# Patient Record
Sex: MALE | Race: BLACK OR AFRICAN AMERICAN | Employment: UNEMPLOYED | URBAN - METROPOLITAN AREA
[De-identification: names, ages, dates, MRNs, and addresses within clinical notes are randomized per-mention and may not be internally consistent; named-entity substitution may affect disease eponyms.]

---

## 2018-02-02 ENCOUNTER — HOSPITAL ENCOUNTER (OUTPATIENT)
Dept: ONCOLOGY | Age: 78
Discharge: HOME | End: 2018-02-02

## 2018-02-02 ENCOUNTER — OFFICE VISIT (OUTPATIENT)
Dept: ONCOLOGY | Age: 78
End: 2018-02-02

## 2018-02-02 ENCOUNTER — HOSPITAL ENCOUNTER (OUTPATIENT)
Dept: LAB | Age: 78
Discharge: HOME | End: 2018-02-02
Payer: MEDICARE

## 2018-02-02 VITALS
TEMPERATURE: 97.6 F | HEIGHT: 69 IN | WEIGHT: 205 LBS | SYSTOLIC BLOOD PRESSURE: 114 MMHG | HEART RATE: 54 BPM | BODY MASS INDEX: 30.36 KG/M2 | DIASTOLIC BLOOD PRESSURE: 51 MMHG

## 2018-02-02 DIAGNOSIS — D63.1 ANEMIA, CHRONIC RENAL FAILURE, STAGE 3 (MODERATE) (HCC): Primary | ICD-10-CM

## 2018-02-02 DIAGNOSIS — D50.8 IRON DEFICIENCY ANEMIA SECONDARY TO INADEQUATE DIETARY IRON INTAKE: ICD-10-CM

## 2018-02-02 DIAGNOSIS — N18.30 ANEMIA, CHRONIC RENAL FAILURE, STAGE 3 (MODERATE) (HCC): ICD-10-CM

## 2018-02-02 DIAGNOSIS — D63.1 ANEMIA, CHRONIC RENAL FAILURE, STAGE 3 (MODERATE) (HCC): ICD-10-CM

## 2018-02-02 DIAGNOSIS — N18.30 ANEMIA, CHRONIC RENAL FAILURE, STAGE 3 (MODERATE) (HCC): Primary | ICD-10-CM

## 2018-02-02 LAB
ALBUMIN SERPL-MCNC: 3.9 G/DL (ref 3.4–5)
ALBUMIN/GLOB SERPL: 1.3 {RATIO} (ref 0.8–1.7)
ALP SERPL-CCNC: 69 U/L (ref 45–117)
ALT SERPL-CCNC: 26 U/L (ref 16–61)
ANION GAP SERPL CALC-SCNC: 7 MMOL/L (ref 3–18)
AST SERPL-CCNC: 23 U/L (ref 15–37)
BASO+EOS+MONOS # BLD AUTO: 0.4 K/UL (ref 0–2.3)
BASO+EOS+MONOS # BLD AUTO: 12 % (ref 0.1–17)
BILIRUB SERPL-MCNC: 0.4 MG/DL (ref 0.2–1)
BUN SERPL-MCNC: 32 MG/DL (ref 7–18)
BUN/CREAT SERPL: 18 (ref 12–20)
CALCIUM SERPL-MCNC: 9.4 MG/DL (ref 8.5–10.1)
CHLORIDE SERPL-SCNC: 109 MMOL/L (ref 100–108)
CO2 SERPL-SCNC: 27 MMOL/L (ref 21–32)
CREAT SERPL-MCNC: 1.75 MG/DL (ref 0.6–1.3)
DIFFERENTIAL METHOD BLD: ABNORMAL
ERYTHROCYTE [DISTWIDTH] IN BLOOD BY AUTOMATED COUNT: 16.8 % (ref 11.5–14.5)
GLOBULIN SER CALC-MCNC: 3 G/DL (ref 2–4)
GLUCOSE SERPL-MCNC: 123 MG/DL (ref 74–99)
HCT VFR BLD AUTO: 31 % (ref 36–48)
HGB BLD-MCNC: 10.1 G/DL (ref 12–16)
LYMPHOCYTES # BLD: 1.4 K/UL (ref 1.1–5.9)
LYMPHOCYTES NFR BLD: 43 % (ref 14–44)
MCH RBC QN AUTO: 28.8 PG (ref 25–35)
MCHC RBC AUTO-ENTMCNC: 32.6 G/DL (ref 31–37)
MCV RBC AUTO: 88.3 FL (ref 78–102)
NEUTS SEG # BLD: 1.5 K/UL (ref 1.8–9.5)
NEUTS SEG NFR BLD: 45 % (ref 40–70)
PLATELET # BLD AUTO: 148 K/UL (ref 140–440)
POTASSIUM SERPL-SCNC: 4 MMOL/L (ref 3.5–5.5)
PROT SERPL-MCNC: 6.9 G/DL (ref 6.4–8.2)
RBC # BLD AUTO: 3.51 M/UL (ref 4.1–5.1)
SODIUM SERPL-SCNC: 143 MMOL/L (ref 136–145)
WBC # BLD AUTO: 3.3 K/UL (ref 4.5–13)

## 2018-02-02 PROCEDURE — 82728 ASSAY OF FERRITIN: CPT | Performed by: INTERNAL MEDICINE

## 2018-02-02 PROCEDURE — 83540 ASSAY OF IRON: CPT | Performed by: INTERNAL MEDICINE

## 2018-02-02 PROCEDURE — 82668 ASSAY OF ERYTHROPOIETIN: CPT | Performed by: INTERNAL MEDICINE

## 2018-02-02 PROCEDURE — 36415 COLL VENOUS BLD VENIPUNCTURE: CPT | Performed by: INTERNAL MEDICINE

## 2018-02-02 PROCEDURE — 80053 COMPREHEN METABOLIC PANEL: CPT | Performed by: INTERNAL MEDICINE

## 2018-02-02 PROCEDURE — 84165 PROTEIN E-PHORESIS SERUM: CPT | Performed by: INTERNAL MEDICINE

## 2018-02-02 NOTE — MR AVS SNAPSHOT
[address] [address] [address] [place] [address] 
[phone] Patient: Pau Mccullough MRN: [id number] CQE:1/1/7832 Visit Information Date & Time Provider Department Dept. Phone Encounter #  
 2/2/2018  3:30 PM [facility]nel[facility] Office [phone] [id number] Follow-up Instructions Return in about 1 week (around 2/9/2018). Your Appointments 2/12/2018  1:00 PM  
ESTABLISHED PATIENT with Rusty Pineda MD  
[facility]) Appt Note: 1 year F/u Flow bus ua  
 [address] [place] [address] [facility] [address] [address]  
  
    
 3/12/2018  3:45 PM  
Office Visit with Jordan Powers MD  
[address] [facility]  [facility]) Appt Note: 1 wk fu; 1 wk fu  
 [address] [place] [address] [facility] [address] [facility], [address] [place] [address] Upcoming Health Maintenance Date Due DTaP/Tdap/Td series (1 - Tdap) 3/7/1961 ZOSTER VACCINE AGE 60> 1/7/2002 GLAUCOMA SCREENING Q2Y 3/7/2007 MEDICARE YEARLY EXAM 3/7/2007 Influenza Age 5 to Adult 8/1/2017 Pneumococcal 65+ Low/Medium Risk (2 of 2 - PPSV23) 10/4/2018 Allergies as of 2/2/2018  Review Complete On: 10/4/2017 By: Emma William LPN No Known Allergies Current Immunizations  Never Reviewed No immunizations on file. Not reviewed this visit You Were Diagnosed With   
  
 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  
 30.27 kg/m 2 2.13 m 2 Preferred Pharmacy Pharmacy Name Phone [address] [facility], [address] Your Updated Medication List  
  
   
This list is accurate as of: 2/2/18  3:30 PM.  Always use your most recent med list.  
  
  
  
  
 atorvastatin 40 mg tablet Commonly known as:  LIPITOR  
take 1 tablet by mouth at bedtime for cholesterol  
  
 carvedilol 12.5 mg tablet Commonly known as:  Opal Pates Take  by mouth two (2) times daily (with meals). dilTIAZem  mg ER capsule Commonly known as:  CARDIZEM CD  
  
 ELIQUIS 5 mg tablet Generic drug:  apixaban  
  
 fenofibrate micronized 67 mg capsule Commonly known as:  LOFIBRA  
take 1 capsule by mouth once daily  
  
 furosemide 20 mg tablet Commonly known as:  LASIX  
  
 levETIRAcetam 750 mg tablet Commonly known as:  KEPPRA  
take 1 tablet by mouth twice a day  
  
 losartan-hydroCHLOROthiazide 100-25 mg per tablet Commonly known as:  HYZAAR Take 1 Tab by mouth daily. metFORMIN 500 mg tablet Commonly known as:  GLUCOPHAGE Take  by mouth two (2) times daily (with meals). omeprazole 10 mg capsule Commonly known as:  PRILOSEC Take 10 mg by mouth daily. tamsulosin 0.4 mg capsule Commonly known as:  FLOMAX  
take 1 capsule by mouth every evening  
  
 timolol 0.5 % ophthalmic solution Commonly known as:  TIMOPTIC  
1 Drop two (2) times a day. Follow-up Instructions Return in about 1 week (around 2/9/2018). To-Do List   
 02/02/2018 Lab:  ERYTHROPOIETIN   
  
 02/02/2018 Lab:  FERRITIN   
  
 02/02/2018 Lab:  IRON PROFILE   
  
 02/02/2018 Lab:  METABOLIC PANEL, COMPREHENSIVE   
  
 02/02/2018 Lab: PROTEIN ELECTROPHORESIS Patient Instructions Anemia From Chronic Disease: Care Instructions Your Care Instructions Anemia is a low level of red blood cells. Red blood cells carry oxygen from your lungs to the rest of your body. Sometimes when you have a long-term (chronic) disease, such as kidney disease, arthritis, diabetes, cancer, or an infection, your body does not make enough red blood cells. Follow-up care is a key part of your treatment and safety. Be sure to make and go to all appointments, and call your doctor if you are having problems. It's also a good idea to know your test results and keep a list of the medicines you take. How can you care for yourself at home? · Follow your doctor's instructions to treat the chronic condition that is causing the anemia. · Be safe with medicines. Take your medicine to treat your chronic condition exactly as prescribed. Call your doctor if you think you are having a problem with your medicine. · Take your medicine for anemia exactly as prescribed. Call your doctor if you think you are having a problem with your medicine. Medicines to increase the number of red blood cells (such as epoetin or darbepoetin) may be given as an injection. ¨ If you miss a dose, take it as soon as you can, unless it is almost time for your next dose. In that case, get back on your regular schedule and take only one dose. ¨ Do not freeze this medicine. Store it in the refrigerator. Do not shake the bottle before you prepare the shot. · Keep all your appointments for blood tests to check on your hemoglobin levels. When should you call for help? Call 911 anytime you think you may need emergency care. For example, call if: 
? · You passed out (lost consciousness). ?Call your doctor now or seek immediate medical care if: 
? · You are short of breath. ? · You are dizzy or light-headed, or you feel like you may faint. ? · You have new or worse bleeding. ?Watch closely for changes in your health, and be sure to contact your doctor if: 
? · You feel weaker or more tired than usual.  
? · You do not get better as expected. Where can you learn more? Go to http://nasrin-deejay.info/. Enter E502 in the search box to learn more about \"Anemia From Chronic Disease: Care Instructions. \" Current as of: October 13, 2016 Content Version: 11.4 © 7416-0913 [employer]. Care instructions adapted under license by [employer] (which disclaims liability or warranty for this information). If you have questions about a medical condition or this instruction, always ask your healthcare professional. [address] any warranty or liability for your use of this information. Introducing [facility]! Minal May introduces [employer] patient portal. Now you can access parts of your medical record, email your doctor's office, and request medication refills online. 1. In your internet browser, go to [email or web address] 2. Click on the First Time User? Click Here link in the Sign In box. You will see the New Member Sign Up page. 3. Enter your [employer] Access Code exactly as it appears below. You will not need to use this code after youve completed the sign-up process. If you do not sign up before the expiration date, you must request a new code. · [employer] Access Code: [email or web address] Expires: 5/3/2018  3:30 PM 
 
4. Enter the last four digits of your Social Security Number (xxxx) and Date of Birth (mm/dd/yyyy) as indicated and click Submit. You will be taken to the next sign-up page. 5. Create a [employer] ID. This will be your [employer] login ID and cannot be changed, so think of one that is secure and easy to remember. 6. Create a [employer] password. You can change your password at any time. 7. Enter your Password Reset Question and Answer.  This can be used at a later time if you forget your password. 8. Enter your e-mail address. You will receive e-mail notification when new information is available in [address] 9. Click Sign Up. You can now view and download portions of your medical record. 10. Click the Download Summary menu link to download a portable copy of your medical information. If you have questions, please visit the Frequently Asked Questions section of the [employer] website. Remember, [employer] is NOT to be used for urgent needs. For medical emergencies, dial 911. Now available from your iPhone and Android! Please provide this summary of care documentation to your next provider. Your primary care clinician is listed as Maribel Carrillo. If you have any questions after today's visit, please call [phone].

## 2018-02-02 NOTE — PROGRESS NOTES
Hematology/Oncology Consultation Note    Name: Sigurd Romberg  Date: 2018  : 1940    PCP: Gregory Baez MD       Mr. Renee Green  is a 78 y.o. -American man who was referred for evaluation of slowly progressive anemia. Subjective:   Chief complaint: Low blood count    History of present illness:  Mr. Renee Green is a 51-year-old -American man who states that over the past 6 months he has noticed a slowly progressive decline in his energy level. He has also noticed that he is doing better when he moderate his activity level to avoid feeling tired. In 2017 he was told that he was anemic with a hemoglobin of about 11.5 g/dL. He has been taking over-the-counter iron supplementation since initially being told that he is anemic. However a follow-up CBC done on 2018 showed that his hemoglobin had declined further to 10.8 g/dL with hematocrit of 32.5. He denies having any gastrointestinal blood loss or genitourinary blood loss. He is therefore here today for complete assessment to have define an etiology for his slowly progressive anemia and to discuss additional options of management beyond oral iron therapy. The patient is also seeking clearance so he can proceed with having a right hip total replacement surgery. His clearance was held up by his primary care physician until he is seen in hematology regarding the anemia. He is using a cane for mobility support. Past Medical History:   Diagnosis Date    Hypercholesterolemia     Hypertension     Stroke (cerebrum) ([facility] [address]        No Known Allergies    History reviewed. No pertinent surgical history. Social History     Social History    Marital status:      Spouse name: N/A    Number of children: N/A    Years of education: N/A     Occupational History    Not on file.      Social History Main Topics    Smoking status: Never Smoker    Smokeless tobacco: Never Used    Alcohol use No    Drug use: Not on file    Sexual activity: Not on file     Other Topics Concern    Not on file     Social History Narrative       Family History   Problem Relation Age of Onset    Diabetes Mother     Hypertension Mother     Heart Disease Mother     Cancer Father     Heart Disease Brother     Diabetes Brother     Heart Disease Brother        Current Outpatient Prescriptions   Medication Sig Dispense Refill    tamsulosin (FLOMAX) 0.4 mg capsule take 1 capsule by mouth every evening 90 Cap 3    metFORMIN (GLUCOPHAGE) 500 mg tablet Take  by mouth two (2) times daily (with meals).  timolol (TIMOPTIC) 0.5 % ophthalmic solution 1 Drop two (2) times a day.  ELIQUIS 5 mg tablet   0    atorvastatin (LIPITOR) 40 mg tablet take 1 tablet by mouth at bedtime for cholesterol  0    dilTIAZem CD (CARDIZEM CD) 120 mg ER capsule   0    fenofibrate micronized (LOFIBRA) 67 mg capsule take 1 capsule by mouth once daily  0    furosemide (LASIX) 20 mg tablet   0    levETIRAcetam (KEPPRA) 750 mg tablet take 1 tablet by mouth twice a day  0    carvedilol (COREG) 12.5 mg tablet Take  by mouth two (2) times daily (with meals).  losartan-hydrochlorothiazide (HYZAAR) 100-25 mg per tablet Take 1 Tab by mouth daily.  omeprazole (PRILOSEC) 10 mg capsule Take 10 mg by mouth daily. Review of Systems    General ROS:The patient has complaints of some fatigue and weakness. Psychological ROS: patient denies having any psychological symptoms such as hallucinations, depression or anxiety. Ophthalmic ROS:the patient denies having any visual impairment or eye discomfort. ENT ROS: there are no abnormalities reported. Allergy and Immunology ROS:the patient denies having any seasonal allergies or allergies to medications other than those already outlined above. Hematological and Lymphatic ROS: the patient denies having any bruising, bleeding or lymphadenopathy. Endocrine ROS: the patient denies having any heat or cold intolerance.   There is no history of diabetes or thyroid disorders. Breast ROS: the patient denies having any history of breast mass, nipple discharge, or lumps. Respiratory ROS:the patient denies having any cough, shortness of breath, or dyspnea on exertion. Cardiovascular ROS: there are no complaints of chest pain, palpitations, chest pounding, or dyspnea on exertion. Gastrointestinal ROS: the patient denies having nausea, emesis, diarrhea, constipation, or blood in the stool. Genito-Urinary ROS: the patient denies having urinary urgency, frequency, or dysuria. Musculoskeletal ROS: The patient complains of right hip pain and is hoping to ultimately get clearance so he can have total hip replacement surgery in the upcoming weeks. Neurological ROS: the patient denies having any numbness, tingling, or neurologic deficits. Dermatological ROS:patient denies having any unexplained rash, skin ulcerations, or hives. Objective:     Visit Vitals    /51    Pulse (!) 54    Temp 97.6 °F (36.4 °C) (Oral)    Ht 5' 9\" (1.753 m)    Wt 93 kg (205 lb)    BMI 30.27 kg/m2        Physical Exam:   Gen. Appearance: the patient is in no acute distress. Skin: There is no evidence of bruise or rash. HEENT: The head is normocephalic and atraumatic. The conjunctiva and sclera are clear. Pupils are equal, round, reactive to light, and accommodation. The extraocular movements are intact. ENT reveals no oral mucosal lesions or ulcerations. Neck: Supple without lymphadenopathy or thyromegaly. Lungs: Clear to auscultation and percussion; there are no wheezes or rhonchi. Heart: Regular rate and rhythm; there are no murmurs, gallops, or rubs. Abdomen: Bowel sounds are present and normal.  There is no guarding, tenderness, or hepatosplenomegaly. Extremities: There is no clubbing, cyanosis, or edema. Neurologic: There are no focal neurologic deficits. Lymphatics: There is no palpable peripheral lymphadenopathy. Lab data:   The most recent CBC dated 1/25/2018 shows a WBC count of 3.5, hemoglobin is 10.4 g/dL, hematocrit is 32.2%, MCV is 87, and the platelet count is 984,486. A urinalysis from 1/25/2018 was negative. Assessment:   Chronic anemia, etiology not clearly defined: I have explained to the patient that his anemia is most likely multifactorial.  The diagnostic considerations include anemia with chronic kidney disease, myelodysplastic syndrome, iron deficiency, and plasma cell dyscrasia. Plan:   Chronic anemia, etiology not clearly define: At this time I will order a comprehensive metabolic panel, iron profile, ferritin level, erythropoietin level, and SPEP. I will plan to see the patient back in clinic in 2 weeks to review lab data and to discuss potential options of management. Pending the results of these tests we will be able to determine whether or not we can clear the patient for his elective surgical procedure as well. Follow-up in 2 weeks. Orders Placed This Encounter    COMPLETE CBC & AUTO DIFF WBC    METABOLIC PANEL, COMPREHENSIVE     Standing Status:   Future     Number of Occurrences:   1     Standing Expiration Date:   2/3/2019    IRON PROFILE     Standing Status:   Future     Number of Occurrences:   1     Standing Expiration Date:   2/3/2019    FERRITIN     Standing Status:   Future     Number of Occurrences:   1     Standing Expiration Date:   2/3/2019    SPEP     Standing Status:   Future     Number of Occurrences:   1     Standing Expiration Date:   2/3/2019    ERYTHROPOIETIN     Standing Status:   Future     Number of Occurrences:   1     Standing Expiration Date:   2/3/2019    InHouse CBC ([employer])     Standing Status:   Future     Number of Occurrences:   1     Standing Expiration Date:   2/9/2018           Donte Lowery MD  2/6/2018      Please note: This document has been produced using voice recognition software. Unrecognized errors in transcription may be present.

## 2018-02-02 NOTE — PATIENT INSTRUCTIONS
Anemia From Chronic Disease: Care Instructions  Your Care Instructions    Anemia is a low level of red blood cells. Red blood cells carry oxygen from your lungs to the rest of your body. Sometimes when you have a long-term (chronic) disease, such as kidney disease, arthritis, diabetes, cancer, or an infection, your body does not make enough red blood cells. Follow-up care is a key part of your treatment and safety. Be sure to make and go to all appointments, and call your doctor if you are having problems. It's also a good idea to know your test results and keep a list of the medicines you take. How can you care for yourself at home? · Follow your doctor's instructions to treat the chronic condition that is causing the anemia. · Be safe with medicines. Take your medicine to treat your chronic condition exactly as prescribed. Call your doctor if you think you are having a problem with your medicine. · Take your medicine for anemia exactly as prescribed. Call your doctor if you think you are having a problem with your medicine. Medicines to increase the number of red blood cells (such as epoetin or darbepoetin) may be given as an injection. ¨ If you miss a dose, take it as soon as you can, unless it is almost time for your next dose. In that case, get back on your regular schedule and take only one dose. ¨ Do not freeze this medicine. Store it in the refrigerator. Do not shake the bottle before you prepare the shot. · Keep all your appointments for blood tests to check on your hemoglobin levels. When should you call for help? Call 911 anytime you think you may need emergency care. For example, call if:  ? · You passed out (lost consciousness). ?Call your doctor now or seek immediate medical care if:  ? · You are short of breath. ? · You are dizzy or light-headed, or you feel like you may faint. ? · You have new or worse bleeding. ? Watch closely for changes in your health, and be sure to contact your doctor if:  ? · You feel weaker or more tired than usual.   ? · You do not get better as expected. Where can you learn more? Go to http://nasrin-deejay.info/. Enter E502 in the search box to learn more about \"Anemia From Chronic Disease: Care Instructions. \"  Current as of: October 13, 2016  Content Version: 11.4  © 7439-6900 [email or web address]. Care instructions adapted under license by [employer] (which disclaims liability or warranty for this information). If you have questions about a medical condition or this instruction, always ask your healthcare professional. [place] [address] any warranty or liability for your use of this information.

## 2018-02-03 LAB
EPO SERPL-ACNC: 16.8 MIU/ML (ref 2.6–18.5)
FERRITIN SERPL-MCNC: 160 NG/ML (ref 8–388)
IRON SATN MFR SERPL: 20 %
IRON SERPL-MCNC: 65 UG/DL (ref 50–175)
TIBC SERPL-MCNC: 322 UG/DL (ref 250–450)

## 2018-02-05 LAB
ALBUMIN SERPL ELPH-MCNC: 3.6 G/DL (ref 2.9–4.4)
ALBUMIN/GLOB SERPL: 1.2 {RATIO} (ref 0.7–1.7)
ALPHA1 GLOB SERPL ELPH-MCNC: 0.2 G/DL (ref 0–0.4)
ALPHA2 GLOB SERPL ELPH-MCNC: 0.7 G/DL (ref 0.4–1)
B-GLOBULIN SERPL ELPH-MCNC: 1 G/DL (ref 0.7–1.3)
GAMMA GLOB SERPL ELPH-MCNC: 1 G/DL (ref 0.4–1.8)
GLOBULIN SER CALC-MCNC: 2.9 G/DL (ref 2.2–3.9)
M PROTEIN SERPL ELPH-MCNC: NORMAL G/DL
PROT SERPL-MCNC: 6.5 G/DL (ref 6–8.5)

## 2018-02-14 ENCOUNTER — OFFICE VISIT (OUTPATIENT)
Dept: ONCOLOGY | Age: 78
End: 2018-02-14

## 2018-02-14 VITALS
SYSTOLIC BLOOD PRESSURE: 137 MMHG | DIASTOLIC BLOOD PRESSURE: 49 MMHG | HEART RATE: 61 BPM | BODY MASS INDEX: 31.31 KG/M2 | WEIGHT: 212 LBS | TEMPERATURE: 97 F

## 2018-02-14 DIAGNOSIS — D63.1 ANEMIA, CHRONIC RENAL FAILURE, STAGE 3 (MODERATE) (HCC): Primary | ICD-10-CM

## 2018-02-14 DIAGNOSIS — N18.30 ANEMIA, CHRONIC RENAL FAILURE, STAGE 3 (MODERATE) (HCC): Primary | ICD-10-CM

## 2018-02-14 DIAGNOSIS — D50.8 IRON DEFICIENCY ANEMIA SECONDARY TO INADEQUATE DIETARY IRON INTAKE: ICD-10-CM

## 2018-02-14 NOTE — PATIENT INSTRUCTIONS
Anemia From Chronic Disease: Care Instructions  Your Care Instructions    Anemia is a low level of red blood cells. Red blood cells carry oxygen from your lungs to the rest of your body. Sometimes when you have a long-term (chronic) disease, such as kidney disease, arthritis, diabetes, cancer, or an infection, your body does not make enough red blood cells. Follow-up care is a key part of your treatment and safety. Be sure to make and go to all appointments, and call your doctor if you are having problems. It's also a good idea to know your test results and keep a list of the medicines you take. How can you care for yourself at home? · Follow your doctor's instructions to treat the chronic condition that is causing the anemia. · Be safe with medicines. Take your medicine to treat your chronic condition exactly as prescribed. Call your doctor if you think you are having a problem with your medicine. · Take your medicine for anemia exactly as prescribed. Call your doctor if you think you are having a problem with your medicine. Medicines to increase the number of red blood cells (such as epoetin or darbepoetin) may be given as an injection. ¨ If you miss a dose, take it as soon as you can, unless it is almost time for your next dose. In that case, get back on your regular schedule and take only one dose. ¨ Do not freeze this medicine. Store it in the refrigerator. Do not shake the bottle before you prepare the shot. · Keep all your appointments for blood tests to check on your hemoglobin levels. When should you call for help? Call 911 anytime you think you may need emergency care. For example, call if:  ? · You passed out (lost consciousness). ?Call your doctor now or seek immediate medical care if:  ? · You are short of breath. ? · You are dizzy or light-headed, or you feel like you may faint. ? · You have new or worse bleeding. ? Watch closely for changes in your health, and be sure to contact your doctor if:  ? · You feel weaker or more tired than usual.   ? · You do not get better as expected. Where can you learn more? Go to http://nasrin-deejay.info/. Enter E502 in the search box to learn more about \"Anemia From Chronic Disease: Care Instructions. \"  Current as of: October 13, 2016  Content Version: 11.4  © 3502-5274 [employer]. Care instructions adapted under license by [employer] (which disclaims liability or warranty for this information). If you have questions about a medical condition or this instruction, always ask your healthcare professional. [place] [address] any warranty or liability for your use of this information.

## 2018-02-14 NOTE — MR AVS SNAPSHOT
[facility] 
 
 
 [facility] 207, [place] [address] 200 [facility] 
[phone] Patient: Verónica Cline MRN: [id number] TWK:[id number] Visit Information Date & Time Provider Department Dept. Phone Encounter #  
 2/14/2018  9:15 AM Bakair Glass MD [facility] Medical [facility] [phone] [id number] Follow-up Instructions Return in about 2 months (around 4/14/2018). Your Appointments 3/12/2018  9:00 AM  
Nurse Visit with SANDRINE SILVER [facility] Medical On[facility] ([facility]) Appt Note: CBC  
 [address] [place], [place] [address] [place] [address] [facility] 3200 [facility], [address] [place] [address] 200 [facility] 4/18/2018  9:15 AM  
Office Visit with Bakari Glass MD  
[address] [address] Oncology [facility]) Appt Note: 2 months [facility] 207, [place] [address]61 [place] [address] 250 [facility] 207, [address]7 [facility] [address] [address] [address]  
  
    
 1/29/2019  9:30 AM  
Nurse Visit with Juan C Nix [facility] ([facility]) Appt Note: PSA  
 [address] [place] [address] [facility] 300 [facility]  
  
    
  
 2/12/2019 10:00 AM  
Any with Ingrid Askew MD  
[facility] ([facility]) Appt Note: 1 FLOW PVR UA PSA PRIORS  
 [address] [place] [address]  
  
   
 709 [facility] [address] [address] [address] Upcoming Health Maintenance Date Due DTaP/Tdap/Td series (1 - Tdap) 3/7/1963 ZOSTER VACCINE AGE 60> 1/7/2002 GLAUCOMA SCREENING Q2Y 3/7/2007 Pneumococcal 65+ High/Highest Risk (1 of 2 - PCV13) 3/7/2007 MEDICARE YEARLY EXAM 3/7/2007 Influenza Age 5 to Adult 8/1/2017 Allergies as of 2/14/2018  Review Complete On: 2/14/2018 By: Dandy Garcia MD  
 No Known Allergies Current Immunizations  Never Reviewed No immunizations on file. Not reviewed this visit You Were Diagnosed With   
  
 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  
 31.31 kg/m 2 2.16 m 2 Preferred Pharmacy Pharmacy Name Phone [id number] [facility], [address] [address] Your Updated Medication List  
  
   
This list is accurate as of: 2/14/18  9:55 AM.  Always use your most recent med list.  
  
  
  
  
 atorvastatin 40 mg tablet Commonly known as:  LIPITOR  
take 1 tablet by mouth at bedtime for cholesterol  
  
 bumetanide 2 mg tablet Commonly known as:  Tyson Puckett CARTIA  mg ER capsule Generic drug:  dilTIAZem CD  
  
 carvedilol 12.5 mg tablet Commonly known as:  Rk Kirk Take  by mouth two (2) times daily (with meals). ELIQUIS 5 mg tablet Generic drug:  apixaban  
  
 fenofibrate micronized 67 mg capsule Commonly known as:  LOFIBRA  
take 1 capsule by mouth once daily  
  
 furosemide 20 mg tablet Commonly known as:  LASIX  
  
 levETIRAcetam 750 mg tablet Commonly known as:  KEPPRA  
take 1 tablet by mouth twice a day  
  
 losartan-hydroCHLOROthiazide 100-25 mg per tablet Commonly known as:  HYZAAR Take 1 Tab by mouth daily. metFORMIN 500 mg tablet Commonly known as:  GLUCOPHAGE Take  by mouth two (2) times daily (with meals). omeprazole 10 mg capsule Commonly known as:  PRILOSEC  
 Take 10 mg by mouth daily. tamsulosin 0.4 mg capsule Commonly known as:  FLOMAX  
take 1 capsule by mouth every evening  
  
 timolol 0.5 % ophthalmic solution Commonly known as:  TIMOPTIC  
1 Drop two (2) times a day. Follow-up Instructions Return in about 2 months (around 4/14/2018). Patient Instructions Anemia From Chronic Disease: Care Instructions Your Care Instructions Anemia is a low level of red blood cells. Red blood cells carry oxygen from your lungs to the rest of your body. Sometimes when you have a long-term (chronic) disease, such as kidney disease, arthritis, diabetes, cancer, or an infection, your body does not make enough red blood cells. Follow-up care is a key part of your treatment and safety. Be sure to make and go to all appointments, and call your doctor if you are having problems. It's also a good idea to know your test results and keep a list of the medicines you take. How can you care for yourself at home? · Follow your doctor's instructions to treat the chronic condition that is causing the anemia. · Be safe with medicines. Take your medicine to treat your chronic condition exactly as prescribed. Call your doctor if you think you are having a problem with your medicine. · Take your medicine for anemia exactly as prescribed. Call your doctor if you think you are having a problem with your medicine. Medicines to increase the number of red blood cells (such as epoetin or darbepoetin) may be given as an injection. ¨ If you miss a dose, take it as soon as you can, unless it is almost time for your next dose. In that case, get back on your regular schedule and take only one dose. ¨ Do not freeze this medicine. Store it in the refrigerator. Do not shake the bottle before you prepare the shot. · Keep all your appointments for blood tests to check on your hemoglobin levels. When should you call for help? Call 911 anytime you think you may need emergency care. For example, call if: 
? · You passed out (lost consciousness). ?Call your doctor now or seek immediate medical care if: 
? · You are short of breath. ? · You are dizzy or light-headed, or you feel like you may faint. ? · You have new or worse bleeding. ? Watch closely for changes in your health, and be sure to contact your doctor if: 
? · You feel weaker or more tired than usual.  
? · You do not get better as expected. Where can you learn more? Go to http://nasrin-deejay.info/. Enter E502 in the search box to learn more about \"Anemia From Chronic Disease: Care Instructions. \" Current as of: October 13, 2016 Content Version: 11.4 © 6727-3462 [employer]. Care instructions adapted under license by [employer] (which disclaims liability or warranty for this information). If you have questions about a medical condition or this instruction, always ask your healthcare professional. [address] any warranty or liability for your use of this information. Introducing [address]! Jan Chapman introduces We[employer] patient portal. Now you can access parts of your medical record, email your doctor's office, and request medication refills online. 1. In your internet browser, go to [email or web address] 2. Click on the First Time User? Click Here link in the Sign In box. You will see the New Member Sign Up page. 3. Enter your WeWork Access Code exactly as it appears below. You will not need to use this code after youve completed the sign-up process. If you do not sign up before the expiration date, you must request a new code. · [employer] Access Code: [email or web address] Expires: 5/3/2018  3:30 PM 
 
4. Enter the last four digits of your Social Security Number (xxxx) and Date of Birth (mm/dd/yyyy) as indicated and click Submit.  You will be taken to the next sign-up page. 5. Create a DNA Direct ID. This will be your DNA Direct login ID and cannot be changed, so think of one that is secure and easy to remember. 6. Create a DNA Direct password. You can change your password at any time. 7. Enter your Password Reset Question and Answer. This can be used at a later time if you forget your password. 8. Enter your e-mail address. You will receive e-mail notification when new information is available in [address] 9. Click Sign Up. You can now view and download portions of your medical record. 10. Click the Download Summary menu link to download a portable copy of your medical information. If you have questions, please visit the Frequently Asked Questions section of the DNA Direct website. Remember, DNA Direct is NOT to be used for urgent needs. For medical emergencies, dial 911. Now available from your iPhone and Android! Please provide this summary of care documentation to your next provider. Your primary care clinician is listed as Red Webster. If you have any questions after today's visit, please call [phone].

## 2018-02-16 NOTE — PROGRESS NOTES
Hematology/medical oncology progress note    2/14/2018  Verónica Cline  YOB: 1940    PCP: Richa Mejia MD    Diagnosis anemia associated with chronic kidney disease    Mr. Rere Guerrero is a 79-year-old man who is referred for evaluation of anemia. Have explained to the patient that his CBC on 2/2/2018 revealed a WBC count of 3.3, hemoglobin 10.1 g/dL, hematocrit 31%, and his platelet count was 197,070. His comprehensive metabolic panel revealed that he had a creatinine of 1.75 mg/dL with a BUN of 32. Iron studies revealed that his iron level was 65 mcg/dL with an iron saturation of 20%. His erythropoietin level was normal at 16.8 and his ferritin level was 160 ng/mL. An SPEP revealed no evidence of a monoclonal paraprotein. I have explained to the patient that these findings support the diagnosis of anemia associated with chronic kidney disease. Therapeutic intervention is recommended if the hemoglobin declines below 10 g/dL with a hematocrit below 30%. If he meets these criteria in the future he will be offered Procrit at a dose of 60,000 units every 2 weeks. I will have him return to the lab on a monthly basis to check his hemoglobin and hematocrit and I will reassess him at 2 month intervals in the future. The patient had his questions answered to his satisfaction. Total time 30 minutes, greater than 50% of the time was in counseling and coordination of care. Dom Diaz MD, [address]

## 2018-03-06 ENCOUNTER — TELEPHONE (OUTPATIENT)
Dept: ONCOLOGY | Age: 78
End: 2018-03-06

## 2018-03-12 ENCOUNTER — CLINICAL SUPPORT (OUTPATIENT)
Dept: ONCOLOGY | Age: 78
End: 2018-03-12

## 2018-03-12 ENCOUNTER — HOSPITAL ENCOUNTER (OUTPATIENT)
Dept: ONCOLOGY | Age: 78
Discharge: HOME | End: 2018-03-12

## 2018-03-12 DIAGNOSIS — N18.30 ANEMIA, CHRONIC RENAL FAILURE, STAGE 3 (MODERATE) (HCC): Primary | ICD-10-CM

## 2018-03-12 DIAGNOSIS — D63.1 ANEMIA, CHRONIC RENAL FAILURE, STAGE 3 (MODERATE) (HCC): ICD-10-CM

## 2018-03-12 DIAGNOSIS — N18.30 ANEMIA, CHRONIC RENAL FAILURE, STAGE 3 (MODERATE) (HCC): ICD-10-CM

## 2018-03-12 DIAGNOSIS — D63.1 ANEMIA, CHRONIC RENAL FAILURE, STAGE 3 (MODERATE) (HCC): Primary | ICD-10-CM

## 2018-03-12 LAB
BASO+EOS+MONOS # BLD AUTO: 0.6 K/UL (ref 0–2.3)
BASO+EOS+MONOS # BLD AUTO: 17 % (ref 0.1–17)
DIFFERENTIAL METHOD BLD: ABNORMAL
ERYTHROCYTE [DISTWIDTH] IN BLOOD BY AUTOMATED COUNT: 17.4 % (ref 11.5–14.5)
HCT VFR BLD AUTO: 31 % (ref 36–48)
HGB BLD-MCNC: 10.2 G/DL (ref 12–16)
LYMPHOCYTES # BLD: 1.2 K/UL (ref 1.1–5.9)
LYMPHOCYTES NFR BLD: 36 % (ref 14–44)
MCH RBC QN AUTO: 30.5 PG (ref 25–35)
MCHC RBC AUTO-ENTMCNC: 32.9 G/DL (ref 31–37)
MCV RBC AUTO: 92.8 FL (ref 78–102)
NEUTS SEG # BLD: 1.5 K/UL (ref 1.8–9.5)
NEUTS SEG NFR BLD: 47 % (ref 40–70)
PLATELET # BLD AUTO: 184 K/UL (ref 140–440)
RBC # BLD AUTO: 3.34 M/UL (ref 4.1–5.1)
WBC # BLD AUTO: 3.3 K/UL (ref 4.5–13)

## 2018-04-18 ENCOUNTER — HOSPITAL ENCOUNTER (OUTPATIENT)
Dept: ONCOLOGY | Age: 78
Discharge: HOME | End: 2018-04-18

## 2018-04-18 ENCOUNTER — OFFICE VISIT (OUTPATIENT)
Dept: ONCOLOGY | Age: 78
End: 2018-04-18

## 2018-04-18 ENCOUNTER — HOSPITAL ENCOUNTER (OUTPATIENT)
Dept: LAB | Age: 78
Discharge: HOME | End: 2018-04-18
Payer: MEDICARE

## 2018-04-18 VITALS — TEMPERATURE: 97.4 F | HEART RATE: 66 BPM | SYSTOLIC BLOOD PRESSURE: 170 MMHG | DIASTOLIC BLOOD PRESSURE: 76 MMHG

## 2018-04-18 DIAGNOSIS — N18.30 ANEMIA, CHRONIC RENAL FAILURE, STAGE 3 (MODERATE) (HCC): ICD-10-CM

## 2018-04-18 DIAGNOSIS — D63.1 ANEMIA, CHRONIC RENAL FAILURE, STAGE 3 (MODERATE) (HCC): ICD-10-CM

## 2018-04-18 DIAGNOSIS — D50.8 IRON DEFICIENCY ANEMIA SECONDARY TO INADEQUATE DIETARY IRON INTAKE: ICD-10-CM

## 2018-04-18 DIAGNOSIS — N18.30 ANEMIA, CHRONIC RENAL FAILURE, STAGE 3 (MODERATE) (HCC): Primary | ICD-10-CM

## 2018-04-18 DIAGNOSIS — D63.1 ANEMIA, CHRONIC RENAL FAILURE, STAGE 3 (MODERATE) (HCC): Primary | ICD-10-CM

## 2018-04-18 LAB
ALBUMIN SERPL-MCNC: 3.8 G/DL (ref 3.4–5)
ALBUMIN/GLOB SERPL: 1.2 {RATIO} (ref 0.8–1.7)
ALP SERPL-CCNC: 103 U/L (ref 45–117)
ALT SERPL-CCNC: 17 U/L (ref 16–61)
ANION GAP SERPL CALC-SCNC: 11 MMOL/L (ref 3–18)
AST SERPL-CCNC: 17 U/L (ref 15–37)
BASO+EOS+MONOS # BLD AUTO: 0.6 K/UL (ref 0–2.3)
BASO+EOS+MONOS # BLD AUTO: 15 % (ref 0.1–17)
BILIRUB SERPL-MCNC: 0.5 MG/DL (ref 0.2–1)
BUN SERPL-MCNC: 20 MG/DL (ref 7–18)
BUN/CREAT SERPL: 13 (ref 12–20)
CALCIUM SERPL-MCNC: 9 MG/DL (ref 8.5–10.1)
CHLORIDE SERPL-SCNC: 107 MMOL/L (ref 100–108)
CO2 SERPL-SCNC: 24 MMOL/L (ref 21–32)
CREAT SERPL-MCNC: 1.51 MG/DL (ref 0.6–1.3)
DIFFERENTIAL METHOD BLD: ABNORMAL
ERYTHROCYTE [DISTWIDTH] IN BLOOD BY AUTOMATED COUNT: 16.8 % (ref 11.5–14.5)
FERRITIN SERPL-MCNC: 69 NG/ML (ref 8–388)
GLOBULIN SER CALC-MCNC: 3.2 G/DL (ref 2–4)
GLUCOSE SERPL-MCNC: 97 MG/DL (ref 74–99)
HCT VFR BLD AUTO: 29 % (ref 36–48)
HGB BLD-MCNC: 9.2 G/DL (ref 12–16)
IRON SATN MFR SERPL: 11 %
IRON SERPL-MCNC: 30 UG/DL (ref 50–175)
LYMPHOCYTES # BLD: 1 K/UL (ref 1.1–5.9)
LYMPHOCYTES NFR BLD: 25 % (ref 14–44)
MCH RBC QN AUTO: 29.9 PG (ref 25–35)
MCHC RBC AUTO-ENTMCNC: 31.7 G/DL (ref 31–37)
MCV RBC AUTO: 94.2 FL (ref 78–102)
NEUTS SEG # BLD: 2.6 K/UL (ref 1.8–9.5)
NEUTS SEG NFR BLD: 61 % (ref 40–70)
PLATELET # BLD AUTO: 149 K/UL (ref 140–440)
POTASSIUM SERPL-SCNC: 4.1 MMOL/L (ref 3.5–5.5)
PROT SERPL-MCNC: 7 G/DL (ref 6.4–8.2)
RBC # BLD AUTO: 3.08 M/UL (ref 4.1–5.1)
SODIUM SERPL-SCNC: 142 MMOL/L (ref 136–145)
TIBC SERPL-MCNC: 282 UG/DL (ref 250–450)
WBC # BLD AUTO: 4.2 K/UL (ref 4.5–13)

## 2018-04-18 PROCEDURE — 36415 COLL VENOUS BLD VENIPUNCTURE: CPT | Performed by: INTERNAL MEDICINE

## 2018-04-18 PROCEDURE — 80053 COMPREHEN METABOLIC PANEL: CPT | Performed by: INTERNAL MEDICINE

## 2018-04-18 PROCEDURE — 83540 ASSAY OF IRON: CPT | Performed by: INTERNAL MEDICINE

## 2018-04-18 PROCEDURE — 82728 ASSAY OF FERRITIN: CPT | Performed by: INTERNAL MEDICINE

## 2018-04-18 NOTE — PROGRESS NOTES
Hematology/Oncology Consultation Note    Name: Jude Gonzalez  Date: 2018  : 1940    PCP: Terrie Ott MD       Mr. Gena Madden  is a 70 y.o. -American man who was referred for evaluation of slowly progressive anemia. Subjective:   Chief complaint: Low blood count    History of present illness:  Mr. Gena Madden is a 82-year-old -American man who states that over the past 6 months he has noticed a slowly progressive decline in his energy level. He has also noticed that he is doing better when he moderate his activity level to avoid feeling tired. In 2017 he was told that he was anemic with a hemoglobin of about 11.5 g/dL. He has been taking over-the-counter iron supplementation since initially being told that he is anemic. However a follow-up CBC done on 2018 showed that his hemoglobin had declined further to 10.8 g/dL with hematocrit of 32.5. He denies having any gastrointestinal blood loss or genitourinary blood loss. He is therefore here today for complete assessment to have define an etiology for his slowly progressive anemia and to discuss additional options of management beyond oral iron therapy. The patient is also seeking clearance so he can proceed with having a right hip total replacement surgery. His clearance was held up by his primary care physician until he is seen in hematology regarding the anemia. He is using a cane for mobility support. Past Medical History:   Diagnosis Date    Hypercholesterolemia     Hypertension     Stroke (cerebrum) ([facility] [address]        No Known Allergies    No past surgical history on file. Social History     Social History    Marital status: SINGLE     Spouse name: N/A    Number of children: N/A    Years of education: N/A     Occupational History    Not on file.      Social History Main Topics    Smoking status: Never Smoker    Smokeless tobacco: Never Used    Alcohol use No    Drug use: Not on file    Sexual activity: Not on file     Other Topics Concern    Not on file     Social History Narrative       Family History   Problem Relation Age of Onset    Diabetes Mother     Hypertension Mother     Heart Disease Mother     Cancer Father     Heart Disease Brother     Diabetes Brother     Heart Disease Brother        Current Outpatient Prescriptions   Medication Sig Dispense Refill    bumetanide (BUMEX) 2 mg tablet   0    CARTIA  mg ER capsule   0    tamsulosin (FLOMAX) 0.4 mg capsule take 1 capsule by mouth every evening 90 Cap 3    metFORMIN (GLUCOPHAGE) 500 mg tablet Take  by mouth two (2) times daily (with meals).  timolol (TIMOPTIC) 0.5 % ophthalmic solution 1 Drop two (2) times a day.  ELIQUIS 5 mg tablet   0    atorvastatin (LIPITOR) 40 mg tablet take 1 tablet by mouth at bedtime for cholesterol  0    fenofibrate micronized (LOFIBRA) 67 mg capsule take 1 capsule by mouth once daily  0    furosemide (LASIX) 20 mg tablet   0    levETIRAcetam (KEPPRA) 750 mg tablet take 1 tablet by mouth twice a day  0    carvedilol (COREG) 12.5 mg tablet Take  by mouth two (2) times daily (with meals).  losartan-hydrochlorothiazide (HYZAAR) 100-25 mg per tablet Take 1 Tab by mouth daily.  omeprazole (PRILOSEC) 10 mg capsule Take 10 mg by mouth daily. Review of Systems    General ROS:The patient has complaints of some fatigue and weakness. Psychological ROS: patient denies having any psychological symptoms such as hallucinations, depression or anxiety. Ophthalmic ROS:the patient denies having any visual impairment or eye discomfort. ENT ROS: there are no abnormalities reported. Allergy and Immunology ROS:the patient denies having any seasonal allergies or allergies to medications other than those already outlined above. Hematological and Lymphatic ROS: the patient denies having any bruising, bleeding or lymphadenopathy. Endocrine ROS: the patient denies having any heat or cold intolerance. There is no history of diabetes or thyroid disorders. Breast ROS: the patient denies having any history of breast mass, nipple discharge, or lumps. Respiratory ROS:the patient denies having any cough, shortness of breath, or dyspnea on exertion. Cardiovascular ROS: there are no complaints of chest pain, palpitations, chest pounding, or dyspnea on exertion. Gastrointestinal ROS: the patient denies having nausea, emesis, diarrhea, constipation, or blood in the stool. Genito-Urinary ROS: the patient denies having urinary urgency, frequency, or dysuria. Musculoskeletal ROS: The patient complains of right hip pain and is hoping to ultimately get clearance so he can have total hip replacement surgery in the upcoming weeks. Neurological ROS: the patient denies having any numbness, tingling, or neurologic deficits. Dermatological ROS:patient denies having any unexplained rash, skin ulcerations, or hives. Objective: There were no vitals taken for this visit. Physical Exam:   Gen. Appearance: the patient is in no acute distress. Skin: There is no evidence of bruise or rash. HEENT: The head is normocephalic and atraumatic. The conjunctiva and sclera are clear. Pupils are equal, round, reactive to light, and accommodation. The extraocular movements are intact. ENT reveals no oral mucosal lesions or ulcerations. Neck: Supple without lymphadenopathy or thyromegaly. Lungs: Clear to auscultation and percussion; there are no wheezes or rhonchi. Heart: Regular rate and rhythm; there are no murmurs, gallops, or rubs. Abdomen: Bowel sounds are present and normal.  There is no guarding, tenderness, or hepatosplenomegaly. Extremities: There is no clubbing, cyanosis, or edema. Neurologic: There are no focal neurologic deficits. Lymphatics: There is no palpable peripheral lymphadenopathy. Lab data:   The most recent CBC dated 1/25/2018 shows a WBC count of 3.5, hemoglobin is 10.4 g/dL, hematocrit is 32.2%, MCV is 87, and the platelet count is 155,769. A urinalysis from 2018 was negative. Assessment:   Chronic anemia, etiology not clearly defined: I have explained to the patient that his anemia is most likely multifactorial.  The diagnostic considerations include anemia with chronic kidney disease, myelodysplastic syndrome, iron deficiency, and plasma cell dyscrasia. Plan:   Chronic anemia, etiology not clearly define: At this time I will order a comprehensive metabolic panel, iron profile, ferritin level, erythropoietin level, and SPEP. I will plan to see the patient back in clinic in 2 weeks to review lab data and to discuss potential options of management. Pending the results of these tests we will be able to determine whether or not we can clear the patient for his elective surgical procedure as well. Follow-up in 2 weeks. Orders Placed This Encounter    COMPLETE CBC & AUTO DIFF WBC    METABOLIC PANEL, COMPREHENSIVE     Standing Status:   Future     Standing Expiration Date:   2019    IRON PROFILE     Standing Status:   Future     Standing Expiration Date:   2019    FERRITIN     Standing Status:   Future     Standing Expiration Date:   2019    InHouse CBC (Estrogen Gene Test)     Standing Status:   Future     Number of Occurrences:   1     Standing Expiration Date:   2018           Sissy Cross MD  2018      Please note: This document has been produced using voice recognition software. Unrecognized errors in transcription may be present. Hematology/Oncology  Progress Note    Name: Tanner Zander  Date: 2018  : 1940    Alexis Jones MD     Mr. Latanya Toro is a 70y.o. year old male who was seen for ***. Subjective:     Patient has no complaint of ***. Rufina Pyaan      Past medical history, family history, and social history: these were reviewed and remains unchanged. Past Medical History:   Diagnosis Date    Hypercholesterolemia     Hypertension     Stroke (cerebrum) ([address]      No past surgical history on file. Social History     Social History    Marital status: SINGLE     Spouse name: N/A    Number of children: N/A    Years of education: N/A     Occupational History    Not on file. Social History Main Topics    Smoking status: Never Smoker    Smokeless tobacco: Never Used    Alcohol use No    Drug use: Not on file    Sexual activity: Not on file     Other Topics Concern    Not on file     Social History Narrative     Family History   Problem Relation Age of Onset    Diabetes Mother     Hypertension Mother     Heart Disease Mother     Cancer Father     Heart Disease Brother     Diabetes Brother     Heart Disease Brother      Current Outpatient Prescriptions   Medication Sig Dispense Refill    bumetanide (BUMEX) 2 mg tablet   0    CARTIA  mg ER capsule   0    tamsulosin (FLOMAX) 0.4 mg capsule take 1 capsule by mouth every evening 90 Cap 3    metFORMIN (GLUCOPHAGE) 500 mg tablet Take  by mouth two (2) times daily (with meals).  timolol (TIMOPTIC) 0.5 % ophthalmic solution 1 Drop two (2) times a day.  ELIQUIS 5 mg tablet   0    atorvastatin (LIPITOR) 40 mg tablet take 1 tablet by mouth at bedtime for cholesterol  0    fenofibrate micronized (LOFIBRA) 67 mg capsule take 1 capsule by mouth once daily  0    furosemide (LASIX) 20 mg tablet   0    levETIRAcetam (KEPPRA) 750 mg tablet take 1 tablet by mouth twice a day  0    carvedilol (COREG) 12.5 mg tablet Take  by mouth two (2) times daily (with meals).  losartan-hydrochlorothiazide (HYZAAR) 100-25 mg per tablet Take 1 Tab by mouth daily.  omeprazole (PRILOSEC) 10 mg capsule Take 10 mg by mouth daily. Review of Systems  Constitutional: The patient has no acute distress or discomfort.   HEENT: The patient denies recent head trauma, eye pain, blurred vision,  hearing deficit, oropharyngeal mucosal pain or lesions, and the patient denies throat pain or discomfort. Lymphatics: The patient denies palpable peripheral lymphadenopathy. Hematologic: The patient denies having bruising, bleeding, or progressive fatigue. Respiratory: Patient denies having shortness of breath, cough, sputum production, fever, or dyspnea on exertion. Cardiovascular: The patient denies having leg pain, leg swelling, heart palpitations, chest permit, chest pain, or lightheadedness. The patient denies having dyspnea on exertion. Gastrointestinal: The patient denies having nausea, emesis, or diarrhea. The patient denies having any hematemesis or blood in the stool. Genitourinary: Patient denies having urinary urgency, frequency, or dysuria. The patient denies having blood in the urine. Psychological: The patient denies having symptoms of nervousness, anxiety, depression, or thoughts of harming self. Skin: Patient denies having skin rashes, skin, ulcerations, or unexplained itching or pruritus. Musculoskeletal: The patient denies having pain in the joints or bones. Objective: There were no vitals taken for this visit. ECOG PS***  Physical Exam:   Gen. Appearance: The patient is in no acute distress. Skin: There is no bruise or rash. HEENT: The exam is unremarkable. Neck: Supple without lymphadenopathy or thyromegaly. Lungs: Clear to auscultation and percussion; there are no wheezes or rhonchi. Heart: Regular rate and rhythm; there are no murmurs, gallops, or rubs. Abdomen: Bowel sounds are present and normal.  There is no guarding, tenderness, or hepatosplenomegaly. Extremities: There is no clubbing, cyanosis, or edema. Neurologic: There are no focal neurologic deficits. Lymphatics: There is no palpable peripheral lymphadenopathy. Musculoskeletal: The patient has full range of motion at all joints. There is no evidence of joint deformity or effusions.   There is no focal joint tenderness. Psychological/psychiatric: There is no clinical evidence of anxiety, depression, or melancholy. Lab data:      Results for orders placed or performed during the hospital encounter of 03/12/18   CBC WITH 3 PART DIFF     Status: Abnormal   Result Value Ref Range Status    WBC 3.3 (L) 4.5 - 13.0 K/uL Final    RBC 3.34 (L) 4.10 - 5.10 M/uL Final    HGB 10.2 (L) 12.0 - 16 g/dL Final    HCT 31.0 (L) 36 - 48 % Final    MCV 92.8 78 - 102 FL Final    MCH 30.5 25.0 - 35.0 PG Final    MCHC 32.9 31 - 37 g/dL Final    RDW 17.4 (H) 11.5 - 14.5 % Final    PLATELET 923 380 - 531 K/uL Final    NEUTROPHILS 47 40 - 70 % Final    MIXED CELLS 17 0.1 - 17 % Final    LYMPHOCYTES 36 14 - 44 % Final    ABS. NEUTROPHILS 1.5 (L) 1.8 - 9.5 K/UL Final    ABS. MIXED CELLS 0.6 0.0 - 2.3 K/uL Final    ABS. LYMPHOCYTES 1.2 1.1 - 5.9 K/UL Final     Comment: Test performed at [place] [address] Location. Results Reviewed by Medical Director. DF AUTOMATED   Final           Assessment:     1. Anemia, chronic renal failure, stage 3 (moderate)    2. 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          Plan:     Orders Placed This Encounter    COMPLETE CBC & AUTO DIFF WBC    METABOLIC PANEL, COMPREHENSIVE     Standing Status:   Future     Standing Expiration Date:   4/19/2019    IRON PROFILE     Standing Status:   Future     Standing Expiration Date:   4/19/2019    FERRITIN     Standing Status:   Future     Standing Expiration Date:   4/19/2019    InHouse CBC (PR Slidesquest)     Standing Status:   Future     Number of Occurrences:   1     Standing Expiration Date:   4/25/2018       Frederick Mccormack MD  4/18/2018      Please note: This document has been produced using voice recognition software. Unrecognized errors in transcription may be present.

## 2018-04-18 NOTE — PATIENT INSTRUCTIONS
Anemia From Chronic Disease: Care Instructions  Your Care Instructions    Anemia is a low level of red blood cells. Red blood cells carry oxygen from your lungs to the rest of your body. Sometimes when you have a long-term (chronic) disease, such as kidney disease, arthritis, diabetes, cancer, or an infection, your body does not make enough red blood cells. Follow-up care is a key part of your treatment and safety. Be sure to make and go to all appointments, and call your doctor if you are having problems. It's also a good idea to know your test results and keep a list of the medicines you take. How can you care for yourself at home? · Follow your doctor's instructions to treat the chronic condition that is causing the anemia. · Be safe with medicines. Take your medicine to treat your chronic condition exactly as prescribed. Call your doctor if you think you are having a problem with your medicine. · Take your medicine for anemia exactly as prescribed. Call your doctor if you think you are having a problem with your medicine. Medicines to increase the number of red blood cells (such as epoetin or darbepoetin) may be given as an injection. ¨ If you miss a dose, take it as soon as you can, unless it is almost time for your next dose. In that case, get back on your regular schedule and take only one dose. ¨ Do not freeze this medicine. Store it in the refrigerator. Do not shake the bottle before you prepare the shot. · Keep all your appointments for blood tests to check on your hemoglobin levels. When should you call for help? Call 911 anytime you think you may need emergency care. For example, call if:  ? · You passed out (lost consciousness). ?Call your doctor now or seek immediate medical care if:  ? · You are short of breath. ? · You are dizzy or light-headed, or you feel like you may faint. ? · You have new or worse bleeding. ? Watch closely for changes in your health, and be sure to contact your doctor if:  ? · You feel weaker or more tired than usual.   ? · You do not get better as expected. Where can you learn more? Go to http://nasrin-deejay.info/. Enter E502 in the search box to learn more about \"Anemia From Chronic Disease: Care Instructions. \"  Current as of: October 13, 2016  Content Version: 11.4  © 2000-0819 [employer]. Care instructions adapted under license by [employer] (which disclaims liability or warranty for this information). If you have questions about a medical condition or this instruction, always ask your healthcare professional. [place] [address] any warranty or liability for your use of this information.

## 2018-04-18 NOTE — MR AVS SNAPSHOT
[address] [facility] 
 
 
 [address], [facility] 123 200 [facility] 
[phone] Patient: Melany Mathews MRN: [id number] FTC:[id number] Visit Information Date & Time Provider Department Dept. Phone Encounter #  
 4/18/2018  9:15 AM Keiko Antoine MD [facility] [phone] [id number] Follow-up Instructions Return in about 2 months (around 6/18/2018). Your Appointments 6/18/2018  9:30 AM  
Office Visit with Keiko Antoine MD  
[address]  On[facility]) Appt Note: 2 months [address], [facility] 670 [facility] 250 [facility]  
  
   
 [facility] [facility] [address] [address] [address]  
  
    
 1/29/2019  9:30 AM  
Nurse Visit with Marcella Blount [facility] ([facility]) Appt Note: PSA  
 [address] [facility] 300 [facility]  
  
    
  
 2/12/2019 10:00 AM  
Any with Ishan Gordillo MD  
[facility] ([facility]) Appt Note: 1 FLOW PVR UA PSA PRIORS  
 [address] [facility] [place] [facility] [address]  
  
   
 709 [facility] [address] [address] [address] Upcoming Health Maintenance Date Due DTaP/Tdap/Td series (1 - Tdap) 3/7/1963 ZOSTER VACCINE AGE 60> 1/7/2002 GLAUCOMA SCREENING Q2Y 3/7/2007 Pneumococcal 65+ High/Highest Risk (1 of 2 - PCV13) 3/7/2007 Influenza Age 5 to Adult 8/1/2017 MEDICARE YEARLY EXAM 3/14/2018 Allergies as of 4/18/2018  Review Complete On: 4/18/2018 By: Keiko Antoine MD  
 No Known Allergies Current Immunizations  Never Reviewed No immunizations on file. Not reviewed this visit You Were Diagnosed With   
  
 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  
  
   
This list is accurate as of 4/18/18  9:47 AM.  Always use your most recent med list.  
  
  
  
  
 atorvastatin 40 mg tablet Commonly known as:  LIPITOR  
take 1 tablet by mouth at bedtime for cholesterol  
  
 bumetanide 2 mg tablet Commonly known as:  Tura Symone CARTIA  mg ER capsule Generic drug:  dilTIAZem CD  
  
 carvedilol 12.5 mg tablet Commonly known as:  Janalyn Yahir Take  by mouth two (2) times daily (with meals). ELIQUIS 5 mg tablet Generic drug:  apixaban  
  
 fenofibrate micronized 67 mg capsule Commonly known as:  LOFIBRA  
take 1 capsule by mouth once daily  
  
 furosemide 20 mg tablet Commonly known as:  LASIX  
  
 levETIRAcetam 750 mg tablet Commonly known as:  KEPPRA  
take 1 tablet by mouth twice a day  
  
 losartan-hydroCHLOROthiazide 100-25 mg per tablet Commonly known as:  HYZAAR Take 1 Tab by mouth daily. metFORMIN 500 mg tablet Commonly known as:  GLUCOPHAGE Take  by mouth two (2) times daily (with meals). omeprazole 10 mg capsule Commonly known as:  PRILOSEC Take 10 mg by mouth daily. tamsulosin 0.4 mg capsule Commonly known as:  FLOMAX  
take 1 capsule by mouth every evening  
  
 timolol 0.5 % ophthalmic solution Commonly known as:  TIMOPTIC  
1 Drop two (2) times a day. We Performed the Following COMPLETE CBC & AUTO DIFF WBC [25084 CPT(R)] Follow-up Instructions Return in about 2 months (around 6/18/2018). To-Do List   
 04/18/2018 Lab:  CBC WITH 3 PART DIFF Patient Instructions Anemia From Chronic Disease: Care Instructions Your Care Instructions Anemia is a low level of red blood cells. Red blood cells carry oxygen from your lungs to the rest of your body. Sometimes when you have a long-term (chronic) disease, such as kidney disease, arthritis, diabetes, cancer, or an infection, your body does not make enough red blood cells. Follow-up care is a key part of your treatment and safety. Be sure to make and go to all appointments, and call your doctor if you are having problems. It's also a good idea to know your test results and keep a list of the medicines you take. How can you care for yourself at home? · Follow your doctor's instructions to treat the chronic condition that is causing the anemia. · Be safe with medicines. Take your medicine to treat your chronic condition exactly as prescribed. Call your doctor if you think you are having a problem with your medicine. · Take your medicine for anemia exactly as prescribed. Call your doctor if you think you are having a problem with your medicine. Medicines to increase the number of red blood cells (such as epoetin or darbepoetin) may be given as an injection. ¨ If you miss a dose, take it as soon as you can, unless it is almost time for your next dose. In that case, get back on your regular schedule and take only one dose. ¨ Do not freeze this medicine. Store it in the refrigerator. Do not shake the bottle before you prepare the shot. · Keep all your appointments for blood tests to check on your hemoglobin levels. When should you call for help? Call 911 anytime you think you may need emergency care. For example, call if: 
? · You passed out (lost consciousness). ?Call your doctor now or seek immediate medical care if: 
? · You are short of breath. ? · You are dizzy or light-headed, or you feel like you may faint. ? · You have new or worse bleeding. ?Watch closely for changes in your health, and be sure to contact your doctor if: 
? · You feel weaker or more tired than usual.  
? · You do not get better as expected. Where can you learn more? Go to http://nasrin-deejay.info/. Enter E502 in the search box to learn more about \"Anemia From Chronic Disease: Care Instructions. \" Current as of: October 13, 2016 Content Version: 11.4 © 8767-5480 [employer]. Care instructions adapted under license by [employer] (which disclaims liability or warranty for this information). If you have questions about a medical condition or this instruction, always ask your healthcare professional. [address] any warranty or liability for your use of this information. Introducing [facility]! [facility] introduces PaeDae patient portal. Now you can access parts of your medical record, email your doctor's office, and request medication refills online. 1. In your internet browser, go to [email or web address] 2. Click on the First Time User? Click Here link in the Sign In box. You will see the New Member Sign Up page. 3. Enter your PaeDae Access Code exactly as it appears below. You will not need to use this code after youve completed the sign-up process. If you do not sign up before the expiration date, you must request a new code. · PaeDae Access Code: [email or web address] Expires: 5/3/2018  4:30 PM 
 
4. Enter the last four digits of your Social Security Number (xxxx) and Date of Birth (mm/dd/yyyy) as indicated and click Submit. You will be taken to the next sign-up page. 5. Create a PaeDae ID. This will be your PaeDae login ID and cannot be changed, so think of one that is secure and easy to remember. 6. Create a PaeDae password. You can change your password at any time. 7. Enter your Password Reset Question and Answer.  This can be used at a later time if you forget your password. 8. Enter your e-mail address. You will receive e-mail notification when new information is available in [address] 9. Click Sign Up. You can now view and download portions of your medical record. 10. Click the Download Summary menu link to download a portable copy of your medical information. If you have questions, please visit the Frequently Asked Questions section of the Compufirst website. Remember, Compufirst is NOT to be used for urgent needs. For medical emergencies, dial 911. Now available from your iPhone and Android! Please provide this summary of care documentation to your next provider. Your primary care clinician is listed as Natalio Johnson. If you have any questions after today's visit, please call [phone].

## 2018-04-21 NOTE — PROGRESS NOTES
Hematology/Oncology  Progress Note    Name: Elida Weaver  Date: 2018  : 1940    PCP: Yani Norman MD     Mr. Teetee Crabtree is a 70 y.o. man who is here for reassessment of his anemia associated with chronic kidney disease. Current therapy: Procrit at a dose of 60,000 units whenever the hemoglobin is below 10 g/dL and hematocrit is below 30%. Subjective:     Mr. Teetee Crabtree is a 61-year-old man who has a combination of iron deficiency and anemia associated with chronic kidney disease. He is here today for follow-up visit reporting that he has been doing reasonably well. He has not required any Procrit therapy. Past medical history, family history, and social history: these were reviewed and remains unchanged. Past Medical History:   Diagnosis Date    Hypercholesterolemia     Hypertension     Stroke (cerebrum) ([facility] [address]      No past surgical history on file. Social History     Social History    Marital status: SINGLE     Spouse name: N/A    Number of children: N/A    Years of education: N/A     Occupational History    Not on file. Social History Main Topics    Smoking status: Never Smoker    Smokeless tobacco: Never Used    Alcohol use No    Drug use: Not on file    Sexual activity: Not on file     Other Topics Concern    Not on file     Social History Narrative     Family History   Problem Relation Age of Onset    Diabetes Mother     Hypertension Mother     Heart Disease Mother     Cancer Father     Heart Disease Brother     Diabetes Brother     Heart Disease Brother      Current Outpatient Prescriptions   Medication Sig Dispense Refill    bumetanide (BUMEX) 2 mg tablet   0    CARTIA  mg ER capsule   0    tamsulosin (FLOMAX) 0.4 mg capsule take 1 capsule by mouth every evening 90 Cap 3    metFORMIN (GLUCOPHAGE) 500 mg tablet Take  by mouth two (2) times daily (with meals).  timolol (TIMOPTIC) 0.5 % ophthalmic solution 1 Drop two (2) times a day.       ELIQUIS 5 mg tablet   0    atorvastatin (LIPITOR) 40 mg tablet take 1 tablet by mouth at bedtime for cholesterol  0    fenofibrate micronized (LOFIBRA) 67 mg capsule take 1 capsule by mouth once daily  0    furosemide (LASIX) 20 mg tablet   0    levETIRAcetam (KEPPRA) 750 mg tablet take 1 tablet by mouth twice a day  0    carvedilol (COREG) 12.5 mg tablet Take  by mouth two (2) times daily (with meals).  losartan-hydrochlorothiazide (HYZAAR) 100-25 mg per tablet Take 1 Tab by mouth daily.  omeprazole (PRILOSEC) 10 mg capsule Take 10 mg by mouth daily. Review of Systems  Constitutional: The patient has no acute distress or discomfort. HEENT: The patient denies recent head trauma, eye pain, blurred vision,  hearing deficit, oropharyngeal mucosal pain or lesions, and the patient denies throat pain or discomfort. Lymphatics: The patient denies palpable peripheral lymphadenopathy. Hematologic: The patient denies having bruising, bleeding, or progressive fatigue. Respiratory: Patient denies having shortness of breath, cough, sputum production, fever, or dyspnea on exertion. Cardiovascular: The patient denies having leg pain, leg swelling, heart palpitations, chest permit, chest pain, or lightheadedness. The patient denies having dyspnea on exertion. Gastrointestinal: The patient denies having nausea, emesis, or diarrhea. The patient denies having any hematemesis or blood in the stool. Genitourinary: Patient denies having urinary urgency, frequency, or dysuria. The patient denies having blood in the urine. Psychological: The patient denies having symptoms of nervousness, anxiety, depression, or thoughts of harming self. Skin: Patient denies having skin rashes, skin, ulcerations, or unexplained itching or pruritus. Musculoskeletal: The patient denies having pain in the joints or bones.       Objective:     Visit Vitals    /76    Pulse 66    Temp 97.4 °F (36.3 °C)     ECOG PS=0  Physical Exam:   Gen. Appearance: The patient is in no acute distress. Skin: There is no bruise or rash. HEENT: The exam is unremarkable. Neck: Supple without lymphadenopathy or thyromegaly. Lungs: Clear to auscultation and percussion; there are no wheezes or rhonchi. Heart: Regular rate and rhythm; there are no murmurs, gallops, or rubs. Abdomen: Bowel sounds are present and normal.  There is no guarding, tenderness, or hepatosplenomegaly. Extremities: There is no clubbing, cyanosis, or edema. Neurologic: There are no focal neurologic deficits. Lymphatics: There is no palpable peripheral lymphadenopathy. Musculoskeletal: The patient has full range of motion at all joints. There is no evidence of joint deformity or effusions. There is no focal joint tenderness. Psychological/psychiatric: There is no clinical evidence of anxiety, depression, or melancholy. Lab data:      Results for orders placed or performed during the hospital encounter of 04/18/18   CBC WITH 3 PART DIFF     Status: Abnormal   Result Value Ref Range Status    WBC 4.2 (L) 4.5 - 13.0 K/uL Final    RBC 3.08 (L) 4.10 - 5.10 M/uL Final    HGB 9.2 (L) 12.0 - 16 g/dL Final    HCT 29.0 (L) 36 - 48 % Final    MCV 94.2 78 - 102 FL Final    MCH 29.9 25.0 - 35.0 PG Final    MCHC 31.7 31 - 37 g/dL Final    RDW 16.8 (H) 11.5 - 14.5 % Final    PLATELET 814 579 - 551 K/uL Final    NEUTROPHILS 61 40 - 70 % Final    MIXED CELLS 15 0.1 - 17 % Final    LYMPHOCYTES 25 14 - 44 % Final    ABS. NEUTROPHILS 2.6 1.8 - 9.5 K/UL Final    ABS. MIXED CELLS 0.6 0.0 - 2.3 K/uL Final    ABS. LYMPHOCYTES 1.0 (L) 1.1 - 5.9 K/UL Final     Comment: Test performed at [place] [address] Location. Results Reviewed by Medical Director. DF AUTOMATED   Final           Assessment:     1. Anemia, chronic renal failure, stage 3 (moderate)    2.  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      Plan:   Immune chronic renal failure: Have explained to the patient that his CBC from today shows that his hemoglobin is now 9.2 g/dL with hematocrit of 29.2%. The patient will be offered Procrit at a dose of 60,000 units subcutaneous. Treatment will begin today 4/18/2018. The treatment will repeat every 2 weeks whenever the hemoglobin is below 10 g/dL hematocrit is below 30%. Iron deficiency anemia: The ferritin levels from 3/12/2018 was 160 ng/mL, iron was 65 mcg/dL, and his iron saturation was 20%. I have explained to the patient that his iron deficiency is corrected at this time. We will continue to monitor him at 8 week intervals. Follow-up in 2 months  Orders Placed This Encounter    COMPLETE CBC & AUTO DIFF WBC    METABOLIC PANEL, COMPREHENSIVE     Standing Status:   Future     Number of Occurrences:   1     Standing Expiration Date:   4/19/2019    IRON PROFILE     Standing Status:   Future     Number of Occurrences:   1     Standing Expiration Date:   4/19/2019    FERRITIN     Standing Status:   Future     Number of Occurrences:   1     Standing Expiration Date:   4/19/2019    InHouse CBC ([employer])     Standing Status:   Future     Number of Occurrences:   1     Standing Expiration Date:   4/25/2018       Ifeoma Vernon MD  4/18/2018      Please note: This document has been produced using voice recognition software. Unrecognized errors in transcription may be present.

## 2018-06-18 ENCOUNTER — DOCUMENTATION ONLY (OUTPATIENT)
Dept: ONCOLOGY | Age: 78
End: 2018-06-18

## 2018-06-18 ENCOUNTER — HOSPITAL ENCOUNTER (OUTPATIENT)
Dept: ONCOLOGY | Age: 78
Discharge: HOME | End: 2018-06-18

## 2018-06-18 ENCOUNTER — HOSPITAL ENCOUNTER (OUTPATIENT)
Dept: LAB | Age: 78
Discharge: HOME | End: 2018-06-18
Payer: MEDICARE

## 2018-06-18 ENCOUNTER — OFFICE VISIT (OUTPATIENT)
Dept: ONCOLOGY | Age: 78
End: 2018-06-18

## 2018-06-18 VITALS
HEART RATE: 70 BPM | SYSTOLIC BLOOD PRESSURE: 191 MMHG | WEIGHT: 212 LBS | TEMPERATURE: 96.8 F | DIASTOLIC BLOOD PRESSURE: 100 MMHG | BODY MASS INDEX: 31.31 KG/M2

## 2018-06-18 DIAGNOSIS — D63.1 ANEMIA, CHRONIC RENAL FAILURE, STAGE 3 (MODERATE) (HCC): Primary | ICD-10-CM

## 2018-06-18 DIAGNOSIS — N18.30 ANEMIA, CHRONIC RENAL FAILURE, STAGE 3 (MODERATE) (HCC): ICD-10-CM

## 2018-06-18 DIAGNOSIS — D63.1 ANEMIA, CHRONIC RENAL FAILURE, STAGE 3 (MODERATE) (HCC): ICD-10-CM

## 2018-06-18 DIAGNOSIS — D50.8 IRON DEFICIENCY ANEMIA SECONDARY TO INADEQUATE DIETARY IRON INTAKE: ICD-10-CM

## 2018-06-18 DIAGNOSIS — N18.30 ANEMIA, CHRONIC RENAL FAILURE, STAGE 3 (MODERATE) (HCC): Primary | ICD-10-CM

## 2018-06-18 LAB
ALBUMIN SERPL-MCNC: 3.8 G/DL (ref 3.4–5)
ALBUMIN/GLOB SERPL: 1.2 {RATIO} (ref 0.8–1.7)
ALP SERPL-CCNC: 87 U/L (ref 45–117)
ALT SERPL-CCNC: 26 U/L (ref 16–61)
ANION GAP SERPL CALC-SCNC: 7 MMOL/L (ref 3–18)
AST SERPL-CCNC: 25 U/L (ref 15–37)
BASO+EOS+MONOS # BLD AUTO: 0.8 K/UL (ref 0–2.3)
BASO+EOS+MONOS # BLD AUTO: 16 % (ref 0.1–17)
BILIRUB SERPL-MCNC: 0.6 MG/DL (ref 0.2–1)
BUN SERPL-MCNC: 19 MG/DL (ref 7–18)
BUN/CREAT SERPL: 14 (ref 12–20)
CALCIUM SERPL-MCNC: 8.9 MG/DL (ref 8.5–10.1)
CHLORIDE SERPL-SCNC: 109 MMOL/L (ref 100–108)
CO2 SERPL-SCNC: 26 MMOL/L (ref 21–32)
CREAT SERPL-MCNC: 1.36 MG/DL (ref 0.6–1.3)
DIFFERENTIAL METHOD BLD: ABNORMAL
ERYTHROCYTE [DISTWIDTH] IN BLOOD BY AUTOMATED COUNT: 16.4 % (ref 11.5–14.5)
FERRITIN SERPL-MCNC: 29 NG/ML (ref 8–388)
GLOBULIN SER CALC-MCNC: 3.2 G/DL (ref 2–4)
GLUCOSE SERPL-MCNC: 81 MG/DL (ref 74–99)
HCT VFR BLD AUTO: 30.2 % (ref 36–48)
HGB BLD-MCNC: 9.8 G/DL (ref 12–16)
IRON SATN MFR SERPL: 12 %
IRON SERPL-MCNC: 43 UG/DL (ref 50–175)
LYMPHOCYTES # BLD: 1.3 K/UL (ref 1.1–5.9)
LYMPHOCYTES NFR BLD: 26 % (ref 14–44)
MCH RBC QN AUTO: 28.4 PG (ref 25–35)
MCHC RBC AUTO-ENTMCNC: 32.5 G/DL (ref 31–37)
MCV RBC AUTO: 87.5 FL (ref 78–102)
NEUTS SEG # BLD: 3.1 K/UL (ref 1.8–9.5)
NEUTS SEG NFR BLD: 58 % (ref 40–70)
PLATELET # BLD AUTO: 190 K/UL (ref 140–440)
POTASSIUM SERPL-SCNC: 4.2 MMOL/L (ref 3.5–5.5)
PROT SERPL-MCNC: 7 G/DL (ref 6.4–8.2)
RBC # BLD AUTO: 3.45 M/UL (ref 4.1–5.1)
SODIUM SERPL-SCNC: 142 MMOL/L (ref 136–145)
TIBC SERPL-MCNC: 349 UG/DL (ref 250–450)
WBC # BLD AUTO: 5.2 K/UL (ref 4.5–13)

## 2018-06-18 PROCEDURE — 82728 ASSAY OF FERRITIN: CPT | Performed by: INTERNAL MEDICINE

## 2018-06-18 PROCEDURE — 80053 COMPREHEN METABOLIC PANEL: CPT | Performed by: INTERNAL MEDICINE

## 2018-06-18 PROCEDURE — 36415 COLL VENOUS BLD VENIPUNCTURE: CPT | Performed by: INTERNAL MEDICINE

## 2018-06-18 PROCEDURE — 83540 ASSAY OF IRON: CPT | Performed by: INTERNAL MEDICINE

## 2018-06-18 NOTE — PATIENT INSTRUCTIONS
Anemia From Chronic Disease: Care Instructions  Your Care Instructions    Anemia is a low level of red blood cells. Red blood cells carry oxygen from your lungs to the rest of your body. Sometimes when you have a long-term (chronic) disease, such as kidney disease, arthritis, diabetes, cancer, or an infection, your body does not make enough red blood cells. Follow-up care is a key part of your treatment and safety. Be sure to make and go to all appointments, and call your doctor if you are having problems. It's also a good idea to know your test results and keep a list of the medicines you take. How can you care for yourself at home? · Follow your doctor's instructions to treat the chronic condition that is causing the anemia. · Be safe with medicines. Take your medicine to treat your chronic condition exactly as prescribed. Call your doctor if you think you are having a problem with your medicine. · Take your medicine for anemia exactly as prescribed. Call your doctor if you think you are having a problem with your medicine. Medicines to increase the number of red blood cells (such as epoetin or darbepoetin) may be given as an injection. ¨ If you miss a dose, take it as soon as you can, unless it is almost time for your next dose. In that case, get back on your regular schedule and take only one dose. ¨ Do not freeze this medicine. Store it in the refrigerator. Do not shake the bottle before you prepare the shot. · Keep all your appointments for blood tests to check on your hemoglobin levels. When should you call for help? Call 911 anytime you think you may need emergency care. For example, call if:  ? · You passed out (lost consciousness). ?Call your doctor now or seek immediate medical care if:  ? · You are short of breath. ? · You are dizzy or light-headed, or you feel like you may faint. ? · You have new or worse bleeding. ? Watch closely for changes in your health, and be sure to contact your doctor if:  ? · You feel weaker or more tired than usual.   ? · You do not get better as expected. Where can you learn more? Go to http://nasrin-deejay.info/. Enter E502 in the search box to learn more about \"Anemia From Chronic Disease: Care Instructions. \"  Current as of: October 13, 2016  Content Version: 11.4  © 3957-7280 [employer]. Care instructions adapted under license by [employer] (which disclaims liability or warranty for this information). If you have questions about a medical condition or this instruction, always ask your healthcare professional. [place] [address] any warranty or liability for your use of this information.

## 2018-06-18 NOTE — MR AVS SNAPSHOT
[address] [facility] 
 
 
 [address], [place] [address] [address] [facility] 
[phone] Patient: Gerardo Colorado MRN: [id number] VBI:3[birth year]9/[birth year]172 Visit Information Date & Time Provider Department Dept. Phone Encounter #  
 6/18/2018  9:30 AM Stella Reich MD [facility] Oncology [phone] [id number] Follow-up Instructions Return in about 2 months (around 8/18/2018). Your Appointments 8/20/2018  9:15 AM  
Office Visit with Stella Reich MD  
[address]  Oncology [address] [facility]) Appt Note: 8 weeks [facility] [address], [place] [address] [facility] 250 [facility] 207, [facility]7 [facility] [address]  
  
    
 1/29/2019  9:30 AM  
Nurse Visit with [facility] ([address] [facility]) Appt Note: PSA  
 [address] [facility] [address]  
  
   
 [address]  
  
    
  
 2/12/2019 10:00 AM  
Any with Donato Adams MD  
[facility] ([address] [facility]) Appt Note: 1 FLOW PVR UA PSA PRIORS  
 [address] [facility] [address]  
  
   
 [address] [facility] [facility] Upcoming Health Maintenance Date Due DTaP/Tdap/Td series (1 - Tdap) 3/7/1961 ZOSTER VACCINE AGE 60> 1/7/2002 GLAUCOMA SCREENING Q2Y 3/7/2007 Pneumococcal 65+ High/Highest Risk (1 of 2 - PCV13) 3/7/2007 MEDICARE YEARLY EXAM 3/14/2018 Influenza Age 5 to Adult 8/1/2018 Allergies as of 6/18/2018  Review Complete On: 6/18/2018 By: Stella Reich MD  
 No Known Allergies Current Immunizations  Never Reviewed No immunizations on file. Not reviewed this visit You Were Diagnosed With   
  
 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  
  
   
This list is accurate as of 6/18/18 10:15 AM.  Always use your most recent med list.  
  
  
  
  
 atorvastatin 40 mg tablet Commonly known as:  LIPITOR  
take 1 tablet by mouth at bedtime for cholesterol  
  
 bumetanide 2 mg tablet Commonly known as:  [place] CARTIA  mg ER capsule Generic drug:  dilTIAZem CD  
  
 carvedilol 12.5 mg tablet Commonly known as:  Juan Jose Heal Take  by mouth two (2) times daily (with meals). ELIQUIS 5 mg tablet Generic drug:  apixaban  
  
 fenofibrate micronized 67 mg capsule Commonly known as:  LOFIBRA  
take 1 capsule by mouth once daily  
  
 furosemide 20 mg tablet Commonly known as:  LASIX  
  
 levETIRAcetam 750 mg tablet Commonly known as:  KEPPRA  
take 1 tablet by mouth twice a day  
  
 losartan-hydroCHLOROthiazide 100-25 mg per tablet Commonly known as:  HYZAAR Take 1 Tab by mouth daily. metFORMIN 500 mg tablet Commonly known as:  GLUCOPHAGE Take  by mouth two (2) times daily (with meals). omeprazole 10 mg capsule Commonly known as:  PRILOSEC Take 10 mg by mouth daily. tamsulosin 0.4 mg capsule Commonly known as:  FLOMAX  
take 1 capsule by mouth every evening  
  
 timolol 0.5 % ophthalmic solution Commonly known as:  TIMOPTIC  
1 Drop two (2) times a day. We Performed the Following COMPLETE CBC & AUTO DIFF WBC [20322 CPT(R)] Follow-up Instructions Return in about 2 months (around 8/18/2018). To-Do List   
 06/18/2018 Lab:  CBC WITH 3 PART DIFF Patient Instructions Anemia From Chronic Disease: Care Instructions Your Care Instructions Anemia is a low level of red blood cells. Red blood cells carry oxygen from your lungs to the rest of your body. Sometimes when you have a long-term (chronic) disease, such as kidney disease, arthritis, diabetes, cancer, or an infection, your body does not make enough red blood cells. Follow-up care is a key part of your treatment and safety. Be sure to make and go to all appointments, and call your doctor if you are having problems. It's also a good idea to know your test results and keep a list of the medicines you take. How can you care for yourself at home? · Follow your doctor's instructions to treat the chronic condition that is causing the anemia. · Be safe with medicines. Take your medicine to treat your chronic condition exactly as prescribed. Call your doctor if you think you are having a problem with your medicine. · Take your medicine for anemia exactly as prescribed. Call your doctor if you think you are having a problem with your medicine. Medicines to increase the number of red blood cells (such as epoetin or darbepoetin) may be given as an injection. ¨ If you miss a dose, take it as soon as you can, unless it is almost time for your next dose. In that case, get back on your regular schedule and take only one dose. ¨ Do not freeze this medicine. Store it in the refrigerator. Do not shake the bottle before you prepare the shot. · Keep all your appointments for blood tests to check on your hemoglobin levels. When should you call for help? Call 911 anytime you think you may need emergency care. For example, call if: 
? · You passed out (lost consciousness). ?Call your doctor now or seek immediate medical care if: 
? · You are short of breath. ? · You are dizzy or light-headed, or you feel like you may faint. ? · You have new or worse bleeding. ?Watch closely for changes in your health, and be sure to contact your doctor if: 
? · You feel weaker or more tired than usual.  
? · You do not get better as expected. Where can you learn more? Go to http://nasrin-deejay.info/. Enter E502 in the search box to learn more about \"Anemia From Chronic Disease: Care Instructions. \" Current as of: October 13, 2016 Content Version: 11.4 © 8121-4411 [employer]. Care instructions adapted under license by [employer] (which disclaims liability or warranty for this information). If you have questions about a medical condition or this instruction, always ask your healthcare professional. [address] any warranty or liability for your use of this information. Introducing [facility] & HE[facility]! [employer] introduces Journeys patient portal. Now you can access parts of your medical record, email your doctor's office, and request medication refills online. 1. In your internet browser, go to [email or web address] 2. Click on the First Time User? Click Here link in the Sign In box. You will see the New Member Sign Up page. 3. Enter your Journeys Access Code exactly as it appears below. You will not need to use this code after youve completed the sign-up process. If you do not sign up before the expiration date, you must request a new code. · Journeys Access Code: [email or web address] Expires: 9/16/2018  8:47 AM 
 
4. Enter the last four digits of your Social Security Number (xxxx) and Date of Birth (mm/dd/yyyy) as indicated and click Submit. You will be taken to the next sign-up page. 5. Create a SimplyCastt ID. This will be your Journeys login ID and cannot be changed, so think of one that is secure and easy to remember. 6. Create a Journeys password. You can change your password at any time. 7. Enter your Password Reset Question and Answer.  This can be used at a later time if you forget your password. 8. Enter your e-mail address. You will receive e-mail notification when new information is available in [address] 9. Click Sign Up. You can now view and download portions of your medical record. 10. Click the Download Summary menu link to download a portable copy of your medical information. If you have questions, please visit the Frequently Asked Questions section of the [employer] website. Remember, [employer] is NOT to be used for urgent needs. For medical emergencies, dial 911. Now available from your iPhone and Android! Please provide this summary of care documentation to your next provider. Your primary care clinician is listed as Waldemar Fothergill. If you have any questions after today's visit, please call [phone].

## 2018-06-18 NOTE — PROGRESS NOTES
Hematology/Oncology  Progress Note    Name: Melany Mathews  Date: 2018  : 1940    PCP: Nancy Fox MD     Mr. Zoe Severe is a 70 y.o. man who is here for reassessment of his anemia associated with chronic kidney disease. Current therapy: Procrit at a dose of 60,000 units whenever the hemoglobin is below 10 g/dL and hematocrit is below 30%. Subjective:     Mr. Zoe Severe is a 79-year-old man who has a combination of iron deficiency and anemia associated with chronic kidney disease. He is here today for follow-up visit reporting that he has been doing reasonably well. He has not required any Procrit therapy. The patient is tentatively scheduled to have left hip replacement surgery on 7/3/2018 and is requesting clearance    Past medical history, family history, and social history: these were reviewed and remains unchanged. Past Medical History:   Diagnosis Date    Hypercholesterolemia     Hypertension     Stroke (cerebrum) ([facility] [address]      No past surgical history on file. Social History     Social History    Marital status: SINGLE     Spouse name: N/A    Number of children: N/A    Years of education: N/A     Occupational History    Not on file.      Social History Main Topics    Smoking status: Never Smoker    Smokeless tobacco: Never Used    Alcohol use No    Drug use: Not on file    Sexual activity: Not on file     Other Topics Concern    Not on file     Social History Narrative     Family History   Problem Relation Age of Onset    Diabetes Mother     Hypertension Mother     Heart Disease Mother     Cancer Father     Heart Disease Brother     Diabetes Brother     Heart Disease Brother      Current Outpatient Prescriptions   Medication Sig Dispense Refill    bumetanide (BUMEX) 2 mg tablet   0    CARTIA  mg ER capsule   0    tamsulosin (FLOMAX) 0.4 mg capsule take 1 capsule by mouth every evening 90 Cap 3    metFORMIN (GLUCOPHAGE) 500 mg tablet Take  by mouth two (2) times daily (with meals).  timolol (TIMOPTIC) 0.5 % ophthalmic solution 1 Drop two (2) times a day.  ELIQUIS 5 mg tablet   0    atorvastatin (LIPITOR) 40 mg tablet take 1 tablet by mouth at bedtime for cholesterol  0    fenofibrate micronized (LOFIBRA) 67 mg capsule take 1 capsule by mouth once daily  0    furosemide (LASIX) 20 mg tablet   0    levETIRAcetam (KEPPRA) 750 mg tablet take 1 tablet by mouth twice a day  0    carvedilol (COREG) 12.5 mg tablet Take  by mouth two (2) times daily (with meals).  losartan-hydrochlorothiazide (HYZAAR) 100-25 mg per tablet Take 1 Tab by mouth daily.  omeprazole (PRILOSEC) 10 mg capsule Take 10 mg by mouth daily. Review of Systems  Constitutional: The patient has no acute distress or discomfort. HEENT: The patient denies recent head trauma, eye pain, blurred vision,  hearing deficit, oropharyngeal mucosal pain or lesions, and the patient denies throat pain or discomfort. Lymphatics: The patient denies palpable peripheral lymphadenopathy. Hematologic: The patient denies having bruising, bleeding, or progressive fatigue. Respiratory: Patient denies having shortness of breath, cough, sputum production, fever, or dyspnea on exertion. Cardiovascular: The patient denies having leg pain, leg swelling, heart palpitations, chest permit, chest pain, or lightheadedness. The patient denies having dyspnea on exertion. Gastrointestinal: The patient denies having nausea, emesis, or diarrhea. The patient denies having any hematemesis or blood in the stool. Genitourinary: Patient denies having urinary urgency, frequency, or dysuria. The patient denies having blood in the urine. Psychological: The patient denies having symptoms of nervousness, anxiety, depression, or thoughts of harming self. Skin: Patient denies having skin rashes, skin, ulcerations, or unexplained itching or pruritus.   Musculoskeletal: The patient denies having pain in the joints or bones.      Objective:     Visit Vitals    BP (!) 191/100    Pulse 70    Temp 96.8 °F (36 °C)    Wt 96.2 kg (212 lb)    BMI 31.31 kg/m2     ECOG PS=0  Physical Exam:   Gen. Appearance: The patient is in no acute distress. Skin: There is no bruise or rash. HEENT: The exam is unremarkable. Neck: Supple without lymphadenopathy or thyromegaly. Lungs: Clear to auscultation and percussion; there are no wheezes or rhonchi. Heart: Regular rate and rhythm; there are no murmurs, gallops, or rubs. Abdomen: Bowel sounds are present and normal.  There is no guarding, tenderness, or hepatosplenomegaly. Extremities: There is no clubbing, cyanosis, or edema. Neurologic: There are no focal neurologic deficits. Lymphatics: There is no palpable peripheral lymphadenopathy. Musculoskeletal: The patient has full range of motion at all joints. There is no evidence of joint deformity or effusions. There is no focal joint tenderness. Psychological/psychiatric: There is no clinical evidence of anxiety, depression, or melancholy. Lab data:      Results for orders placed or performed during the hospital encounter of 06/18/18   CBC WITH 3 PART DIFF     Status: Abnormal   Result Value Ref Range Status    WBC 5.2 4.5 - 13.0 K/uL Final    RBC 3.45 (L) 4.10 - 5.10 M/uL Final    HGB 9.8 (L) 12.0 - 16 g/dL Final    HCT 30.2 (L) 36 - 48 % Final    MCV 87.5 78 - 102 FL Final    MCH 28.4 25.0 - 35.0 PG Final    MCHC 32.5 31 - 37 g/dL Final    RDW 16.4 (H) 11.5 - 14.5 % Final    PLATELET 932 864 - 947 K/uL Final    NEUTROPHILS 58 40 - 70 % Final    MIXED CELLS 16 0.1 - 17 % Final    LYMPHOCYTES 26 14 - 44 % Final    ABS. NEUTROPHILS 3.1 1.8 - 9.5 K/UL Final    ABS. MIXED CELLS 0.8 0.0 - 2.3 K/uL Final    ABS. LYMPHOCYTES 1.3 1.1 - 5.9 K/UL Final     Comment: Test performed at [place] [address] Location. Results Reviewed by Medical Director. DF AUTOMATED   Final           Assessment:     1.  Anemia, chronic renal failure, stage 3 (moderate)    2. 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 The patient will be offered Procrit at a dose of 60,000 units subcutaneous. The patient is being scheduled to receive Procrit therapy whenever his hemoglobin is below 10 g/dL and hematocrit is below 30%. He understands that he does not meet criteria for Procrit at this time because his hematocrit exceeds 30%. Iron deficiency anemia: The ferritin levels from 3/12/2018 was 160 ng/mL, iron was 65 mcg/dL, and his iron saturation was 20%. I have explained to the patient that a follow-up iron profile from 4/18/2018 showed that his ferritin was normal at 69 ng/mL, iron was 30 mcg/dL and his iron saturation was 11%. His iron deficiency remains corrected. I have explained to the patient that his anemia is related solely to his chronic kidney disease. Transfusion of packed red blood cells is not indicated unless his hemoglobin should decline below 7 g/dL. His current hemoglobin is 9.8 g/dL. Therefore, I have cleared him for his tentatively schedule surgical procedure which is due to be done on 7/3/2018. Follow-up in 2 months  Orders Placed This Encounter    COMPLETE CBC & AUTO DIFF WBC    InHouse CBC ([employer])     Standing Status:   Future     Number of Occurrences:   1     Standing Expiration Date:   7/75/4247    METABOLIC PANEL, COMPREHENSIVE     Standing Status:   Future     Standing Expiration Date:   6/19/2019    IRON PROFILE     Standing Status:   Future     Standing Expiration Date:   6/19/2019    FERRITIN     Standing Status:   Future     Standing Expiration Date:   6/19/2019       Apolinar Clemons MD  6/18/2018      Please note: This document has been produced using voice recognition software.   Unrecognized errors in transcription may be present.

## 2018-08-20 ENCOUNTER — HOSPITAL ENCOUNTER (OUTPATIENT)
Dept: ONCOLOGY | Age: 78
Discharge: HOME | End: 2018-08-20

## 2018-08-20 DIAGNOSIS — D63.1 ANEMIA, CHRONIC RENAL FAILURE, STAGE 3 (MODERATE) (HCC): ICD-10-CM

## 2018-08-20 DIAGNOSIS — N18.30 ANEMIA, CHRONIC RENAL FAILURE, STAGE 3 (MODERATE) (HCC): ICD-10-CM

## 2018-10-01 ENCOUNTER — HOSPITAL ENCOUNTER (OUTPATIENT)
Dept: LAB | Age: 78
Discharge: HOME | End: 2018-10-01
Payer: MEDICARE

## 2018-10-01 ENCOUNTER — OFFICE VISIT (OUTPATIENT)
Dept: ONCOLOGY | Age: 78
End: 2018-10-01

## 2018-10-01 ENCOUNTER — HOSPITAL ENCOUNTER (OUTPATIENT)
Dept: ONCOLOGY | Age: 78
Discharge: HOME | End: 2018-10-01

## 2018-10-01 VITALS
WEIGHT: 212.2 LBS | BODY MASS INDEX: 31.43 KG/M2 | SYSTOLIC BLOOD PRESSURE: 124 MMHG | TEMPERATURE: 98.4 F | DIASTOLIC BLOOD PRESSURE: 62 MMHG | HEIGHT: 69 IN | HEART RATE: 51 BPM | RESPIRATION RATE: 18 BRPM

## 2018-10-01 DIAGNOSIS — N18.30 ANEMIA, CHRONIC RENAL FAILURE, STAGE 3 (MODERATE) (HCC): ICD-10-CM

## 2018-10-01 DIAGNOSIS — N18.30 ANEMIA, CHRONIC RENAL FAILURE, STAGE 3 (MODERATE) (HCC): Primary | ICD-10-CM

## 2018-10-01 DIAGNOSIS — D50.8 IRON DEFICIENCY ANEMIA SECONDARY TO INADEQUATE DIETARY IRON INTAKE: ICD-10-CM

## 2018-10-01 DIAGNOSIS — D63.1 ANEMIA, CHRONIC RENAL FAILURE, STAGE 3 (MODERATE) (HCC): Primary | ICD-10-CM

## 2018-10-01 DIAGNOSIS — D63.1 ANEMIA, CHRONIC RENAL FAILURE, STAGE 3 (MODERATE) (HCC): ICD-10-CM

## 2018-10-01 LAB
ALBUMIN SERPL-MCNC: 3.5 G/DL (ref 3.4–5)
ALBUMIN/GLOB SERPL: 1 {RATIO} (ref 0.8–1.7)
ALP SERPL-CCNC: 92 U/L (ref 45–117)
ALT SERPL-CCNC: 28 U/L (ref 16–61)
ANION GAP SERPL CALC-SCNC: 9 MMOL/L (ref 3–18)
AST SERPL-CCNC: 23 U/L (ref 15–37)
BASO+EOS+MONOS # BLD AUTO: 0.7 K/UL (ref 0–2.3)
BASO+EOS+MONOS # BLD AUTO: 14 % (ref 0.1–17)
BILIRUB SERPL-MCNC: 0.5 MG/DL (ref 0.2–1)
BUN SERPL-MCNC: 21 MG/DL (ref 7–18)
BUN/CREAT SERPL: 15 (ref 12–20)
CALCIUM SERPL-MCNC: 8.8 MG/DL (ref 8.5–10.1)
CHLORIDE SERPL-SCNC: 110 MMOL/L (ref 100–108)
CO2 SERPL-SCNC: 25 MMOL/L (ref 21–32)
CREAT SERPL-MCNC: 1.41 MG/DL (ref 0.6–1.3)
DIFFERENTIAL METHOD BLD: ABNORMAL
ERYTHROCYTE [DISTWIDTH] IN BLOOD BY AUTOMATED COUNT: 17.9 % (ref 11.5–14.5)
FERRITIN SERPL-MCNC: 28 NG/ML (ref 8–388)
GLOBULIN SER CALC-MCNC: 3.4 G/DL (ref 2–4)
GLUCOSE SERPL-MCNC: 69 MG/DL (ref 74–99)
HCT VFR BLD AUTO: 31.5 % (ref 36–48)
HGB BLD-MCNC: 9.7 G/DL (ref 12–16)
IRON SATN MFR SERPL: 7 %
IRON SERPL-MCNC: 22 UG/DL (ref 50–175)
LYMPHOCYTES # BLD: 1.2 K/UL (ref 1.1–5.9)
LYMPHOCYTES NFR BLD: 27 % (ref 14–44)
MCH RBC QN AUTO: 26.5 PG (ref 25–35)
MCHC RBC AUTO-ENTMCNC: 30.8 G/DL (ref 31–37)
MCV RBC AUTO: 86.1 FL (ref 78–102)
NEUTS SEG # BLD: 2.8 K/UL (ref 1.8–9.5)
NEUTS SEG NFR BLD: 59 % (ref 40–70)
PLATELET # BLD AUTO: 169 K/UL (ref 140–440)
POTASSIUM SERPL-SCNC: 4 MMOL/L (ref 3.5–5.5)
PROT SERPL-MCNC: 6.9 G/DL (ref 6.4–8.2)
RBC # BLD AUTO: 3.66 M/UL (ref 4.1–5.1)
SODIUM SERPL-SCNC: 144 MMOL/L (ref 136–145)
TIBC SERPL-MCNC: 337 UG/DL (ref 250–450)
WBC # BLD AUTO: 4.7 K/UL (ref 4.5–13)

## 2018-10-01 PROCEDURE — 80053 COMPREHEN METABOLIC PANEL: CPT | Performed by: INTERNAL MEDICINE

## 2018-10-01 PROCEDURE — 82728 ASSAY OF FERRITIN: CPT | Performed by: INTERNAL MEDICINE

## 2018-10-01 PROCEDURE — 83540 ASSAY OF IRON: CPT | Performed by: INTERNAL MEDICINE

## 2018-10-01 PROCEDURE — 36415 COLL VENOUS BLD VENIPUNCTURE: CPT | Performed by: INTERNAL MEDICINE

## 2018-10-01 NOTE — MR AVS SNAPSHOT
[facility] 
 
 
 Ruby Pendleton, [address] 200 [facility] 
[phone] Patient: Duane Randolph MRN: [id number] PDM:[id number] Visit Information Date & Time Provider Department Dept. Phone Encounter #  
 10/1/2018  1:15 PM Marti Pickering MD [facility] Oncology [phone] [id number] Follow-up Instructions Return in about 8 weeks (around 11/26/2018). Your Appointments 10/1/2018  1:15 PM  
Office Visit with Marti Pickering MD  
[place] [place] [address] [address] [place] [address]) Appt Note: 8 weeks; 8 weeks Ruby Pendleton, [address] 454 [facility] 3200 [facility], [address] [place] [address] 200 [facility]  
  
    
 12/3/2018 11:00 AM  
Office Visit with Marti Pickering MD  
[place] [address] Oncology [address] [place] [address]) Appt Note: 2 MO RET  
 [facility] [facility], [address] [address] 200 [facility]  
[phone]  
  
    
 1/29/2019  9:30 AM  
Nurse Visit with Silva [facility] ([address] [place] [address]) Appt Note: PSA  
 [address] [facility] [address]  
  
   
 [address]  
  
    
  
 2/12/2019 10:00 AM  
Any with Elena Yan MD  
[facility] ([address] [place] [address]) Appt Note: 1 FLOW PVR UA PSA PRIORS  
 [address] [address] [address] 1097 [facility]  
  
   
 2057 [facility] 2301 [facility] 200 [facility] Upcoming Health Maintenance Date Due DTaP/Tdap/Td series (1 - Tdap) 3/7/1961 Shingrix Vaccine Age 50> (1 of 2) 3/7/1992 GLAUCOMA SCREENING Q2Y 3/7/2007 Pneumococcal 65+ High/Highest Risk (1 of 2 - PCV13) 3/7/2007 MEDICARE YEARLY EXAM 3/14/2018 Influenza Age 5 to Adult 8/1/2018 Allergies as of 10/1/2018  Review Complete On: 10/1/2018 By: Elsy Hines MD  
 No Known Allergies Current Immunizations  Never Reviewed No immunizations on file. Not reviewed this visit You Were Diagnosed With   
  
 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  
 31.34 kg/m 2 2.17 m 2 Preferred Pharmacy Pharmacy Name Phone [address] [facility], [address] Your Updated Medication List  
  
   
This list is accurate as of 10/1/18 11:31 AM.  Always use your most recent med list.  
  
  
  
  
 atorvastatin 40 mg tablet Commonly known as:  LIPITOR  
take 1 tablet by mouth at bedtime for cholesterol  
  
 bumetanide 2 mg tablet Commonly known as:  [facility] CARTIA  mg ER capsule Generic drug:  dilTIAZem CD  
  
 carvedilol 12.5 mg tablet Commonly known as:  [place] Gloss Take  by mouth two (2) times daily (with meals). ELIQUIS 5 mg tablet Generic drug:  apixaban  
  
 fenofibrate micronized 67 mg capsule Commonly known as:  LOFIBRA  
take 1 capsule by mouth once daily  
  
 furosemide 20 mg tablet Commonly known as:  LASIX  
  
 levETIRAcetam 750 mg tablet Commonly known as:  KEPPRA  
take 1 tablet by mouth twice a day  
  
 losartan-hydroCHLOROthiazide 100-25 mg per tablet Commonly known as:  HYZAAR Take 1 Tab by mouth daily. metFORMIN 500 mg tablet Commonly known as:  GLUCOPHAGE  
 Take  by mouth two (2) times daily (with meals). omeprazole 10 mg capsule Commonly known as:  PRILOSEC Take 10 mg by mouth daily. tamsulosin 0.4 mg capsule Commonly known as:  FLOMAX  
take 1 capsule by mouth every evening  
  
 timolol 0.5 % ophthalmic solution Commonly known as:  TIMOPTIC  
1 Drop two (2) times a day. We Performed the Following COMPLETE CBC & AUTO DIFF WBC [77079 CPT(R)] Follow-up Instructions Return in about 8 weeks (around 11/26/2018). To-Do List   
 10/01/2018 Lab:  CBC WITH 3 PART DIFF Patient Instructions Anemia From Chronic Disease: Care Instructions Your Care Instructions Anemia is a low level of red blood cells. Red blood cells carry oxygen from your lungs to the rest of your body. Sometimes when you have a long-term (chronic) disease, such as kidney disease, arthritis, diabetes, cancer, or an infection, your body does not make enough red blood cells. Follow-up care is a key part of your treatment and safety. Be sure to make and go to all appointments, and call your doctor if you are having problems. It's also a good idea to know your test results and keep a list of the medicines you take. How can you care for yourself at home? · Follow your doctor's instructions to treat the chronic condition that is causing the anemia. · Be safe with medicines. Take your medicine to treat your chronic condition exactly as prescribed. Call your doctor if you think you are having a problem with your medicine. · Take your medicine for anemia exactly as prescribed. Call your doctor if you think you are having a problem with your medicine. Medicines to increase the number of red blood cells (such as epoetin or darbepoetin) may be given as an injection. ¨ If you miss a dose, take it as soon as you can, unless it is almost time for your next dose. In that case, get back on your regular schedule and take only one dose. ¨ Do not freeze this medicine. Store it in the refrigerator. Do not shake the bottle before you prepare the shot. · Keep all your appointments for blood tests to check on your hemoglobin levels. When should you call for help? Call 911 anytime you think you may need emergency care. For example, call if: 
  · You passed out (lost consciousness).  
 Call your doctor now or seek immediate medical care if: 
  · You are short of breath.  
  · You are dizzy or light-headed, or you feel like you may faint.  
  · You have new or worse bleeding.  
 Watch closely for changes in your health, and be sure to contact your doctor if: 
  · You feel weaker or more tired than usual.  
  · You do not get better as expected. Where can you learn more? Go to http://nasrin-deejay.info/. Enter E502 in the search box to learn more about \"Anemia From Chronic Disease: Care Instructions. \" Current as of: October 9, 2017 Content Version: 11.7 © 0782-4727 [employer]. Care instructions adapted under license by [employer] (which disclaims liability or warranty for this information). If you have questions about a medical condition or this instruction, always ask your healthcare professional. [address] any warranty or liability for your use of this information. Introducing [facility]! Saqib Loyola introduces Skipjump patient portal. Now you can access parts of your medical record, email your doctor's office, and request medication refills online. 1. In your internet browser, go to [email or web address] 2. Click on the First Time User? Click Here link in the Sign In box. You will see the New Member Sign Up page. 3. Enter your Skipjump Access Code exactly as it appears below. You will not need to use this code after youve completed the sign-up process. If you do not sign up before the expiration date, you must request a new code. · [employer] Access Code: [email or web address] Expires: 12/30/2018 11:29 AM 
 
4. Enter the last four digits of your Social Security Number (xxxx) and Date of Birth (mm/dd/yyyy) as indicated and click Submit. You will be taken to the next sign-up page. 5. Create a [employer] ID. This will be your [employer] login ID and cannot be changed, so think of one that is secure and easy to remember. 6. Create a [employer] password. You can change your password at any time. 7. Enter your Password Reset Question and Answer. This can be used at a later time if you forget your password. 8. Enter your e-mail address. You will receive e-mail notification when new information is available in [address] 9. Click Sign Up. You can now view and download portions of your medical record. 10. Click the Download Summary menu link to download a portable copy of your medical information. If you have questions, please visit the Frequently Asked Questions section of the [employer] website. Remember, [employer] is NOT to be used for urgent needs. For medical emergencies, dial 911. Now available from your iPhone and Android! Please provide this summary of care documentation to your next provider. Your primary care clinician is listed as Efra Bashir. If you have any questions after today's visit, please call [phone].

## 2018-10-01 NOTE — PATIENT INSTRUCTIONS
Anemia From Chronic Disease: Care Instructions  Your Care Instructions    Anemia is a low level of red blood cells. Red blood cells carry oxygen from your lungs to the rest of your body. Sometimes when you have a long-term (chronic) disease, such as kidney disease, arthritis, diabetes, cancer, or an infection, your body does not make enough red blood cells. Follow-up care is a key part of your treatment and safety. Be sure to make and go to all appointments, and call your doctor if you are having problems. It's also a good idea to know your test results and keep a list of the medicines you take. How can you care for yourself at home? · Follow your doctor's instructions to treat the chronic condition that is causing the anemia. · Be safe with medicines. Take your medicine to treat your chronic condition exactly as prescribed. Call your doctor if you think you are having a problem with your medicine. · Take your medicine for anemia exactly as prescribed. Call your doctor if you think you are having a problem with your medicine. Medicines to increase the number of red blood cells (such as epoetin or darbepoetin) may be given as an injection. ¨ If you miss a dose, take it as soon as you can, unless it is almost time for your next dose. In that case, get back on your regular schedule and take only one dose. ¨ Do not freeze this medicine. Store it in the refrigerator. Do not shake the bottle before you prepare the shot. · Keep all your appointments for blood tests to check on your hemoglobin levels. When should you call for help? Call 911 anytime you think you may need emergency care.  For example, call if:    · You passed out (lost consciousness).    Call your doctor now or seek immediate medical care if:    · You are short of breath.     · You are dizzy or light-headed, or you feel like you may faint.     · You have new or worse bleeding.    Watch closely for changes in your health, and be sure to contact your doctor if:    · You feel weaker or more tired than usual.     · You do not get better as expected. Where can you learn more? Go to http://nasrin-deejay.info/. Enter E502 in the search box to learn more about \"Anemia From Chronic Disease: Care Instructions. \"  Current as of: October 9, 2017  Content Version: 11.7  © 1234-6050 [employer]. Care instructions adapted under license by [employer] (which disclaims liability or warranty for this information). If you have questions about a medical condition or this instruction, always ask your healthcare professional. [place] [address] any warranty or liability for your use of this information.

## 2018-10-01 NOTE — PROGRESS NOTES
Hematology/Oncology  Progress Note    Name: G[place]  Date: 10/1/2018  : 1940    PCP: Soni Ferguson MD     Mr. Derek Felipe is a 70 y.o. man who is here for reassessment of his anemia associated with chronic kidney disease. Current therapy: Procrit at a dose of 60,000 units whenever the hemoglobin is below 10 g/dL and hematocrit is below 30%. Subjective:     Mr. Derek Felipe is a 45-year-old man who has a combination of iron deficiency and anemia associated with chronic kidney disease. He is here today for follow-up visit reporting that he has been doing reasonably well. He has not required any Procrit therapy. The patient has no new complaints to report at this time. Past medical history, family history, and social history: these were reviewed and remains unchanged. Past Medical History:   Diagnosis Date    Hypercholesterolemia     Hypertension     Stroke (cerebrum) ([address]      No past surgical history on file. Social History     Social History    Marital status: SINGLE     Spouse name: N/A    Number of children: N/A    Years of education: N/A     Occupational History    Not on file. Social History Main Topics    Smoking status: Never Smoker    Smokeless tobacco: Never Used    Alcohol use No    Drug use: Not on file    Sexual activity: Not on file     Other Topics Concern    Not on file     Social History Narrative     Family History   Problem Relation Age of Onset    Diabetes Mother     Hypertension Mother     Heart Disease Mother     Cancer Father     Heart Disease Brother     Diabetes Brother     Heart Disease Brother      Current Outpatient Prescriptions   Medication Sig Dispense Refill    bumetanide (BUMEX) 2 mg tablet   0    CARTIA  mg ER capsule   0    tamsulosin (FLOMAX) 0.4 mg capsule take 1 capsule by mouth every evening 90 Cap 3    metFORMIN (GLUCOPHAGE) 500 mg tablet Take  by mouth two (2) times daily (with meals).       timolol (TIMOPTIC) 0.5 % ophthalmic solution 1 Drop two (2) times a day.  ELIQUIS 5 mg tablet   0    atorvastatin (LIPITOR) 40 mg tablet take 1 tablet by mouth at bedtime for cholesterol  0    fenofibrate micronized (LOFIBRA) 67 mg capsule take 1 capsule by mouth once daily  0    furosemide (LASIX) 20 mg tablet   0    levETIRAcetam (KEPPRA) 750 mg tablet take 1 tablet by mouth twice a day  0    carvedilol (COREG) 12.5 mg tablet Take  by mouth two (2) times daily (with meals).  losartan-hydrochlorothiazide (HYZAAR) 100-25 mg per tablet Take 1 Tab by mouth daily.  omeprazole (PRILOSEC) 10 mg capsule Take 10 mg by mouth daily. Review of Systems  Constitutional: The patient has no acute distress or discomfort. HEENT: The patient denies recent head trauma, eye pain, blurred vision,  hearing deficit, oropharyngeal mucosal pain or lesions, and the patient denies throat pain or discomfort. Lymphatics: The patient denies palpable peripheral lymphadenopathy. Hematologic: The patient denies having bruising, bleeding, or progressive fatigue. Respiratory: Patient denies having shortness of breath, cough, sputum production, fever, or dyspnea on exertion. Cardiovascular: The patient denies having leg pain, leg swelling, heart palpitations, chest permit, chest pain, or lightheadedness. The patient denies having dyspnea on exertion. Gastrointestinal: The patient denies having nausea, emesis, or diarrhea. The patient denies having any hematemesis or blood in the stool. Genitourinary: Patient denies having urinary urgency, frequency, or dysuria. The patient denies having blood in the urine. Psychological: The patient denies having symptoms of nervousness, anxiety, depression, or thoughts of harming self. Skin: Patient denies having skin rashes, skin, ulcerations, or unexplained itching or pruritus. Musculoskeletal: The patient denies having pain in the joints or bones.       Objective:     Visit Vitals    /62    Pulse (!) 51    Temp 98.4 °F (36.9 °C) (Oral)    Resp 18    Ht 5' 9\" (1.753 m)    Wt 96.3 kg (212 lb 3.2 oz)    BMI 31.34 kg/m2     ECOG PS=0  Physical Exam:   Gen. Appearance: The patient is in no acute distress. Skin: There is no bruise or rash. HEENT: The exam is unremarkable. Neck: Supple without lymphadenopathy or thyromegaly. Lungs: Clear to auscultation and percussion; there are no wheezes or rhonchi. Heart: Regular rate and rhythm; there are no murmurs, gallops, or rubs. Abdomen: Bowel sounds are present and normal.  There is no guarding, tenderness, or hepatosplenomegaly. Extremities: There is no clubbing, cyanosis, or edema. Neurologic: There are no focal neurologic deficits. Lymphatics: There is no palpable peripheral lymphadenopathy. Musculoskeletal: The patient has full range of motion at all joints. There is no evidence of joint deformity or effusions. There is no focal joint tenderness. Psychological/psychiatric: There is no clinical evidence of anxiety, depression, or melancholy. Lab data:      Results for orders placed or performed during the hospital encounter of 10/01/18   CBC WITH 3 PART DIFF     Status: Abnormal   Result Value Ref Range Status    WBC 4.7 4.5 - 13.0 K/uL Final    RBC 3.66 (L) 4.10 - 5.10 M/uL Final    HGB 9.7 (L) 12.0 - 16 g/dL Final    HCT 31.5 (L) 36 - 48 % Final    MCV 86.1 78 - 102 FL Final    MCH 26.5 25.0 - 35.0 PG Final    MCHC 30.8 (L) 31 - 37 g/dL Final    RDW 17.9 (H) 11.5 - 14.5 % Final    PLATELET 572 313 - 201 K/uL Final    NEUTROPHILS 59 40 - 70 % Final    MIXED CELLS 14 0.1 - 17 % Final    LYMPHOCYTES 27 14 - 44 % Final    ABS. NEUTROPHILS 2.8 1.8 - 9.5 K/UL Final    ABS. MIXED CELLS 0.7 0.0 - 2.3 K/uL Final    ABS. LYMPHOCYTES 1.2 1.1 - 5.9 K/UL Final     Comment: Test performed at [place] [address] Location. Results Reviewed by Medical Director. DF AUTOMATED   Final           Assessment:     1.  Anemia, chronic renal failure, stage 3 (moderate)    2. 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      Plan:   Anemia associated with chronic renal failure, stage III: Have explained to the patient that his CBC from today shows that his hemoglobin is now 9.7 g/dL with hematocrit of 31.5 %. The patient will be offered Procrit at a dose of 60,000 units subcutaneous. The patient is being scheduled to receive Procrit therapy whenever his hemoglobin is below 10 g/dL and hematocrit is below 30%. He understands that he does not meet criteria for Procrit at this time because his hematocrit exceeds 30%. Iron deficiency anemia: The ferritin levels from 6/18/2018 was 29 ng/mL, iron was 65 mcg/dL, and his iron saturation was 20%. I have explained to the patient that a follow-up iron profile from 4/18/2018 showed that his ferritin was normal at 69 ng/mL, iron was 30 mcg/dL and his iron saturation was 11%. His iron deficiency remains corrected. I have explained to the patient that his anemia is related solely to his chronic kidney disease. Transfusion of packed red blood cells is not indicated unless his hemoglobin should decline below 7 g/dL. His current hemoglobin is 9 7 g/dL. Follow-up in 2 months  Orders Placed This Encounter    COMPLETE CBC & AUTO DIFF WBC    InHouse CBC ([employer])     Standing Status:   Future     Number of Occurrences:   1     Standing Expiration Date:   06/0/3713    METABOLIC PANEL, COMPREHENSIVE     Standing Status:   Future     Standing Expiration Date:   10/2/2019    IRON PROFILE     Standing Status:   Future     Standing Expiration Date:   10/2/2019    FERRITIN     Standing Status:   Future     Standing Expiration Date:   10/2/2019       Baron Nury MD  10/1/2018      Please note: This document has been produced using voice recognition software. Unrecognized errors in transcription may be present.

## 2018-12-03 ENCOUNTER — HOSPITAL ENCOUNTER (OUTPATIENT)
Dept: ONCOLOGY | Age: 78
Discharge: HOME | End: 2018-12-03

## 2018-12-03 ENCOUNTER — HOSPITAL ENCOUNTER (OUTPATIENT)
Dept: LAB | Age: 78
Discharge: HOME | End: 2018-12-03
Payer: MEDICARE

## 2018-12-03 ENCOUNTER — OFFICE VISIT (OUTPATIENT)
Dept: ONCOLOGY | Age: 78
End: 2018-12-03

## 2018-12-03 VITALS
TEMPERATURE: 98.6 F | DIASTOLIC BLOOD PRESSURE: 78 MMHG | HEART RATE: 64 BPM | SYSTOLIC BLOOD PRESSURE: 170 MMHG | BODY MASS INDEX: 31.25 KG/M2 | RESPIRATION RATE: 16 BRPM | OXYGEN SATURATION: 98 % | HEIGHT: 69 IN | WEIGHT: 211 LBS

## 2018-12-03 DIAGNOSIS — D63.1 ANEMIA, CHRONIC RENAL FAILURE, STAGE 3 (MODERATE) (HCC): ICD-10-CM

## 2018-12-03 DIAGNOSIS — D50.8 IRON DEFICIENCY ANEMIA SECONDARY TO INADEQUATE DIETARY IRON INTAKE: ICD-10-CM

## 2018-12-03 DIAGNOSIS — N18.30 ANEMIA, CHRONIC RENAL FAILURE, STAGE 3 (MODERATE) (HCC): ICD-10-CM

## 2018-12-03 DIAGNOSIS — D63.1 ANEMIA, CHRONIC RENAL FAILURE, STAGE 3 (MODERATE) (HCC): Primary | ICD-10-CM

## 2018-12-03 DIAGNOSIS — N18.30 ANEMIA, CHRONIC RENAL FAILURE, STAGE 3 (MODERATE) (HCC): Primary | ICD-10-CM

## 2018-12-03 LAB
ALBUMIN SERPL-MCNC: 3.6 G/DL (ref 3.4–5)
ALBUMIN/GLOB SERPL: 1.2 {RATIO} (ref 0.8–1.7)
ALP SERPL-CCNC: 93 U/L (ref 45–117)
ALT SERPL-CCNC: 23 U/L (ref 16–61)
ANION GAP SERPL CALC-SCNC: 6 MMOL/L (ref 3–18)
AST SERPL-CCNC: 21 U/L (ref 15–37)
BASO+EOS+MONOS # BLD AUTO: 0.8 K/UL (ref 0–2.3)
BASO+EOS+MONOS # BLD AUTO: 20 % (ref 0.1–17)
BILIRUB SERPL-MCNC: 0.7 MG/DL (ref 0.2–1)
BUN SERPL-MCNC: 24 MG/DL (ref 7–18)
BUN/CREAT SERPL: 17 (ref 12–20)
CALCIUM SERPL-MCNC: 9.1 MG/DL (ref 8.5–10.1)
CHLORIDE SERPL-SCNC: 113 MMOL/L (ref 100–108)
CO2 SERPL-SCNC: 24 MMOL/L (ref 21–32)
CREAT SERPL-MCNC: 1.43 MG/DL (ref 0.6–1.3)
DIFFERENTIAL METHOD BLD: ABNORMAL
ERYTHROCYTE [DISTWIDTH] IN BLOOD BY AUTOMATED COUNT: 19.9 % (ref 11.5–14.5)
FERRITIN SERPL-MCNC: 42 NG/ML (ref 8–388)
GLOBULIN SER CALC-MCNC: 3.1 G/DL (ref 2–4)
GLUCOSE SERPL-MCNC: 99 MG/DL (ref 74–99)
HCT VFR BLD AUTO: 32.6 % (ref 36–48)
HGB BLD-MCNC: 10.3 G/DL (ref 12–16)
IRON SATN MFR SERPL: 30 %
IRON SERPL-MCNC: 99 UG/DL (ref 50–175)
LYMPHOCYTES # BLD: 1.3 K/UL (ref 1.1–5.9)
LYMPHOCYTES NFR BLD: 32 % (ref 14–44)
MCH RBC QN AUTO: 26.6 PG (ref 25–35)
MCHC RBC AUTO-ENTMCNC: 31.6 G/DL (ref 31–37)
MCV RBC AUTO: 84.2 FL (ref 78–102)
NEUTS SEG # BLD: 1.8 K/UL (ref 1.8–9.5)
NEUTS SEG NFR BLD: 48 % (ref 40–70)
PLATELET # BLD AUTO: 146 K/UL (ref 140–440)
POTASSIUM SERPL-SCNC: 4.1 MMOL/L (ref 3.5–5.5)
PROT SERPL-MCNC: 6.7 G/DL (ref 6.4–8.2)
RBC # BLD AUTO: 3.87 M/UL (ref 4.1–5.1)
SODIUM SERPL-SCNC: 143 MMOL/L (ref 136–145)
TIBC SERPL-MCNC: 332 UG/DL (ref 250–450)
WBC # BLD AUTO: 3.9 K/UL (ref 4.5–13)

## 2018-12-03 PROCEDURE — 83540 ASSAY OF IRON: CPT

## 2018-12-03 PROCEDURE — 36415 COLL VENOUS BLD VENIPUNCTURE: CPT

## 2018-12-03 PROCEDURE — 80053 COMPREHEN METABOLIC PANEL: CPT

## 2018-12-03 PROCEDURE — 82728 ASSAY OF FERRITIN: CPT

## 2018-12-03 NOTE — PROGRESS NOTES
Hematology/Oncology  Progress Note Name: Nestor Bachelor Date: 12/3/2018 : 1940 PCP: Jayy Mcadams MD  
 
Mr. Lennox Doom is a 70 y.o. man who is here for reassessment of his anemia associated with chronic kidney disease. Current therapy: Procrit at a dose of 60,000 units whenever the hemoglobin is below 10 g/dL and hematocrit is below 30%. Subjective:  
 
Mr. Lennox Doom is a 79-year-old man who has a combination of iron deficiency and anemia associated with chronic kidney disease. He is here today for follow-up visit reporting that he has been doing reasonably well. He has not required any Procrit therapy. The patient has no new complaints to report at this time. Past medical history, family history, and social history: these were reviewed and remains unchanged. Past Medical History:  
Diagnosis Date  Hypercholesterolemia  Hypertension  Stroke (cerebrum) ([facility] [address] History reviewed. No pertinent surgical history. Social History Socioeconomic History  Marital status: SINGLE Spouse name: Not on file  Number of children: Not on file  Years of education: Not on file  Highest education level: Not on file Social Needs  Financial resource strain: Not on file  Food insecurity - worry: Not on file  Food insecurity - inability: Not on file  Transportation needs - medical: Not on file  Transportation needs - non-medical: Not on file Occupational History  Not on file Tobacco Use  Smoking status: Never Smoker  Smokeless tobacco: Never Used Substance and Sexual Activity  Alcohol use: No  
 Drug use: Not on file  Sexual activity: Not on file Other Topics Concern  Not on file Social History Narrative  Not on file Family History Problem Relation Age of Onset  Diabetes Mother  Hypertension Mother  Heart Disease Mother  Cancer Father  Heart Disease Brother  Diabetes Brother  Heart Disease Brother Current Outpatient Medications Medication Sig Dispense Refill  bumetanide (BUMEX) 2 mg tablet   0  
 CARTIA  mg ER capsule   0  
 tamsulosin (FLOMAX) 0.4 mg capsule take 1 capsule by mouth every evening 90 Cap 3  
 metFORMIN (GLUCOPHAGE) 500 mg tablet Take  by mouth two (2) times daily (with meals).  timolol (TIMOPTIC) 0.5 % ophthalmic solution 1 Drop two (2) times a day.  ELIQUIS 5 mg tablet   0  
 atorvastatin (LIPITOR) 40 mg tablet take 1 tablet by mouth at bedtime for cholesterol  0  
 fenofibrate micronized (LOFIBRA) 67 mg capsule take 1 capsule by mouth once daily  0  
 furosemide (LASIX) 20 mg tablet   0  
 levETIRAcetam (KEPPRA) 750 mg tablet take 1 tablet by mouth twice a day  0  
 carvedilol (COREG) 12.5 mg tablet Take  by mouth two (2) times daily (with meals).  losartan-hydrochlorothiazide (HYZAAR) 100-25 mg per tablet Take 1 Tab by mouth daily.  omeprazole (PRILOSEC) 10 mg capsule Take 10 mg by mouth daily. Review of Systems Constitutional: The patient has no acute distress or discomfort. HEENT: The patient denies recent head trauma, eye pain, blurred vision,  hearing deficit, oropharyngeal mucosal pain or lesions, and the patient denies throat pain or discomfort. Lymphatics: The patient denies palpable peripheral lymphadenopathy. Hematologic: The patient denies having bruising, bleeding, or progressive fatigue. Respiratory: Patient denies having shortness of breath, cough, sputum production, fever, or dyspnea on exertion. Cardiovascular: The patient denies having leg pain, leg swelling, heart palpitations, chest permit, chest pain, or lightheadedness. The patient denies having dyspnea on exertion. Gastrointestinal: The patient denies having nausea, emesis, or diarrhea. The patient denies having any hematemesis or blood in the stool.  
Genitourinary: Patient denies having urinary urgency, frequency, or dysuria. The patient denies having blood in the urine. Psychological: The patient denies having symptoms of nervousness, anxiety, depression, or thoughts of harming self. Skin: Patient denies having skin rashes, skin, ulcerations, or unexplained itching or pruritus. Musculoskeletal: The patient denies having pain in the joints or bones. Objective:  
 
Visit Vitals /78 Pulse 64 Temp 98.6 °F (37 °C) (Oral) Resp 16 Ht 5' 9\" (1.753 m) Wt 95.7 kg (211 lb) SpO2 98% BMI 31.16 kg/m² ECOG PS=0 Physical Exam:  
Gen. Appearance: The patient is in no acute distress. Skin: There is no bruise or rash. HEENT: The exam is unremarkable. Neck: Supple without lymphadenopathy or thyromegaly. Lungs: Clear to auscultation and percussion; there are no wheezes or rhonchi. Heart: Regular rate and rhythm; there are no murmurs, gallops, or rubs. Abdomen: Bowel sounds are present and normal.  There is no guarding, tenderness, or hepatosplenomegaly. Extremities: There is no clubbing, cyanosis, or edema. Neurologic: There are no focal neurologic deficits. Lymphatics: There is no palpable peripheral lymphadenopathy. Musculoskeletal: The patient has full range of motion at all joints. There is no evidence of joint deformity or effusions. There is no focal joint tenderness. Psychological/psychiatric: There is no clinical evidence of anxiety, depression, or melancholy. Lab data: 
   
Results for orders placed or performed during the hospital encounter of 12/03/18 CBC WITH 3 PART DIFF     Status: Abnormal  
Result Value Ref Range Status  WBC 3.9 (L) 4.5 - 13.0 K/uL Final  
 RBC 3.87 (L) 4.10 - 5.10 M/uL Final  
 HGB 10.3 (L) 12.0 - 16 g/dL Final  
 HCT 32.6 (L) 36 - 48 % Final  
 MCV 84.2 78 - 102 FL Final  
 MCH 26.6 25.0 - 35.0 PG Final  
 MCHC 31.6 31 - 37 g/dL Final  
 RDW 19.9 (H) 11.5 - 14.5 % Final  
 PLATELET 950 727 - 751 K/uL Final  
 NEUTROPHILS 48 40 - 70 % Final  
 MIXED CELLS 20 (H) 0.1 - 17 % Final  
 LYMPHOCYTES 32 14 - 44 % Final  
 ABS. NEUTROPHILS 1.8 1.8 - 9.5 K/UL Final  
 ABS. MIXED CELLS 0.8 0.0 - 2.3 K/uL Final  
 ABS. LYMPHOCYTES 1.3 1.1 - 5.9 K/UL Final  
  Comment: Test performed at [place] [address] Location. Results Reviewed by Medical Director. DF AUTOMATED   Final  
 
 
   
Assessment: 1. Anemia, chronic renal failure, stage 3 (moderate) (HCC) 2. 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 Plan: Anemia associated with chronic renal failure, stage III: Have explained to the patient that his CBC from today shows that his hemoglobin is now 10.3 g/dL with hematocrit of 32.6%. The patient will be offered Procrit at a dose of 60,000 units subcutaneous. The patient is being scheduled to receive Procrit therapy whenever his hemoglobin is below 10 g/dL and hematocrit is below 30%. He understands that he does not meet criteria for Procrit at this time because his hematocrit exceeds 30%. Iron deficiency anemia: The ferritin levels from 6/18/2018 was 29 ng/mL, iron was 65 mcg/dL, and his iron saturation was 20%. A follow-up ferritin level from 10/1/2018 was 28 ng/mL. I have recommended that the patient receive Injectafer 2 doses at this time. We will need to correct his ferritin level above 100 in order to continue his Procrit therapy. I have explained to the patient that his anemia is related solely to his chronic kidney disease. Transfusion of packed red blood cells is not indicated unless his hemoglobin should decline below 7 g/dL. His current hemoglobin is 9 7 g/dL. Follow-up in 2 months Orders Placed This Encounter  COMPLETE CBC & AUTO DIFF WBC  InHouse CBC ([facility]) Standing Status:   Future Number of Occurrences:   1 Standing Expiration Date:   12/10/2018  IRON PROFILE Standing Status:   Future Standing Expiration Date:   12/4/2019  METABOLIC PANEL, COMPREHENSIVE Standing Status:   Future Standing Expiration Date:   12/4/2019  FERRITIN Standing Status:   Future Standing Expiration Date:   12/4/2019 Eric Bradley MD 
12/3/2018 Please note: This document has been produced using voice recognition software. Unrecognized errors in transcription may be present.

## 2018-12-03 NOTE — PATIENT INSTRUCTIONS
Anemia From Chronic Disease: Care Instructions Your Care Instructions Anemia is a low level of red blood cells. Red blood cells carry oxygen from your lungs to the rest of your body. Sometimes when you have a long-term (chronic) disease, such as kidney disease, arthritis, diabetes, cancer, or an infection, your body does not make enough red blood cells. Follow-up care is a key part of your treatment and safety. Be sure to make and go to all appointments, and call your doctor if you are having problems. It's also a good idea to know your test results and keep a list of the medicines you take. How can you care for yourself at home? · Follow your doctor's instructions to treat the chronic condition that is causing the anemia. · Be safe with medicines. Take your medicine to treat your chronic condition exactly as prescribed. Call your doctor if you think you are having a problem with your medicine. · Take your medicine for anemia exactly as prescribed. Call your doctor if you think you are having a problem with your medicine. Medicines to increase the number of red blood cells (such as epoetin or darbepoetin) may be given as an injection. ? If you miss a dose, take it as soon as you can, unless it is almost time for your next dose. In that case, get back on your regular schedule and take only one dose. ? Do not freeze this medicine. Store it in the refrigerator. Do not shake the bottle before you prepare the shot. · Keep all your appointments for blood tests to check on your hemoglobin levels. When should you call for help? Call 911 anytime you think you may need emergency care. For example, call if: 
  · You passed out (lost consciousness).  
 Call your doctor now or seek immediate medical care if: 
  · You are short of breath.  
  · You are dizzy or light-headed, or you feel like you may faint.  
  · You have new or worse bleeding.  Watch closely for changes in your health, and be sure to contact your doctor if: 
  · You feel weaker or more tired than usual.  
  · You do not get better as expected. Where can you learn more? Go to http://nasrin-deejay.info/. Enter E502 in the search box to learn more about \"Anemia From Chronic Disease: Care Instructions. \" Current as of: May 7, 2018 Content Version: 11.8 © 3375-2595 [employer]. Care instructions adapted under license by [employer] (which disclaims liability or warranty for this information). If you have questions about a medical condition or this instruction, always ask your healthcare professional. [address] any warranty or liability for your use of this information.

## 2018-12-06 ENCOUNTER — HOSPITAL ENCOUNTER (OUTPATIENT)
Dept: INFUSION THERAPY | Age: 78
Discharge: HOME | End: 2018-12-06
Payer: MEDICARE

## 2018-12-06 VITALS
SYSTOLIC BLOOD PRESSURE: 166 MMHG | TEMPERATURE: 98.2 F | RESPIRATION RATE: 18 BRPM | DIASTOLIC BLOOD PRESSURE: 76 MMHG | OXYGEN SATURATION: 96 % | HEART RATE: 62 BPM

## 2018-12-06 PROCEDURE — 74011250636 HC RX REV CODE- 250/636: Performed by: INTERNAL MEDICINE

## 2018-12-06 PROCEDURE — 96374 THER/PROPH/DIAG INJ IV PUSH: CPT

## 2018-12-06 RX ORDER — SODIUM CHLORIDE 0.9 % (FLUSH) 0.9 %
10-40 SYRINGE (ML) INJECTION AS NEEDED
Status: DISCONTINUED | OUTPATIENT
Start: 2018-12-06 | End: 2018-12-10 | Stop reason: HOSPADM

## 2018-12-06 RX ADMIN — FERRIC CARBOXYMALTOSE INJECTION 750 MG: 50 INJECTION, SOLUTION INTRAVENOUS at 10:34

## 2018-12-06 RX ADMIN — Medication 10 ML: at 10:50

## 2018-12-06 RX ADMIN — Medication 10 ML: at 10:32

## 2018-12-06 NOTE — PROGRESS NOTES
[facility] Progress Note Date: 2018 Name: De Montejo MRN: [id number] : 1940 INJECTAFER 750 MG 
 
Mr. Horace Olmstead arrived to [facility] at [address] [place] [address]. Mr. Horace Olmstead was assessed and education was provided. Carenotes provided and reviewed with patient, as this is his first dose of Injectafer. Mr. Carolina Ferrara vitals were reviewed. Visit Vitals /76 (BP 1 Location: Right arm, BP Patient Position: Sitting) Pulse 62 Temp 98.2 °F (36.8 °C) Resp 18 SpO2 96% 22 g piv placed in right AC x 1 attempt. PIV flushed without resistance and had brisk blood return. Injectafer 750 mg administered via slow IVP over 8 minutes as ordered. PIV flushed with 10 ml NS. Mr. Horace Olmstead tolerated well without complaints, and agreed to stay for a 30 minute observation period. VS remained stable, and PIV removed at end of the observation period. No bleeding or hematoma observed. Site covered with gauze and Coban. Mr. Horace Olmstead was discharged from [place] [address] in stable condition at 1115. He is to return on 18 at 1000 for his next appointment. Allan Alves RN 2018 [address] [facility]

## 2018-12-13 ENCOUNTER — HOSPITAL ENCOUNTER (OUTPATIENT)
Dept: INFUSION THERAPY | Age: 78
Discharge: HOME | End: 2018-12-13
Payer: MEDICARE

## 2018-12-13 VITALS
SYSTOLIC BLOOD PRESSURE: 171 MMHG | HEART RATE: 67 BPM | DIASTOLIC BLOOD PRESSURE: 87 MMHG | TEMPERATURE: 98 F | RESPIRATION RATE: 18 BRPM | OXYGEN SATURATION: 98 %

## 2018-12-13 PROCEDURE — 96374 THER/PROPH/DIAG INJ IV PUSH: CPT

## 2018-12-13 PROCEDURE — 74011250636 HC RX REV CODE- 250/636: Performed by: INTERNAL MEDICINE

## 2018-12-13 RX ORDER — SODIUM CHLORIDE 0.9 % (FLUSH) 0.9 %
10-40 SYRINGE (ML) INJECTION AS NEEDED
Status: DISCONTINUED | OUTPATIENT
Start: 2018-12-13 | End: 2018-12-17 | Stop reason: HOSPADM

## 2018-12-13 RX ADMIN — FERRIC CARBOXYMALTOSE INJECTION 750 MG: 50 INJECTION, SOLUTION INTRAVENOUS at 09:58

## 2018-12-13 RX ADMIN — Medication 10 ML: at 09:55

## 2018-12-13 RX ADMIN — Medication 10 ML: at 10:10

## 2018-12-13 NOTE — PROGRESS NOTES
[facility] Progress Note    Date: 2018    Name: Maria Guadalupe Hassan    MRN: [id number]         : 1940    INJECTAFER 750 MG 2 of 2    Mr. Veronica Escobar arrived to [facility] at 5095 ambulatory. No complaints or concerns voiced. States he feels better after first injectafer and has more energy. Mr. Veronica Escobar was assessed and education was provided. Mr. Deandre Birmingham vitals were reviewed. Visit Vitals  /87 (BP 1 Location: Left arm, BP Patient Position: At rest)   Pulse 67   Temp 98 °F (36.7 °C)   Resp 18   SpO2 98%     24 g piv placed in right AC x 1 attempt. PIV flushed without resistance and had brisk blood return. Injectafer 750 mg administered via slow IVP over 9-10 minutes as ordered. PIV flushed with 10 ml NS. Mr. Veronica Escobar tolerated well without complaints. He declined to stay for observation. PIV removed. No bleeding or hematoma observed. Site covered with gauze and Coban. Patient Vitals for the past 4 hrs:   Temp Pulse Resp BP SpO2   18 1013  67 18 171/87 98 %   18 0951 98 °F (36.7 °C) 63 18 154/79 98 %       Reviewed discharge instructions with patient. He verbalized understanding. He is to resume regular home medications on return home when due    Mr. Veronica Escobar was discharged from [place] [address] in stable condition at 1015.   He has no further appointment with [facility] [place] Avi, RN  2018   1334

## 2019-02-04 ENCOUNTER — HOSPITAL ENCOUNTER (OUTPATIENT)
Dept: LAB | Age: 79
Discharge: HOME | End: 2019-02-04
Payer: MEDICARE

## 2019-02-04 ENCOUNTER — OFFICE VISIT (OUTPATIENT)
Dept: ONCOLOGY | Age: 79
End: 2019-02-04

## 2019-02-04 ENCOUNTER — HOSPITAL ENCOUNTER (OUTPATIENT)
Dept: ONCOLOGY | Age: 79
Discharge: HOME | End: 2019-02-04

## 2019-02-04 VITALS
HEIGHT: 69 IN | OXYGEN SATURATION: 96 % | DIASTOLIC BLOOD PRESSURE: 98 MMHG | TEMPERATURE: 96.8 F | BODY MASS INDEX: 32.88 KG/M2 | WEIGHT: 222 LBS | SYSTOLIC BLOOD PRESSURE: 158 MMHG | RESPIRATION RATE: 18 BRPM | HEART RATE: 77 BPM

## 2019-02-04 DIAGNOSIS — D63.1 ANEMIA, CHRONIC RENAL FAILURE, STAGE 3 (MODERATE) (HCC): ICD-10-CM

## 2019-02-04 DIAGNOSIS — D50.8 IRON DEFICIENCY ANEMIA SECONDARY TO INADEQUATE DIETARY IRON INTAKE: ICD-10-CM

## 2019-02-04 DIAGNOSIS — N18.30 ANEMIA, CHRONIC RENAL FAILURE, STAGE 3 (MODERATE) (HCC): ICD-10-CM

## 2019-02-04 DIAGNOSIS — D72.819 CHRONIC LEUKOPENIA: ICD-10-CM

## 2019-02-04 DIAGNOSIS — D63.1 ANEMIA, CHRONIC RENAL FAILURE, STAGE 3 (MODERATE) (HCC): Primary | ICD-10-CM

## 2019-02-04 DIAGNOSIS — N18.30 ANEMIA, CHRONIC RENAL FAILURE, STAGE 3 (MODERATE) (HCC): Primary | ICD-10-CM

## 2019-02-04 LAB
ALBUMIN SERPL-MCNC: 4 G/DL (ref 3.4–5)
ALBUMIN/GLOB SERPL: 1.4 {RATIO} (ref 0.8–1.7)
ALP SERPL-CCNC: 124 U/L (ref 45–117)
ALT SERPL-CCNC: 25 U/L (ref 16–61)
ANION GAP SERPL CALC-SCNC: 5 MMOL/L (ref 3–18)
AST SERPL-CCNC: 24 U/L (ref 15–37)
BASO+EOS+MONOS # BLD AUTO: 0.7 K/UL (ref 0–2.3)
BASO+EOS+MONOS NFR BLD AUTO: 18 % (ref 0.1–17)
BILIRUB SERPL-MCNC: 0.8 MG/DL (ref 0.2–1)
BUN SERPL-MCNC: 28 MG/DL (ref 7–18)
BUN/CREAT SERPL: 17 (ref 12–20)
CALCIUM SERPL-MCNC: 8.5 MG/DL (ref 8.5–10.1)
CHLORIDE SERPL-SCNC: 110 MMOL/L (ref 100–108)
CO2 SERPL-SCNC: 28 MMOL/L (ref 21–32)
CREAT SERPL-MCNC: 1.65 MG/DL (ref 0.6–1.3)
DIFFERENTIAL METHOD BLD: ABNORMAL
ERYTHROCYTE [DISTWIDTH] IN BLOOD BY AUTOMATED COUNT: 21 % (ref 11.5–14.5)
FERRITIN SERPL-MCNC: 530 NG/ML (ref 8–388)
GLOBULIN SER CALC-MCNC: 2.9 G/DL (ref 2–4)
GLUCOSE SERPL-MCNC: 83 MG/DL (ref 74–99)
HCT VFR BLD AUTO: 36.8 % (ref 36–48)
HGB BLD-MCNC: 11.9 G/DL (ref 12–16)
IRON SATN MFR SERPL: 15 %
IRON SERPL-MCNC: 38 UG/DL (ref 50–175)
LYMPHOCYTES # BLD: 1.2 K/UL (ref 1.1–5.9)
LYMPHOCYTES NFR BLD: 33 % (ref 14–44)
MCH RBC QN AUTO: 29.4 PG (ref 25–35)
MCHC RBC AUTO-ENTMCNC: 32.3 G/DL (ref 31–37)
MCV RBC AUTO: 90.9 FL (ref 78–102)
NEUTS SEG # BLD: 1.7 K/UL (ref 1.8–9.5)
NEUTS SEG NFR BLD: 49 % (ref 40–70)
PLATELET # BLD AUTO: 131 K/UL (ref 140–440)
POTASSIUM SERPL-SCNC: 3.3 MMOL/L (ref 3.5–5.5)
PROT SERPL-MCNC: 6.9 G/DL (ref 6.4–8.2)
RBC # BLD AUTO: 4.05 M/UL (ref 4.1–5.1)
SODIUM SERPL-SCNC: 143 MMOL/L (ref 136–145)
TIBC SERPL-MCNC: 260 UG/DL (ref 250–450)
WBC # BLD AUTO: 3.6 K/UL (ref 4.5–13)

## 2019-02-04 PROCEDURE — 83540 ASSAY OF IRON: CPT

## 2019-02-04 PROCEDURE — 80053 COMPREHEN METABOLIC PANEL: CPT

## 2019-02-04 PROCEDURE — 82728 ASSAY OF FERRITIN: CPT

## 2019-02-04 PROCEDURE — 36415 COLL VENOUS BLD VENIPUNCTURE: CPT

## 2019-02-04 NOTE — PROGRESS NOTES
Hematology/Oncology  Progress Note    Name: Sarwat Mcmahan  Date: 2019  : 1940    PCP: Sara Reddy MD     Mr. Leonila Reinoso is a 70 y.o. man who is here for reassessment of his anemia associated with chronic kidney disease. Current therapy: Procrit at a dose of 60,000 units whenever the hemoglobin is below 10 g/dL and hematocrit is below 30%. Subjective:     Mr. Leonila Reinoso is a 59-year-old man who has a combination of iron deficiency and anemia associated with chronic kidney disease. He is here today for follow-up visit reporting that he has been doing reasonably well. He has not required any Procrit therapy. The patient has no new complaints to report at this time. Past medical history, family history, and social history: these were reviewed and remains unchanged. Past Medical History:   Diagnosis Date    Elevated PSA     Hypercholesterolemia     Hypertension     Stroke (cerebrum) ([facility] [address]      History reviewed. No pertinent surgical history.   Social History     Socioeconomic History    Marital status: SINGLE     Spouse name: Not on file    Number of children: Not on file    Years of education: Not on file    Highest education level: Not on file   Social Needs    Financial resource strain: Not on file    Food insecurity - worry: Not on file    Food insecurity - inability: Not on file    Transportation needs - medical: Not on file   [employer] needs - non-medical: Not on file   Occupational History    Not on file   Tobacco Use    Smoking status: Never Smoker    Smokeless tobacco: Never Used   Substance and Sexual Activity    Alcohol use: No    Drug use: Not on file    Sexual activity: Not on file   Other Topics Concern    Not on file   Social History Narrative    Not on file     Family History   Problem Relation Age of Onset    Diabetes Mother     Hypertension Mother     Heart Disease Mother     Cancer Father     Heart Disease Brother     Diabetes Brother     Heart Disease Brother      Current Outpatient Medications   Medication Sig Dispense Refill    bumetanide (BUMEX) 2 mg tablet Take 2 mg by mouth daily. 0    CARTIA  mg ER capsule   0    tamsulosin (FLOMAX) 0.4 mg capsule take 1 capsule by mouth every evening 90 Cap 3    metFORMIN (GLUCOPHAGE) 500 mg tablet Take  by mouth two (2) times daily (with meals).  timolol (TIMOPTIC) 0.5 % ophthalmic solution 1 Drop two (2) times a day.  ELIQUIS 5 mg tablet   0    atorvastatin (LIPITOR) 40 mg tablet take 1 tablet by mouth at bedtime for cholesterol  0    fenofibrate micronized (LOFIBRA) 67 mg capsule take 1 capsule by mouth once daily  0    furosemide (LASIX) 20 mg tablet   0    levETIRAcetam (KEPPRA) 750 mg tablet take 1 tablet by mouth twice a day  0    carvedilol (COREG) 12.5 mg tablet Take  by mouth two (2) times daily (with meals).  losartan-hydrochlorothiazide (HYZAAR) 100-25 mg per tablet Take 1 Tab by mouth daily.  omeprazole (PRILOSEC) 10 mg capsule Take 10 mg by mouth daily. Review of Systems  Constitutional: The patient has no acute distress or discomfort. HEENT: The patient denies recent head trauma, eye pain, blurred vision,  hearing deficit, oropharyngeal mucosal pain or lesions, and the patient denies throat pain or discomfort. Lymphatics: The patient denies palpable peripheral lymphadenopathy. Hematologic: The patient denies having bruising, bleeding, or progressive fatigue. Respiratory: Patient denies having shortness of breath, cough, sputum production, fever, or dyspnea on exertion. Cardiovascular: The patient denies having leg pain, leg swelling, heart palpitations, chest permit, chest pain, or lightheadedness. The patient denies having dyspnea on exertion. Gastrointestinal: The patient denies having nausea, emesis, or diarrhea. The patient denies having any hematemesis or blood in the stool.   Genitourinary: Patient denies having urinary urgency, frequency, or dysuria. The patient denies having blood in the urine. Psychological: The patient denies having symptoms of nervousness, anxiety, depression, or thoughts of harming self. Skin: Patient denies having skin rashes, skin, ulcerations, or unexplained itching or pruritus. Musculoskeletal: The patient denies having pain in the joints or bones. Objective:     Visit Vitals  BP (!) 158/98   Pulse 77   Temp 96.8 °F (36 °C) (Oral)   Resp 18   Ht 5' 9\" (1.753 m)   Wt 100.7 kg (222 lb)   SpO2 96%   BMI 32.78 kg/m²     ECOG PS=0  Physical Exam:   Gen. Appearance: The patient is in no acute distress. Skin: There is no bruise or rash. HEENT: The exam is unremarkable. Neck: Supple without lymphadenopathy or thyromegaly. Lungs: Clear to auscultation and percussion; there are no wheezes or rhonchi. Heart: Regular rate and rhythm; there are no murmurs, gallops, or rubs. Abdomen: Bowel sounds are present and normal.  There is no guarding, tenderness, or hepatosplenomegaly. Extremities: There is no clubbing, cyanosis, or edema. Neurologic: There are no focal neurologic deficits. Lymphatics: There is no palpable peripheral lymphadenopathy. Musculoskeletal: The patient has full range of motion at all joints. There is no evidence of joint deformity or effusions. There is no focal joint tenderness. Psychological/psychiatric: There is no clinical evidence of anxiety, depression, or melancholy.     Lab data:      Results for orders placed or performed during the hospital encounter of 02/04/19   CBC WITH 3 PART DIFF     Status: Abnormal   Result Value Ref Range Status    WBC 3.6 (L) 4.5 - 13.0 K/uL Final    RBC 4.05 (L) 4.10 - 5.10 M/uL Final    HGB 11.9 (L) 12.0 - 16 g/dL Final    HCT 36.8 36 - 48 % Final    MCV 90.9 78 - 102 FL Final    MCH 29.4 25.0 - 35.0 PG Final    MCHC 32.3 31 - 37 g/dL Final    RDW 21.0 (H) 11.5 - 14.5 % Final    PLATELET 892 (L) 507 - 440 K/uL Final    NEUTROPHILS 49 40 - 70 % Final    MIXED CELLS 18 (H) 0.1 - 17 % Final    LYMPHOCYTES 33 14 - 44 % Final    ABS. NEUTROPHILS 1.7 (L) 1.8 - 9.5 K/UL Final    ABS. MIXED CELLS 0.7 0.0 - 2.3 K/uL Final    ABS. LYMPHOCYTES 1.2 1.1 - 5.9 K/UL Final     Comment: Test performed at [place] [address] Location. Results Reviewed by Medical Director. DF AUTOMATED   Final           Assessment:     1. Anemia, chronic renal failure, stage 3 (moderate) (HCC)    2. 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    3. Chronic leukopenia      Plan:   Anemia associated with chronic renal failure, stage III: Have explained to the patient that his CBC from today shows that his hemoglobin is now 10.3 g/dL with hematocrit of 32.6%. The patient will be offered Procrit at a dose of 60,000 units subcutaneous. The patient is being scheduled to receive Procrit therapy whenever his hemoglobin is below 10 g/dL and hematocrit is below 30%. He understands that he does not meet criteria for Procrit at this time because his hematocrit exceeds 30%. Iron deficiency anemia: The ferritin levels from 6/18/2018 was 29 ng/mL, iron was 65 mcg/dL, and his iron saturation was 20%. A follow-up ferritin level from 10/1/2018 was 28 ng/mL. I have recommended that the patient receive Injectafer 2 doses at this time. We will need to correct his ferritin level above 100 in order to continue his Procrit therapy. I have explained to the patient that his anemia is related solely to his chronic kidney disease. Transfusion of packed red blood cells is not indicated unless his hemoglobin should decline below 7 g/dL. His current hemoglobin is 9 7 g/dL.       Follow-up in 2 months  Orders Placed This Encounter    COMPLETE CBC & AUTO DIFF WBC    InHouse CBC ([facility])     Standing Status:   Future     Number of Occurrences:   1     Standing Expiration Date:   2/52/0811    METABOLIC PANEL, COMPREHENSIVE     Standing Status:   Future     Number of Occurrences:   1     Standing Expiration Date:   2/5/2020    IRON PROFILE     Standing Status:   Future     Number of Occurrences:   1     Standing Expiration Date:   2/5/2020    FERRITIN     Standing Status:   Future     Number of Occurrences:   1     Standing Expiration Date:   2/5/2020       Keyanna Dinero MD  2/4/2019      Please note: This document has been produced using voice recognition software. Unrecognized errors in transcription may be present.

## 2019-02-04 NOTE — PATIENT INSTRUCTIONS
Iron Deficiency Anemia: Care Instructions  Your Care Instructions    Anemia means that you do not have enough red blood cells. Red blood cells carry oxygen around your body. When you have anemia, it can make you pale, weak, and tired. Many things can cause anemia. The most common cause is loss of blood. This can happen if you have heavy menstrual periods. It can also happen if you have bleeding in your stomach or bowel. You can also get anemia if you don't have enough iron in your diet or if it's hard for your body to absorb iron. In some cases, pregnancy causes anemia. That's because a pregnant woman needs more iron. Your doctor may do more tests to find the cause of your anemia. If a disease or other health problem is causing it, your doctor will treat that problem. It's important to follow up with your doctor to make sure that your iron level returns to normal.  Follow-up care is a key part of your treatment and safety. Be sure to make and go to all appointments, and call your doctor if you are having problems. It's also a good idea to know your test results and keep a list of the medicines you take. How can you care for yourself at home? · If your doctor recommended iron pills, take them as directed. ? Try to take the pills on an empty stomach. You can do this about 1 hour before or 2 hours after meals. But you may need to take iron with food to avoid an upset stomach. ? Do not take antacids or drink milk or anything with caffeine within 2 hours of when you take your iron. They can keep your body from absorbing the iron well. ? Vitamin C helps your body absorb iron. You may want to take iron pills with a glass of orange juice or some other food high in vitamin C.  ? Iron pills may cause stomach problems. These include heartburn, nausea, diarrhea, constipation, and cramps. It can help to drink plenty of fluids and include fruits, vegetables, and fiber in your diet.   ? It's normal for iron pills to make your stool a greenish or grayish black. But internal bleeding can also cause dark stool. So it's important to tell your doctor about any color changes. ? Call your doctor if you think you are having a problem with your iron pills. Even after you start to feel better, it will take several months for your body to build up its supply of iron. ? If you miss a pill, don't take a double dose. ? Keep iron pills out of the reach of small children. Too much iron can be very dangerous. · Eat foods with a lot of iron. These include red meat, shellfish, poultry, and eggs. They also include beans, raisins, whole-grain bread, and leafy green vegetables. · Steam your vegetables. This is the best way to prepare them if you want to get as much iron as possible. · Be safe with medicines. Do not take nonsteroidal anti-inflammatory pain relievers unless your doctor tells you to. These include aspirin, naproxen (Aleve), and ibuprofen (Advil, Motrin). · Liquid iron can stain your teeth. But you can mix it with water or juice and drink it with a straw. Then it won't get on your teeth. When should you call for help? Call 911 anytime you think you may need emergency care. For example, call if:    · You passed out (lost consciousness).    Call your doctor now or seek immediate medical care if:    · You are short of breath.     · You are dizzy or light-headed, or you feel like you may faint.     · You have new or worse bleeding.    Watch closely for changes in your health, and be sure to contact your doctor if:    · You feel weaker or more tired than usual.     · You do not get better as expected. Where can you learn more? Go to http://nasrin-deejay.info/. Enter H179 in the search box to learn more about \"Iron Deficiency Anemia: Care Instructions. \"  Current as of: May 6, 2018  Content Version: 11.9  © 8148-6729 [employer].  Care instructions adapted under license by [employer] (which disclaims liability or warranty for this information). If you have questions about a medical condition or this instruction, always ask your healthcare professional. [address] any warranty or liability for your use of this information. Anemia From Chronic Disease: Care Instructions  Your Care Instructions    Anemia is a low level of red blood cells. Red blood cells carry oxygen from your lungs to the rest of your body. Sometimes when you have a long-term (chronic) disease, such as kidney disease, arthritis, diabetes, cancer, or an infection, your body does not make enough red blood cells. Follow-up care is a key part of your treatment and safety. Be sure to make and go to all appointments, and call your doctor if you are having problems. It's also a good idea to know your test results and keep a list of the medicines you take. How can you care for yourself at home? · Follow your doctor's instructions to treat the chronic condition that is causing the anemia. · Be safe with medicines. Take your medicine to treat your chronic condition exactly as prescribed. Call your doctor if you think you are having a problem with your medicine. · Take your medicine for anemia exactly as prescribed. Call your doctor if you think you are having a problem with your medicine. Medicines to increase the number of red blood cells (such as epoetin or darbepoetin) may be given as an injection. ? If you miss a dose, take it as soon as you can, unless it is almost time for your next dose. In that case, get back on your regular schedule and take only one dose. ? Do not freeze this medicine. Store it in the refrigerator. Do not shake the bottle before you prepare the shot. · Keep all your appointments for blood tests to check on your hemoglobin levels. When should you call for help? Call 911 anytime you think you may need emergency care.  For example, call if:    · You passed out (lost consciousness).    Call your doctor now or seek immediate medical care if:    · You are short of breath.     · You are dizzy or light-headed, or you feel like you may faint.     · You have new or worse bleeding.    Watch closely for changes in your health, and be sure to contact your doctor if:    · You feel weaker or more tired than usual.     · You do not get better as expected. Where can you learn more? Go to http://nasrin-deejay.info/. Enter E502 in the search box to learn more about \"Anemia From Chronic Disease: Care Instructions. \"  Current as of: May 6, 2018  Content Version: 11.9  © 1235-2704 [employer]. Care instructions adapted under license by [email or web address] (which disclaims liability or warranty for this information). If you have questions about a medical condition or this instruction, always ask your healthcare professional. [address] any warranty or liability for your use of this information.

## 2019-04-08 ENCOUNTER — HOSPITAL ENCOUNTER (OUTPATIENT)
Dept: LAB | Age: 79
Discharge: HOME | End: 2019-04-08
Payer: MEDICARE

## 2019-04-08 ENCOUNTER — OFFICE VISIT (OUTPATIENT)
Dept: ONCOLOGY | Age: 79
End: 2019-04-08

## 2019-04-08 ENCOUNTER — HOSPITAL ENCOUNTER (OUTPATIENT)
Dept: ONCOLOGY | Age: 79
Discharge: HOME | End: 2019-04-08

## 2019-04-08 VITALS
WEIGHT: 200 LBS | HEIGHT: 69 IN | BODY MASS INDEX: 29.62 KG/M2 | DIASTOLIC BLOOD PRESSURE: 57 MMHG | OXYGEN SATURATION: 98 % | SYSTOLIC BLOOD PRESSURE: 110 MMHG | RESPIRATION RATE: 19 BRPM | HEART RATE: 76 BPM | TEMPERATURE: 98.5 F

## 2019-04-08 DIAGNOSIS — C61 PROSTATE CANCER (HCC): ICD-10-CM

## 2019-04-08 DIAGNOSIS — D50.8 IRON DEFICIENCY ANEMIA SECONDARY TO INADEQUATE DIETARY IRON INTAKE: Primary | ICD-10-CM

## 2019-04-08 DIAGNOSIS — N18.30 ANEMIA, CHRONIC RENAL FAILURE, STAGE 3 (MODERATE) (HCC): ICD-10-CM

## 2019-04-08 DIAGNOSIS — D50.8 IRON DEFICIENCY ANEMIA SECONDARY TO INADEQUATE DIETARY IRON INTAKE: ICD-10-CM

## 2019-04-08 DIAGNOSIS — D72.819 CHRONIC LEUKOPENIA: ICD-10-CM

## 2019-04-08 DIAGNOSIS — D63.1 ANEMIA, CHRONIC RENAL FAILURE, STAGE 3 (MODERATE) (HCC): ICD-10-CM

## 2019-04-08 LAB
ALBUMIN SERPL-MCNC: 3.9 G/DL (ref 3.4–5)
ALBUMIN/GLOB SERPL: 1.3 {RATIO} (ref 0.8–1.7)
ALP SERPL-CCNC: 133 U/L (ref 45–117)
ALT SERPL-CCNC: 20 U/L (ref 16–61)
ANION GAP SERPL CALC-SCNC: 8 MMOL/L (ref 3–18)
AST SERPL-CCNC: 22 U/L (ref 15–37)
BASO+EOS+MONOS # BLD AUTO: 0.6 K/UL (ref 0–2.3)
BASO+EOS+MONOS NFR BLD AUTO: 19 % (ref 0.1–17)
BILIRUB SERPL-MCNC: 0.9 MG/DL (ref 0.2–1)
BUN SERPL-MCNC: 36 MG/DL (ref 7–18)
BUN/CREAT SERPL: 19 (ref 12–20)
CALCIUM SERPL-MCNC: 8.6 MG/DL (ref 8.5–10.1)
CHLORIDE SERPL-SCNC: 106 MMOL/L (ref 100–108)
CO2 SERPL-SCNC: 28 MMOL/L (ref 21–32)
CREAT SERPL-MCNC: 1.93 MG/DL (ref 0.6–1.3)
DIFFERENTIAL METHOD BLD: ABNORMAL
ERYTHROCYTE [DISTWIDTH] IN BLOOD BY AUTOMATED COUNT: 15.9 % (ref 11.5–14.5)
FERRITIN SERPL-MCNC: 525 NG/ML (ref 8–388)
GLOBULIN SER CALC-MCNC: 3 G/DL (ref 2–4)
GLUCOSE SERPL-MCNC: 120 MG/DL (ref 74–99)
HCT VFR BLD AUTO: 36 % (ref 36–48)
HGB BLD-MCNC: 11.9 G/DL (ref 12–16)
IRON SATN MFR SERPL: 30 %
IRON SERPL-MCNC: 80 UG/DL (ref 50–175)
LYMPHOCYTES # BLD: 1.2 K/UL (ref 1.1–5.9)
LYMPHOCYTES NFR BLD: 35 % (ref 14–44)
MCH RBC QN AUTO: 30.7 PG (ref 25–35)
MCHC RBC AUTO-ENTMCNC: 33.1 G/DL (ref 31–37)
MCV RBC AUTO: 92.8 FL (ref 78–102)
NEUTS SEG # BLD: 1.7 K/UL (ref 1.8–9.5)
NEUTS SEG NFR BLD: 46 % (ref 40–70)
PLATELET # BLD AUTO: 116 K/UL (ref 140–440)
POTASSIUM SERPL-SCNC: 3.8 MMOL/L (ref 3.5–5.5)
PROT SERPL-MCNC: 6.9 G/DL (ref 6.4–8.2)
PSA SERPL-MCNC: 7 NG/ML (ref 0–4)
RBC # BLD AUTO: 3.88 M/UL (ref 4.1–5.1)
SODIUM SERPL-SCNC: 142 MMOL/L (ref 136–145)
TIBC SERPL-MCNC: 271 UG/DL (ref 250–450)
WBC # BLD AUTO: 3.5 K/UL (ref 4.5–13)

## 2019-04-08 PROCEDURE — 82728 ASSAY OF FERRITIN: CPT

## 2019-04-08 PROCEDURE — 84153 ASSAY OF PSA TOTAL: CPT

## 2019-04-08 PROCEDURE — 83540 ASSAY OF IRON: CPT

## 2019-04-08 PROCEDURE — 36415 COLL VENOUS BLD VENIPUNCTURE: CPT

## 2019-04-08 PROCEDURE — 80053 COMPREHEN METABOLIC PANEL: CPT

## 2019-04-08 NOTE — PROGRESS NOTES
Hematology/Oncology  Progress Note Name: Mary Galvan Date: 2019 : 1940 PCP: Bakari Fiore MD  
 
Mr. Magda Sharma is a 70 y.o. man who is here for reassessment of his anemia associated with chronic kidney disease. Current therapy: Procrit at a dose of 60,000 units whenever the hemoglobin is below 10 g/dL and hematocrit is below 30%. Subjective:  
 
Mr. Magda Sharma is a 72-year-old man who has a combination of iron deficiency and anemia associated with chronic kidney disease. He is here today for follow-up visit reporting that he has been doing reasonably well. He has not required any Procrit therapy. The patient has a history of prostate cancer and cannot recall when he had his last follow-up with regards to the prostate malignancy. The patient has no new complaints to report at this time. Past medical history, family history, and social history: these were reviewed and remains unchanged. Past Medical History:  
Diagnosis Date  Elevated PSA  Hypercholesterolemia  Hypertension  Stroke (cerebrum) ([facility] [address] History reviewed. No pertinent surgical history. Social History Socioeconomic History  Marital status: SINGLE Spouse name: Not on file  Number of children: Not on file  Years of education: Not on file  Highest education level: Not on file Occupational History  Not on file Social Needs  Financial resource strain: Not on file  Food insecurity:  
  Worry: Not on file Inability: Not on file  Transportation needs:  
  Medical: Not on file Non-medical: Not on file Tobacco Use  Smoking status: Never Smoker  Smokeless tobacco: Never Used Substance and Sexual Activity  Alcohol use: No  
 Drug use: Not on file  Sexual activity: Not on file Lifestyle  Physical activity:  
  Days per week: Not on file Minutes per session: Not on file  Stress: Not on file Relationships  Social connections: Talks on phone: Not on file Gets together: Not on file Attends Worship service: Not on file Active member of club or organization: Not on file Attends meetings of clubs or organizations: Not on file Relationship status: Not on file  Intimate partner violence:  
  Fear of current or ex partner: Not on file Emotionally abused: Not on file Physically abused: Not on file Forced sexual activity: Not on file Other Topics Concern  Not on file Social History Narrative  Not on file Family History Problem Relation Age of Onset  Diabetes Mother  Hypertension Mother  Heart Disease Mother  Cancer Father  Heart Disease Brother  Diabetes Brother  Heart Disease Brother Current Outpatient Medications Medication Sig Dispense Refill  bumetanide (BUMEX) 2 mg tablet Take 2 mg by mouth daily. 0  
 CARTIA  mg ER capsule   0  
 tamsulosin (FLOMAX) 0.4 mg capsule take 1 capsule by mouth every evening 90 Cap 3  
 metFORMIN (GLUCOPHAGE) 500 mg tablet Take  by mouth two (2) times daily (with meals).  timolol (TIMOPTIC) 0.5 % ophthalmic solution 1 Drop two (2) times a day.  ELIQUIS 5 mg tablet   0  
 atorvastatin (LIPITOR) 40 mg tablet take 1 tablet by mouth at bedtime for cholesterol  0  
 fenofibrate micronized (LOFIBRA) 67 mg capsule take 1 capsule by mouth once daily  0  
 furosemide (LASIX) 20 mg tablet   0  
 levETIRAcetam (KEPPRA) 750 mg tablet take 1 tablet by mouth twice a day  0  
 carvedilol (COREG) 12.5 mg tablet Take  by mouth two (2) times daily (with meals).  losartan-hydrochlorothiazide (HYZAAR) 100-25 mg per tablet Take 1 Tab by mouth daily.  omeprazole (PRILOSEC) 10 mg capsule Take 10 mg by mouth daily. Review of Systems Constitutional: The patient has no acute distress or discomfort.  
HEENT: The patient denies recent head trauma, eye pain, blurred vision, hearing deficit, oropharyngeal mucosal pain or lesions, and the patient denies throat pain or discomfort. Lymphatics: The patient denies palpable peripheral lymphadenopathy. Hematologic: The patient denies having bruising, bleeding, or progressive fatigue. Respiratory: Patient denies having shortness of breath, cough, sputum production, fever, or dyspnea on exertion. Cardiovascular: The patient denies having leg pain, leg swelling, heart palpitations, chest permit, chest pain, or lightheadedness. The patient denies having dyspnea on exertion. Gastrointestinal: The patient denies having nausea, emesis, or diarrhea. The patient denies having any hematemesis or blood in the stool. Genitourinary: Patient denies having urinary urgency, frequency, or dysuria. The patient denies having blood in the urine. Psychological: The patient denies having symptoms of nervousness, anxiety, depression, or thoughts of harming self. Skin: Patient denies having skin rashes, skin, ulcerations, or unexplained itching or pruritus. Musculoskeletal: The patient denies having pain in the joints or bones. Objective:  
 
Visit Vitals /57 Pulse 76 Temp 98.5 °F (36.9 °C) (Oral) Resp 19 Ht 5' 9\" (1.753 m) Wt 90.7 kg (200 lb) SpO2 98% BMI 29.53 kg/m² ECOG PS=0 Physical Exam:  
Gen. Appearance: The patient is in no acute distress. Skin: There is no bruise or rash. HEENT: The exam is unremarkable. Neck: Supple without lymphadenopathy or thyromegaly. Lungs: Clear to auscultation and percussion; there are no wheezes or rhonchi. Heart: Regular rate and rhythm; there are no murmurs, gallops, or rubs. Abdomen: Bowel sounds are present and normal.  There is no guarding, tenderness, or hepatosplenomegaly. Extremities: There is no clubbing, cyanosis, or edema. Neurologic: There are no focal neurologic deficits. Lymphatics: There is no palpable peripheral lymphadenopathy.  Musculoskeletal: The patient has full range of motion at all joints. There is no evidence of joint deformity or effusions. There is no focal joint tenderness. Psychological/psychiatric: There is no clinical evidence of anxiety, depression, or melancholy. Lab data: 
   
Results for orders placed or performed during the hospital encounter of 04/08/19 CBC WITH 3 PART DIFF     Status: Abnormal  
Result Value Ref Range Status WBC 3.5 (L) 4.5 - 13.0 K/uL Final  
 RBC 3.88 (L) 4.10 - 5.10 M/uL Final  
 HGB 11.9 (L) 12.0 - 16 g/dL Final  
 HCT 36.0 36 - 48 % Final  
 MCV 92.8 78 - 102 FL Final  
 MCH 30.7 25.0 - 35.0 PG Final  
 MCHC 33.1 31 - 37 g/dL Final  
 RDW 15.9 (H) 11.5 - 14.5 % Final  
 PLATELET 179 (L) 357 - 440 K/uL Final  
 NEUTROPHILS 46 40 - 70 % Final  
 MIXED CELLS 19 (H) 0.1 - 17 % Final  
 LYMPHOCYTES 35 14 - 44 % Final  
 ABS. NEUTROPHILS 1.7 (L) 1.8 - 9.5 K/UL Final  
 ABS. MIXED CELLS 0.6 0.0 - 2.3 K/uL Final  
 ABS. LYMPHOCYTES 1.2 1.1 - 5.9 K/UL Final  
  Comment: Test performed at [address] [place] [address] or [facility]patient [facility]fusion [facility] Location. Reviewed by Medical Director. DF AUTOMATED   Final  
 
 
   
Assessment: 1. 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 2. Anemia, chronic renal failure, stage 3 (moderate) (HCC) 3. Chronic leukopenia 4. Prostate cancer ([facility] [address] Plan: Anemia associated with chronic renal failure, stage III: Have explained to the patient that his CBC from today shows that his hemoglobin is now 11.9 g/dL with hematocrit of 36 %. The patient will be offered Procrit at a dose of 60,000 units subcutaneous. The patient is being scheduled to receive Procrit therapy whenever his hemoglobin is below 10 g/dL and hematocrit is below 30%. He understands that he does not meet criteria for Procrit at this time because his hematocrit exceeds 30%.  
 
Chronic leukopenia: Have explained to the patient that his WBC count remains relatively stable at 3.5. His absolute neutrophil count is 1.7 with an absolute lymphocyte count of 1.2. Therapeutic intervention for chronic leukopenia will only be indicated if his absolute neutrophil count declines below 5. We will continue to monitor at 2-month intervals. Iron deficiency anemia: The ferritin levels from 2/4/2019 was 530 ng/mL, iron was 38 mcg/dL, and his iron saturation was 15 %. The patient had previously received Injectafer prior to his last iron profile and ferritin levels. I have explained to the patient that we have effectively corrected his ferritin level above 100 and his hemoglobin is now approaching the normal range at 11.9 g/dL. Prostate cancer (new problem to me): I have explained to the patient that a review of his lab data from 12/11/2018 showed that his prostate specific antigen was 7.27 ng/mL. At this time I will obtain a follow-up PSA and will also schedule the patient for a bone scan. I am concerned that we may need to start him on antiandrogen therapy due to a rising PSA with his history of prostate cancer. Therefore I will see him back in clinic in 2 weeks to review the bone scan and the current PSA test.  At the time of his follow-up we will make recommendations for either Kavon or Demetrius Ch. Follow-up in 2 weeks. Orders Placed This Encounter  COMPLETE CBC & AUTO DIFF WBC  InHouse CBC ([employer]) Standing Status:   Future Number of Occurrences:   1 Standing Expiration Date:   4/15/2019  METABOLIC PANEL, COMPREHENSIVE Standing Status:   Future Standing Expiration Date:   4/8/2020  FERRITIN Standing Status:   Future Standing Expiration Date:   4/8/2020  
 IRON PROFILE Standing Status:   Future Standing Expiration Date:   4/8/2020  
 PROSTATE SPECIFIC AG Standing Status:   Future Standing Expiration Date:   4/8/2020 Jaime Hunt MD 
4/8/2019 Please note: This document has been produced using voice recognition software. Unrecognized errors in transcription may be present.

## 2019-04-22 ENCOUNTER — OFFICE VISIT (OUTPATIENT)
Dept: ONCOLOGY | Age: 79
End: 2019-04-22

## 2019-04-22 VITALS
SYSTOLIC BLOOD PRESSURE: 115 MMHG | HEIGHT: 69 IN | RESPIRATION RATE: 18 BRPM | WEIGHT: 203 LBS | BODY MASS INDEX: 30.07 KG/M2 | HEART RATE: 73 BPM | TEMPERATURE: 97 F | DIASTOLIC BLOOD PRESSURE: 66 MMHG | OXYGEN SATURATION: 98 %

## 2019-04-22 DIAGNOSIS — C61 PROSTATE CANCER METASTATIC TO BONE (HCC): Primary | ICD-10-CM

## 2019-04-22 DIAGNOSIS — C61 PROSTATE CANCER (HCC): ICD-10-CM

## 2019-04-22 DIAGNOSIS — C79.51 PROSTATE CANCER METASTATIC TO BONE (HCC): Primary | ICD-10-CM

## 2019-04-22 NOTE — PROGRESS NOTES
Hematology/medical oncology progress note    4/22/2019  Sd Negrete  YOB: 1940    PCP: Usama Pearson MD    Diagnosis: Progressive metastatic prostate cancer    Mr. Collin Mendoza is a 68-year-old man who was diagnosed with primary prostate cancer in 2005. He recently had an elevation in his PSA to 7 ng/mL. He was referred for a bone scan which was completed on 4/12/2019. This test showed intense uptake involving the medial aspect of the right clavicle. Also there was increased uptake involving the lower thoracic spine at the level of T9 and T11. There was some concern that he may have a small pathologic fracture. With his history of prostate cancer and progressive PSA elevation we will start the patient on Degarelix at a dose of 240 mg loading dose followed by 80 mg monthly. The patient will be scheduled for an MRI of of the spine, thoracic and lumbar spine. I have answered his questions to his satisfaction regarding the risk and benefit profile of the antiandrogen therapy. The patient has signed a consent form for treatment and we will tentatively plan to begin his treatment next week. I will see him back in clinic in 2-3 weeks to review the results of the MRI. Total time 30 minutes, greater than 50% of the time was in counseling and coordination of care. Dom Camacho MD, [address]

## 2019-04-22 NOTE — PATIENT INSTRUCTIONS
Prostate Cancer: Care Instructions  Your Care Instructions    The prostate gland is a small, walnut-shaped organ that lies just below a man's bladder. It surrounds the urethra, the tube that carries urine from the bladder out of the body through the penis. Prostate cancer is the abnormal growth of cells in the prostate gland. Prostate cancer cells can spread within the prostate, to nearby lymph nodes and other tissues, and to other parts of the body. When the cancer hasn't spread outside the prostate, it is called localized prostate cancer. With localized prostate cancer, your options depend on how likely it is that your cancer will grow. Tests will show if your cancer is likely to grow. · Low-risk cancer isn't likely to grow right away. If your cancer is low-risk, you can choose active surveillance. This means your cancer will be watched closely by your doctor with regular checkups and tests to see if the cancer grows. This choice allows you to delay having surgery or radiation, often for many years. If the cancer grows very slowly, you may never need treatment. · Medium-risk cancer is more likely to grow. Some men with this type of cancer may be able to choose active surveillance. Others may need to choose surgery or radiation. · High-risk cancer is most likely to grow. If you have high-risk cancer, you will likely need to choose surgery or radiation. If your cancer has already spread outside the prostate or to other parts of the body, then you may have other treatments, like chemotherapy or hormone therapy. If you are over age [de-identified] or have other serious health problems, like heart disease, you may choose not to have treatments to cure your cancer. Instead, you can just have treatments to manage your symptoms. This is called watchful waiting. Finding out that you have cancer is scary. You may feel many emotions and may need some help coping. Seek out family, friends, and counselors for support.  You also can do things at home to make yourself feel better while you go through treatment. Call the [facility] ([phone]) or visit its website at [email or web address] [email or web address] for more information. Follow-up care is a key part of your treatment and safety. Be sure to make and go to all appointments, and call your doctor if you are having problems. It's also a good idea to know your test results and keep a list of the medicines you take. How can you care for yourself at home? · Your doctor will talk to you about your treatment options. You may need to learn more about each of them before you can decide which treatment is best for you. · Take your medicines exactly as prescribed. Call your doctor if you think you are having a problem with your medicine. You will get more details on the specific medicines your doctor prescribes. · Eat healthy food. If you do not feel like eating, try to eat food that has protein and extra calories to keep up your strength and prevent weight loss. Drink liquid meal replacements for extra calories and protein. Try to eat your main meal early. · Take steps to control your stress and workload. Learn relaxation techniques. ? Share your feelings. Stress and tension affect our emotions. By expressing your feelings to others, you may be able to understand and cope with them. ? Consider joining a support group. Talking about a problem with your spouse, a good friend, or other people with similar problems is a good way to reduce tension and stress. ? Express yourself through art. Try writing, crafts, dance, or art to relieve stress. Some dance, writing, or art groups may be available just for people who have cancer. ? Be kind to your body and mind. Getting enough sleep, eating a healthy diet, and taking time to do things you enjoy can contribute to an overall feeling of balance in your life and can help reduce stress. ? Get help if you need it.  Discuss your concerns with your doctor or counselor. · Get some physical activity every day, but do not get too tired. Keep doing the hobbies you enjoy as your energy allows. · If you are vomiting or have diarrhea:  ? Drink plenty of fluids (enough so that your urine is light yellow or clear like water) to prevent dehydration. Choose water and other caffeine-free clear liquids. If you have kidney, heart, or liver disease and have to limit fluids, talk with your doctor before you increase the amount of fluids you drink. ? When you are able to eat, try clear soups, mild foods, and liquids until all symptoms are gone for 12 to 48 hours. Jell-O, dry toast, crackers, and cooked cereal are also good choices. · If you have not already done so, prepare a list of advance directives. Advance directives are instructions to your doctor and family members about what kind of care you want if you become unable to speak or express yourself. When should you call for help? Call 911 anytime you think you may need emergency care. For example, call if:    · You passed out (lost consciousness).    Call your doctor now or seek immediate medical care if:    · You have new or worse pain.     · You have new symptoms, such as a cough, belly pain, vomiting, diarrhea, or a rash.     · You have symptoms of a urinary tract infection. For example:  ? You have blood or pus in your urine. ? You have pain in your back just below your rib cage. This is called flank pain. ? You have a fever, chills, or body aches. ? It hurts to urinate. ? You have groin or belly pain.    Watch closely for changes in your health, and be sure to contact your doctor if:    · You have swollen glands in your armpits, groin, or neck.     · You have trouble controlling your urine.     · You do not get better as expected. Where can you learn more? Go to http://nasrin-deejay.info/. Enter V141 in the search box to learn more about \"Prostate Cancer: Care Instructions. \"  Current as of: March 27, 2018  Content Version: 11.9  © 7954-0208 [email or web address]. Care instructions adapted under license by [employer] (which disclaims liability or warranty for this information). If you have questions about a medical condition or this instruction, always ask your healthcare professional. [place] [address] any warranty or liability for your use of this information.

## 2019-04-22 NOTE — PROGRESS NOTES
Hematology/Oncology  Progress Note Name: Teressa Chavez Date: 2019 : 1940 PCP: Jaron Mendoza MD  
 
Mr. Jes Coreas is a 70 y.o. man who is here for reassessment of his anemia associated with chronic kidney disease. Current therapy: Procrit at a dose of 60,000 units whenever the hemoglobin is below 10 g/dL and hematocrit is below 30%. Subjective:  
 
Mr. Jes Coreas is a 70-year-old man who has a combination of iron deficiency and anemia associated with chronic kidney disease. He is here today for follow-up visit reporting that he has been doing reasonably well. He has not required any Procrit therapy. The patient has a history of prostate cancer and cannot recall when he had his last follow-up with regards to the prostate malignancy. The patient has no new complaints to report at this time. Past medical history, family history, and social history: these were reviewed and remains unchanged. Past Medical History:  
Diagnosis Date  Elevated PSA  Hypercholesterolemia  Hypertension  Stroke (cerebrum) ([facility] [address] History reviewed. No pertinent surgical history. Social History Socioeconomic History  Marital status: SINGLE Spouse name: Not on file  Number of children: Not on file  Years of education: Not on file  Highest education level: Not on file Occupational History  Not on file Social Needs  Financial resource strain: Not on file  Food insecurity:  
  Worry: Not on file Inability: Not on file  Transportation needs:  
  Medical: Not on file Non-medical: Not on file Tobacco Use  Smoking status: Never Smoker  Smokeless tobacco: Never Used Substance and Sexual Activity  Alcohol use: No  
 Drug use: Not on file  Sexual activity: Not on file Lifestyle  Physical activity:  
  Days per week: Not on file Minutes per session: Not on file  Stress: Not on file Relationships  Social connections: Talks on phone: Not on file Gets together: Not on file Attends Yazidi service: Not on file Active member of club or organization: Not on file Attends meetings of clubs or organizations: Not on file Relationship status: Not on file  Intimate partner violence:  
  Fear of current or ex partner: Not on file Emotionally abused: Not on file Physically abused: Not on file Forced sexual activity: Not on file Other Topics Concern  Not on file Social History Narrative  Not on file Family History Problem Relation Age of Onset  Diabetes Mother  Hypertension Mother  Heart Disease Mother  Cancer Father  Heart Disease Brother  Diabetes Brother  Heart Disease Brother Current Outpatient Medications Medication Sig Dispense Refill  bumetanide (BUMEX) 2 mg tablet Take 2 mg by mouth daily. 0  
 CARTIA  mg ER capsule   0  
 tamsulosin (FLOMAX) 0.4 mg capsule take 1 capsule by mouth every evening 90 Cap 3  
 metFORMIN (GLUCOPHAGE) 500 mg tablet Take  by mouth two (2) times daily (with meals).  timolol (TIMOPTIC) 0.5 % ophthalmic solution 1 Drop two (2) times a day.  ELIQUIS 5 mg tablet   0  
 atorvastatin (LIPITOR) 40 mg tablet take 1 tablet by mouth at bedtime for cholesterol  0  
 fenofibrate micronized (LOFIBRA) 67 mg capsule take 1 capsule by mouth once daily  0  
 furosemide (LASIX) 20 mg tablet   0  
 levETIRAcetam (KEPPRA) 750 mg tablet take 1 tablet by mouth twice a day  0  
 carvedilol (COREG) 12.5 mg tablet Take  by mouth two (2) times daily (with meals).  losartan-hydrochlorothiazide (HYZAAR) 100-25 mg per tablet Take 1 Tab by mouth daily.  omeprazole (PRILOSEC) 10 mg capsule Take 10 mg by mouth daily. Review of Systems Constitutional: The patient has no acute distress or discomfort.  
HEENT: The patient denies recent head trauma, eye pain, blurred vision, hearing deficit, oropharyngeal mucosal pain or lesions, and the patient denies throat pain or discomfort. Lymphatics: The patient denies palpable peripheral lymphadenopathy. Hematologic: The patient denies having bruising, bleeding, or progressive fatigue. Respiratory: Patient denies having shortness of breath, cough, sputum production, fever, or dyspnea on exertion. Cardiovascular: The patient denies having leg pain, leg swelling, heart palpitations, chest permit, chest pain, or lightheadedness. The patient denies having dyspnea on exertion. Gastrointestinal: The patient denies having nausea, emesis, or diarrhea. The patient denies having any hematemesis or blood in the stool. Genitourinary: Patient denies having urinary urgency, frequency, or dysuria. The patient denies having blood in the urine. Psychological: The patient denies having symptoms of nervousness, anxiety, depression, or thoughts of harming self. Skin: Patient denies having skin rashes, skin, ulcerations, or unexplained itching or pruritus. Musculoskeletal: The patient denies having pain in the joints or bones. Objective:  
 
Visit Vitals /66 Pulse 73 Temp 97 °F (36.1 °C) (Oral) Resp 18 Ht 5' 9\" (1.753 m) Wt 92.1 kg (203 lb) SpO2 98% BMI 29.98 kg/m² ECOG PS=0 Physical Exam:  
Gen. Appearance: The patient is in no acute distress. Skin: There is no bruise or rash. HEENT: The exam is unremarkable. Neck: Supple without lymphadenopathy or thyromegaly. Lungs: Clear to auscultation and percussion; there are no wheezes or rhonchi. Heart: Regular rate and rhythm; there are no murmurs, gallops, or rubs. Abdomen: Bowel sounds are present and normal.  There is no guarding, tenderness, or hepatosplenomegaly. Extremities: There is no clubbing, cyanosis, or edema. Neurologic: There are no focal neurologic deficits. Lymphatics: There is no palpable peripheral lymphadenopathy.  Musculoskeletal: The patient has full range of motion at all joints. There is no evidence of joint deformity or effusions. There is no focal joint tenderness. Psychological/psychiatric: There is no clinical evidence of anxiety, depression, or melancholy. Lab data: 
   
Results for orders placed or performed during the hospital encounter of 04/08/19 CBC WITH 3 PART DIFF     Status: Abnormal  
Result Value Ref Range Status WBC 3.5 (L) 4.5 - 13.0 K/uL Final  
 RBC 3.88 (L) 4.10 - 5.10 M/uL Final  
 HGB 11.9 (L) 12.0 - 16 g/dL Final  
 HCT 36.0 36 - 48 % Final  
 MCV 92.8 78 - 102 FL Final  
 MCH 30.7 25.0 - 35.0 PG Final  
 MCHC 33.1 31 - 37 g/dL Final  
 RDW 15.9 (H) 11.5 - 14.5 % Final  
 PLATELET 244 (L) 308 - 440 K/uL Final  
 NEUTROPHILS 46 40 - 70 % Final  
 MIXED CELLS 19 (H) 0.1 - 17 % Final  
 LYMPHOCYTES 35 14 - 44 % Final  
 ABS. NEUTROPHILS 1.7 (L) 1.8 - 9.5 K/UL Final  
 ABS. MIXED CELLS 0.6 0.0 - 2.3 K/uL Final  
 ABS. LYMPHOCYTES 1.2 1.1 - 5.9 K/UL Final  
  Comment: Test performed at [address] [place] [address] or [facility]patient [facility] Location. Reviewed by Medical Director. DF AUTOMATED   Final  
 
 
   
Assessment:  
 
No diagnosis found. Plan: Anemia associated with chronic renal failure, stage III: Have explained to the patient that his CBC from today shows that his hemoglobin is now 11.9 g/dL with hematocrit of 36 %. The patient will be offered Procrit at a dose of 60,000 units subcutaneous. The patient is being scheduled to receive Procrit therapy whenever his hemoglobin is below 10 g/dL and hematocrit is below 30%. He understands that he does not meet criteria for Procrit at this time because his hematocrit exceeds 30%. Chronic leukopenia: Have explained to the patient that his WBC count remains relatively stable at 3.5. His absolute neutrophil count is 1.7 with an absolute lymphocyte count of 1.2.   Therapeutic intervention for chronic leukopenia will only be indicated if his absolute neutrophil count declines below 5. We will continue to monitor at 2-month intervals. Iron deficiency anemia: The ferritin levels from 2/4/2019 was 530 ng/mL, iron was 38 mcg/dL, and his iron saturation was 15 %. The patient had previously received Injectafer prior to his last iron profile and ferritin levels. I have explained to the patient that we have effectively corrected his ferritin level above 100 and his hemoglobin is now approaching the normal range at 11.9 g/dL. Prostate cancer (new problem to me): I have explained to the patient that a review of his lab data from 12/11/2018 showed that his prostate specific antigen was 7.27 ng/mL. At this time I will obtain a follow-up PSA and will also schedule the patient for a bone scan. I am concerned that we may need to start him on antiandrogen therapy due to a rising PSA with his history of prostate cancer. Therefore I will see him back in clinic in 2 weeks to review the bone scan and the current PSA test.  At the time of his follow-up with his side which form with therapeutic and prevention will be recommended. Follow-up in 2 weeks. No orders of the defined types were placed in this encounter. Sebastián Bro MD 
4/8/2019 Please note: This document has been produced using voice recognition software. Unrecognized errors in transcription may be present.

## 2019-04-24 ENCOUNTER — DOCUMENTATION ONLY (OUTPATIENT)
Dept: ONCOLOGY | Age: 79
End: 2019-04-24

## 2019-04-24 NOTE — PROGRESS NOTES
Called pt and left VM regarding upcoming apt for Firmagon injection, that apt is scheduled for Tuesday 4/30/19 @ 2:00pm

## 2019-04-30 ENCOUNTER — HOSPITAL ENCOUNTER (OUTPATIENT)
Dept: INFUSION THERAPY | Age: 79
Discharge: HOME | End: 2019-04-30
Payer: MEDICARE

## 2019-04-30 VITALS
OXYGEN SATURATION: 98 % | HEART RATE: 63 BPM | SYSTOLIC BLOOD PRESSURE: 111 MMHG | TEMPERATURE: 98.1 F | HEIGHT: 69 IN | BODY MASS INDEX: 30.05 KG/M2 | WEIGHT: 202.9 LBS | DIASTOLIC BLOOD PRESSURE: 73 MMHG | RESPIRATION RATE: 18 BRPM

## 2019-04-30 DIAGNOSIS — C61 PROSTATE CANCER (HCC): Primary | ICD-10-CM

## 2019-04-30 PROCEDURE — 74011250636 HC RX REV CODE- 250/636: Performed by: INTERNAL MEDICINE

## 2019-04-30 PROCEDURE — 96402 CHEMO HORMON ANTINEOPL SQ/IM: CPT

## 2019-04-30 RX ADMIN — DEGARELIX 240 MG: KIT at 14:10

## 2019-05-14 ENCOUNTER — DOCUMENTATION ONLY (OUTPATIENT)
Dept: ONCOLOGY | Age: 79
End: 2019-05-14

## 2019-05-14 NOTE — PROGRESS NOTES
Received a fax from [facility]. They have not been able to reach pt to schedule MRI of lumbar and thoracic spine. I called and left  regarding this matter and provided the phone number of [phone] so he can give them a call to get his scans scheduled.

## 2019-05-20 ENCOUNTER — HOSPITAL ENCOUNTER (OUTPATIENT)
Dept: ONCOLOGY | Age: 79
Discharge: HOME | End: 2019-05-20

## 2019-05-20 ENCOUNTER — HOSPITAL ENCOUNTER (OUTPATIENT)
Dept: LAB | Age: 79
Discharge: HOME | End: 2019-05-20
Payer: MEDICARE

## 2019-05-20 ENCOUNTER — OFFICE VISIT (OUTPATIENT)
Dept: ONCOLOGY | Age: 79
End: 2019-05-20

## 2019-05-20 VITALS
SYSTOLIC BLOOD PRESSURE: 82 MMHG | TEMPERATURE: 97.3 F | WEIGHT: 199.8 LBS | DIASTOLIC BLOOD PRESSURE: 46 MMHG | HEART RATE: 67 BPM | HEIGHT: 69 IN | OXYGEN SATURATION: 97 % | BODY MASS INDEX: 29.59 KG/M2

## 2019-05-20 DIAGNOSIS — C61 PROSTATE CANCER (HCC): ICD-10-CM

## 2019-05-20 DIAGNOSIS — D72.819 CHRONIC LEUKOPENIA: ICD-10-CM

## 2019-05-20 DIAGNOSIS — D50.8 IRON DEFICIENCY ANEMIA SECONDARY TO INADEQUATE DIETARY IRON INTAKE: ICD-10-CM

## 2019-05-20 DIAGNOSIS — N18.30 ANEMIA, CHRONIC RENAL FAILURE, STAGE 3 (MODERATE) (HCC): ICD-10-CM

## 2019-05-20 DIAGNOSIS — D63.1 ANEMIA, CHRONIC RENAL FAILURE, STAGE 3 (MODERATE) (HCC): ICD-10-CM

## 2019-05-20 DIAGNOSIS — C61 PROSTATE CANCER (HCC): Primary | ICD-10-CM

## 2019-05-20 LAB
ALBUMIN SERPL-MCNC: 3.7 G/DL (ref 3.4–5)
ALBUMIN/GLOB SERPL: 1.2 {RATIO} (ref 0.8–1.7)
ALP SERPL-CCNC: 95 U/L (ref 45–117)
ALT SERPL-CCNC: 23 U/L (ref 16–61)
ANION GAP SERPL CALC-SCNC: 10 MMOL/L (ref 3–18)
AST SERPL-CCNC: 21 U/L (ref 15–37)
BASO+EOS+MONOS # BLD AUTO: 0.4 K/UL (ref 0–2.3)
BASO+EOS+MONOS NFR BLD AUTO: 11 % (ref 0.1–17)
BILIRUB SERPL-MCNC: 0.8 MG/DL (ref 0.2–1)
BUN SERPL-MCNC: 32 MG/DL (ref 7–18)
BUN/CREAT SERPL: 16 (ref 12–20)
CALCIUM SERPL-MCNC: 8.5 MG/DL (ref 8.5–10.1)
CHLORIDE SERPL-SCNC: 107 MMOL/L (ref 100–108)
CO2 SERPL-SCNC: 27 MMOL/L (ref 21–32)
CREAT SERPL-MCNC: 2.04 MG/DL (ref 0.6–1.3)
DIFFERENTIAL METHOD BLD: ABNORMAL
ERYTHROCYTE [DISTWIDTH] IN BLOOD BY AUTOMATED COUNT: 15.2 % (ref 11.5–14.5)
FERRITIN SERPL-MCNC: 663 NG/ML (ref 8–388)
GLOBULIN SER CALC-MCNC: 3.1 G/DL (ref 2–4)
GLUCOSE SERPL-MCNC: 114 MG/DL (ref 74–99)
HCT VFR BLD AUTO: 33.4 % (ref 36–48)
HGB BLD-MCNC: 11 G/DL (ref 12–16)
IRON SATN MFR SERPL: 21 %
IRON SERPL-MCNC: 53 UG/DL (ref 50–175)
LYMPHOCYTES # BLD: 1.2 K/UL (ref 1.1–5.9)
LYMPHOCYTES NFR BLD: 31 % (ref 14–44)
MCH RBC QN AUTO: 30.6 PG (ref 25–35)
MCHC RBC AUTO-ENTMCNC: 32.9 G/DL (ref 31–37)
MCV RBC AUTO: 93 FL (ref 78–102)
NEUTS SEG # BLD: 2.2 K/UL (ref 1.8–9.5)
NEUTS SEG NFR BLD: 58 % (ref 40–70)
PLATELET # BLD AUTO: 142 K/UL (ref 140–440)
POTASSIUM SERPL-SCNC: 3.8 MMOL/L (ref 3.5–5.5)
PROT SERPL-MCNC: 6.8 G/DL (ref 6.4–8.2)
PSA SERPL-MCNC: 5.7 NG/ML (ref 0–4)
RBC # BLD AUTO: 3.59 M/UL (ref 4.1–5.1)
SODIUM SERPL-SCNC: 144 MMOL/L (ref 136–145)
TIBC SERPL-MCNC: 252 UG/DL (ref 250–450)
WBC # BLD AUTO: 3.8 K/UL (ref 4.5–13)

## 2019-05-20 PROCEDURE — 84153 ASSAY OF PSA TOTAL: CPT

## 2019-05-20 PROCEDURE — 83540 ASSAY OF IRON: CPT

## 2019-05-20 PROCEDURE — 80053 COMPREHEN METABOLIC PANEL: CPT

## 2019-05-20 PROCEDURE — 82728 ASSAY OF FERRITIN: CPT

## 2019-05-20 NOTE — PATIENT INSTRUCTIONS
Prostate Cancer: Care Instructions Your Care Instructions The prostate gland is a small, walnut-shaped organ that lies just below a man's bladder. It surrounds the urethra, the tube that carries urine from the bladder out of the body through the penis. Prostate cancer is the abnormal growth of cells in the prostate gland. Prostate cancer cells can spread within the prostate, to nearby lymph nodes and other tissues, and to other parts of the body. When the cancer hasn't spread outside the prostate, it is called localized prostate cancer. With localized prostate cancer, your options depend on how likely it is that your cancer will grow. Tests will show if your cancer is likely to grow. · Low-risk cancer isn't likely to grow right away. If your cancer is low-risk, you can choose active surveillance. This means your cancer will be watched closely by your doctor with regular checkups and tests to see if the cancer grows. This choice allows you to delay having surgery or radiation, often for many years. If the cancer grows very slowly, you may never need treatment. · Medium-risk cancer is more likely to grow. Some men with this type of cancer may be able to choose active surveillance. Others may need to choose surgery or radiation. · High-risk cancer is most likely to grow. If you have high-risk cancer, you will likely need to choose surgery or radiation. If your cancer has already spread outside the prostate or to other parts of the body, then you may have other treatments, like chemotherapy or hormone therapy. If you are over age [de-identified] or have other serious health problems, like heart disease, you may choose not to have treatments to cure your cancer. Instead, you can just have treatments to manage your symptoms. This is called watchful waiting. Finding out that you have cancer is scary. You may feel many emotions and may need some help coping.  Seek out family, friends, and counselors for support. You also can do things at home to make yourself feel better while you go through treatment. Call the [facility] ([phone]) or visit its website at [address] [employer] for more information. Follow-up care is a key part of your treatment and safety. Be sure to make and go to all appointments, and call your doctor if you are having problems. It's also a good idea to know your test results and keep a list of the medicines you take. How can you care for yourself at home? · Your doctor will talk to you about your treatment options. You may need to learn more about each of them before you can decide which treatment is best for you. · Take your medicines exactly as prescribed. Call your doctor if you think you are having a problem with your medicine. You will get more details on the specific medicines your doctor prescribes. · Eat healthy food. If you do not feel like eating, try to eat food that has protein and extra calories to keep up your strength and prevent weight loss. Drink liquid meal replacements for extra calories and protein. Try to eat your main meal early. · Take steps to control your stress and workload. Learn relaxation techniques. ? Share your feelings. Stress and tension affect our emotions. By expressing your feelings to others, you may be able to understand and cope with them. ? Consider joining a support group. Talking about a problem with your spouse, a good friend, or other people with similar problems is a good way to reduce tension and stress. ? Express yourself through art. Try writing, crafts, dance, or art to relieve stress. Some dance, writing, or art groups may be available just for people who have cancer. ? Be kind to your body and mind. Getting enough sleep, eating a healthy diet, and taking time to do things you enjoy can contribute to an overall feeling of balance in your life and can help reduce stress. ? Get help if you need it. Discuss your concerns with your doctor or counselor. · Get some physical activity every day, but do not get too tired. Keep doing the hobbies you enjoy as your energy allows. · If you are vomiting or have diarrhea: ? Drink plenty of fluids (enough so that your urine is light yellow or clear like water) to prevent dehydration. Choose water and other caffeine-free clear liquids. If you have kidney, heart, or liver disease and have to limit fluids, talk with your doctor before you increase the amount of fluids you drink. ? When you are able to eat, try clear soups, mild foods, and liquids until all symptoms are gone for 12 to 48 hours. Jell-O, dry toast, crackers, and cooked cereal are also good choices. · If you have not already done so, prepare a list of advance directives. Advance directives are instructions to your doctor and family members about what kind of care you want if you become unable to speak or express yourself. When should you call for help? Call 911 anytime you think you may need emergency care. For example, call if: 
  · You passed out (lost consciousness).  
 Call your doctor now or seek immediate medical care if: 
  · You have new or worse pain.  
  · You have new symptoms, such as a cough, belly pain, vomiting, diarrhea, or a rash.  
  · You have symptoms of a urinary tract infection. For example: ? You have blood or pus in your urine. ? You have pain in your back just below your rib cage. This is called flank pain. ? You have a fever, chills, or body aches. ? It hurts to urinate. ? You have groin or belly pain.  
 Watch closely for changes in your health, and be sure to contact your doctor if: 
  · You have swollen glands in your armpits, groin, or neck.  
  · You have trouble controlling your urine.  
  · You do not get better as expected. Where can you learn more? Go to http://nasrin-deejay.info/. Enter V141 in the search box to learn more about \"Prostate Cancer: Care Instructions. \" Current as of: March 27, 2018 Content Version: 11.9 © 6541-8266 [employer]. Care instructions adapted under license by [employer] (which disclaims liability or warranty for this information). If you have questions about a medical condition or this instruction, always ask your healthcare professional. [address] any warranty or liability for your use of this information. Anemia From Chronic Disease: Care Instructions Your Care Instructions Anemia is a low level of red blood cells. Red blood cells carry oxygen from your lungs to the rest of your body. Sometimes when you have a long-term (chronic) disease, such as kidney disease, arthritis, diabetes, cancer, or an infection, your body does not make enough red blood cells. Follow-up care is a key part of your treatment and safety. Be sure to make and go to all appointments, and call your doctor if you are having problems. It's also a good idea to know your test results and keep a list of the medicines you take. How can you care for yourself at home? · Follow your doctor's instructions to treat the chronic condition that is causing the anemia. · Be safe with medicines. Take your medicine to treat your chronic condition exactly as prescribed. Call your doctor if you think you are having a problem with your medicine. · Take your medicine for anemia exactly as prescribed. Call your doctor if you think you are having a problem with your medicine. Medicines to increase the number of red blood cells (such as epoetin or darbepoetin) may be given as an injection. ? If you miss a dose, take it as soon as you can, unless it is almost time for your next dose. In that case, get back on your regular schedule and take only one dose. ? Do not freeze this medicine. Store it in the refrigerator.  Do not shake the bottle before you prepare the shot. · Keep all your appointments for blood tests to check on your hemoglobin levels. When should you call for help? Call 911 anytime you think you may need emergency care. For example, call if: 
  · You passed out (lost consciousness).  
 Call your doctor now or seek immediate medical care if: 
  · You are short of breath.  
  · You are dizzy or light-headed, or you feel like you may faint.  
  · You have new or worse bleeding.  
 Watch closely for changes in your health, and be sure to contact your doctor if: 
  · You feel weaker or more tired than usual.  
  · You do not get better as expected. Where can you learn more? Go to http://nasrin-deejay.info/. Enter E502 in the search box to learn more about \"Anemia From Chronic Disease: Care Instructions. \" Current as of: May 6, 2018 Content Version: 11.9 © 1939-4049 [employer]. Care instructions adapted under license by [employer] (which disclaims liability or warranty for this information). If you have questions about a medical condition or this instruction, always ask your healthcare professional. [address] any warranty or liability for your use of this information.

## 2019-05-20 NOTE — PROGRESS NOTES
Hematology/Oncology  Progress Note    Name: Jf Hines  Date: 2019  : 1940    PCP: Navya Canales MD     Mr. Blair Moralez is a 70 y.o. man who is here for reassessment of his anemia associated with chronic kidney disease, Prostate Cancer     Current therapy: Procrit at a dose of 60,000 units whenever the hemoglobin is below 10 g/dL and hematocrit is below 30%. Degarelix monthly     Subjective:     Mr. Blair Moralez is a 68-year-old man who has a combination of iron deficiency and anemia associated with chronic kidney disease. He is here today for follow-up visit reporting that he has been doing reasonably well. He has not required any Procrit therapy. He recently had an elevation in his PSA to 7 ng/mL. He was referred for a bone scan which was completed on 2019. This test showed intense uptake involving the medial aspect of the right clavicle. Also there was increased uptake involving the lower thoracic spine at the level of T9 and T11. There was some concern that he may have a small pathologic fracture. With his history of prostate cancer and progressive PSA elevation he was started on Degarelix at a dose of 240 mg loading dose followed by 80 mg monthly. He was also scheduled for an MRI of the spine, thoracic and lumbar spine however that will be done on 2019. He is using a walker for support and mobility. The patient has no new complaints to report at this time. Past medical history, family history, and social history: these were reviewed and remains unchanged. Past Medical History:   Diagnosis Date    Elevated PSA     Hypercholesterolemia     Hypertension     Stroke (cerebrum) ([facility] [address]      History reviewed. No pertinent surgical history.   Social History     Socioeconomic History    Marital status: SINGLE     Spouse name: Not on file    Number of children: Not on file    Years of education: Not on file    Highest education level: Not on file   Occupational History    Not on file   Social Needs    Financial resource strain: Not on file    Food insecurity:     Worry: Not on file     Inability: Not on file    Transportation needs:     Medical: Not on file     Non-medical: Not on file   Tobacco Use    Smoking status: Never Smoker    Smokeless tobacco: Never Used   Substance and Sexual Activity    Alcohol use: No    Drug use: Not on file    Sexual activity: Not on file   Lifestyle    Physical activity:     Days per week: Not on file     Minutes per session: Not on file    Stress: Not on file   Relationships    Social connections:     Talks on phone: Not on file     Gets together: Not on file     Attends [employer] service: Not on file     Active member of club or organization: Not on file     Attends meetings of clubs or organizations: Not on file     Relationship status: Not on file    Intimate partner violence:     Fear of current or ex partner: Not on file     Emotionally abused: Not on file     Physically abused: Not on file     Forced sexual activity: Not on file   Other Topics Concern    Not on file   Social History Narrative    Not on file     Family History   Problem Relation Age of Onset    Diabetes Mother     Hypertension Mother     Heart Disease Mother     Cancer Father     Heart Disease Brother     Diabetes Brother     Heart Disease Brother      Current Outpatient Medications   Medication Sig Dispense Refill    tamsulosin (FLOMAX) 0.4 mg capsule take 1 capsule by mouth every evening 90 Cap 1    bumetanide (BUMEX) 2 mg tablet Take 2 mg by mouth daily. 0    CARTIA  mg ER capsule   0    metFORMIN (GLUCOPHAGE) 500 mg tablet Take  by mouth two (2) times daily (with meals).  timolol (TIMOPTIC) 0.5 % ophthalmic solution 1 Drop two (2) times a day.       ELIQUIS 5 mg tablet   0    atorvastatin (LIPITOR) 40 mg tablet take 1 tablet by mouth at bedtime for cholesterol  0    fenofibrate micronized (LOFIBRA) 67 mg capsule take 1 capsule by mouth once daily  0    furosemide (LASIX) 20 mg tablet   0    levETIRAcetam (KEPPRA) 750 mg tablet take 1 tablet by mouth twice a day  0    carvedilol (COREG) 12.5 mg tablet Take  by mouth two (2) times daily (with meals).  losartan-hydrochlorothiazide (HYZAAR) 100-25 mg per tablet Take 1 Tab by mouth daily.  omeprazole (PRILOSEC) 10 mg capsule Take 10 mg by mouth daily. Review of Systems  Constitutional: The patient has no acute distress or discomfort. HEENT: The patient denies recent head trauma, eye pain, blurred vision,  hearing deficit, oropharyngeal mucosal pain or lesions, and the patient denies throat pain or discomfort. Lymphatics: The patient denies palpable peripheral lymphadenopathy. Hematologic: The patient denies having bruising, bleeding, or progressive fatigue. Respiratory: Patient denies having shortness of breath, cough, sputum production, fever, or dyspnea on exertion. Cardiovascular: The patient denies having leg pain, leg swelling, heart palpitations, chest permit, chest pain, or lightheadedness. The patient denies having dyspnea on exertion. Gastrointestinal: The patient denies having nausea, emesis, or diarrhea. The patient denies having any hematemesis or blood in the stool. Genitourinary: Patient denies having urinary urgency, frequency, or dysuria. The patient denies having blood in the urine. Psychological: The patient denies having symptoms of nervousness, anxiety, depression, or thoughts of harming self. Skin: Patient denies having skin rashes, skin, ulcerations, or unexplained itching or pruritus. Musculoskeletal: The patient denies having pain in the joints or bones. Objective:     Visit Vitals  BP (!) 82/46   Pulse 67   Temp 97.3 °F (36.3 °C)   Ht 5' 9\" (1.753 m)   Wt 90.6 kg (199 lb 12.8 oz)   SpO2 97%   BMI 29.51 kg/m²     ECOG PS=0  Physical Exam:   Gen. Appearance: The patient is in no acute distress. Skin: There is no bruise or rash.   HEENT: The exam is unremarkable. Neck: Supple without lymphadenopathy or thyromegaly. Lungs: Clear to auscultation and percussion; there are no wheezes or rhonchi. Heart: Regular rate and rhythm; there are no murmurs, gallops, or rubs. Abdomen: Bowel sounds are present and normal.  There is no guarding, tenderness, or hepatosplenomegaly. Extremities: There is no clubbing, cyanosis, or edema. Neurologic: There are no focal neurologic deficits. Lymphatics: There is no palpable peripheral lymphadenopathy. Musculoskeletal: The patient has full range of motion at all joints. There is no evidence of joint deformity or effusions. There is no focal joint tenderness. Psychological/psychiatric: There is no clinical evidence of anxiety, depression, or melancholy. Lab data:      Results for orders placed or performed during the hospital encounter of 05/20/19   CBC WITH 3 PART DIFF     Status: Abnormal   Result Value Ref Range Status    WBC 3.8 (L) 4.5 - 13.0 K/uL Final    RBC 3.59 (L) 4.10 - 5.10 M/uL Final    HGB 11.0 (L) 12.0 - 16 g/dL Final    HCT 33.4 (L) 36 - 48 % Final    MCV 93.0 78 - 102 FL Final    MCH 30.6 25.0 - 35.0 PG Final    MCHC 32.9 31 - 37 g/dL Final    RDW 15.2 (H) 11.5 - 14.5 % Final    PLATELET 281 253 - 778 K/uL Final    NEUTROPHILS 58 40 - 70 % Final    MIXED CELLS 11 0.1 - 17 % Final    LYMPHOCYTES 31 14 - 44 % Final    ABS. NEUTROPHILS 2.2 1.8 - 9.5 K/UL Final    ABS. MIXED CELLS 0.4 0.0 - 2.3 K/uL Final    ABS. LYMPHOCYTES 1.2 1.1 - 5.9 K/UL Final     Comment: Test performed at [address] [place] [address] or Outpatient Infusion Center Location. Reviewed by Medical Director. DF AUTOMATED   Final           Assessment:     1. Prostate cancer ([facility] [address]    2. Chronic leukopenia    3. Anemia, chronic renal failure, stage 3 (moderate) (HCC)    4.  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      Plan:   Anemia associated with chronic renal failure, stage III: Have explained to the patient that his CBC from today shows that his hemoglobin is now 11.0 g/dL with hematocrit of 33 %. The patient will be offered Procrit at a dose of 60,000 units subcutaneous. The patient is being scheduled to receive Procrit therapy whenever his hemoglobin is below 10 g/dL and hematocrit is below 30%. He understands that he does not meet criteria for Procrit at this time because his hematocrit exceeds 30%. Chronic leukopenia: Have explained to the patient that his WBC count remains relatively stable at 3.8,   His absolute neutrophil count is 2.2 with an absolute lymphocyte count of 1.2. Therapeutic intervention for chronic leukopenia will only be indicated if his absolute neutrophil count declines below 0.5. We will continue to monitor at 2-month intervals. Iron deficiency anemia: The ferritin levels from 2/4/2019 was 530 ng/mL, iron was 38 mcg/dL, and his iron saturation was 15 %. The patient had previously received Injectafer prior to his last iron profile and ferritin levels. I have explained to the patient that we have effectively corrected his ferritin level above 100 and his hemoglobin is now approaching the normal range at 11.9 g/dL. Prostate cancer (new problem to me): patient lab data from 12/11/2018 showed that his prostate specific antigen was 7.27 ng/mL. A follow-up PSA and and a bone scan was schedule and the result showed intense uptake involving the medial aspect of the right clavicle. Also there was increased uptake involving the lower thoracic spine at the level of T9 and T11. There was some concern that he may have a small pathologic fracture. With his history of prostate cancer and progressive PSA elevation he was started on Degarelix at a dose of 240 mg loading dose followed by 80 mg monthly. He was also scheduled for an MRI of the spine, thoracic and lumbar spine however that will be done on 5/23/2019.  Follow in 3 weeks to review Result for MRI Follow-up in 3 weeks. Orders Placed This Encounter    COMPLETE CBC & AUTO DIFF WBC    InHouse CBC ([facility])     Standing Status:   Future     Number of Occurrences:   1     Standing Expiration Date:   5/27/2019    FERRITIN     Standing Status:   Future     Number of Occurrences:   1     Standing Expiration Date:   5/86/2376    METABOLIC PANEL, COMPREHENSIVE     Standing Status:   Future     Number of Occurrences:   1     Standing Expiration Date:   5/20/2020    IRON PROFILE     Standing Status:   Future     Number of Occurrences:   1     Standing Expiration Date:   5/20/2020    PROSTATE SPECIFIC AG     Standing Status:   Future     Number of Occurrences:   1     Standing Expiration Date:   5/20/2020       Laurie Degroot NP  5/20/2019      Please note: This document has been produced using voice recognition software. Unrecognized errors in transcription may be present.

## 2019-06-17 ENCOUNTER — HOSPITAL ENCOUNTER (OUTPATIENT)
Dept: LAB | Age: 79
Discharge: HOME | End: 2019-06-17
Payer: MEDICARE

## 2019-06-17 ENCOUNTER — OFFICE VISIT (OUTPATIENT)
Dept: ONCOLOGY | Age: 79
End: 2019-06-17

## 2019-06-17 ENCOUNTER — HOSPITAL ENCOUNTER (OUTPATIENT)
Dept: ONCOLOGY | Age: 79
Discharge: HOME | End: 2019-06-17

## 2019-06-17 VITALS
SYSTOLIC BLOOD PRESSURE: 112 MMHG | WEIGHT: 204 LBS | HEART RATE: 65 BPM | OXYGEN SATURATION: 98 % | RESPIRATION RATE: 16 BRPM | DIASTOLIC BLOOD PRESSURE: 67 MMHG | TEMPERATURE: 98.6 F | HEIGHT: 69 IN | BODY MASS INDEX: 30.21 KG/M2

## 2019-06-17 DIAGNOSIS — C61 PROSTATE CANCER (HCC): Primary | ICD-10-CM

## 2019-06-17 DIAGNOSIS — N18.30 ANEMIA, CHRONIC RENAL FAILURE, STAGE 3 (MODERATE) (HCC): ICD-10-CM

## 2019-06-17 DIAGNOSIS — C61 PROSTATE CANCER (HCC): ICD-10-CM

## 2019-06-17 DIAGNOSIS — D63.1 ANEMIA, CHRONIC RENAL FAILURE, STAGE 3 (MODERATE) (HCC): ICD-10-CM

## 2019-06-17 DIAGNOSIS — D72.819 CHRONIC LEUKOPENIA: ICD-10-CM

## 2019-06-17 LAB
ALBUMIN SERPL-MCNC: 3.7 G/DL (ref 3.4–5)
ALBUMIN/GLOB SERPL: 1.3 {RATIO} (ref 0.8–1.7)
ALP SERPL-CCNC: 83 U/L (ref 45–117)
ALT SERPL-CCNC: 26 U/L (ref 16–61)
ANION GAP SERPL CALC-SCNC: 6 MMOL/L (ref 3–18)
AST SERPL-CCNC: 24 U/L (ref 15–37)
BASO+EOS+MONOS # BLD AUTO: 0.6 K/UL (ref 0–2.3)
BASO+EOS+MONOS NFR BLD AUTO: 14 % (ref 0.1–17)
BILIRUB SERPL-MCNC: 0.6 MG/DL (ref 0.2–1)
BUN SERPL-MCNC: 29 MG/DL (ref 7–18)
BUN/CREAT SERPL: 16 (ref 12–20)
CALCIUM SERPL-MCNC: 8.6 MG/DL (ref 8.5–10.1)
CHLORIDE SERPL-SCNC: 109 MMOL/L (ref 100–108)
CO2 SERPL-SCNC: 28 MMOL/L (ref 21–32)
CREAT SERPL-MCNC: 1.86 MG/DL (ref 0.6–1.3)
DIFFERENTIAL METHOD BLD: ABNORMAL
ERYTHROCYTE [DISTWIDTH] IN BLOOD BY AUTOMATED COUNT: 15.2 % (ref 11.5–14.5)
FERRITIN SERPL-MCNC: 628 NG/ML (ref 8–388)
GLOBULIN SER CALC-MCNC: 2.8 G/DL (ref 2–4)
GLUCOSE SERPL-MCNC: 122 MG/DL (ref 74–99)
HCT VFR BLD AUTO: 31.9 % (ref 36–48)
HGB BLD-MCNC: 10.5 G/DL (ref 12–16)
IRON SATN MFR SERPL: 19 %
IRON SERPL-MCNC: 47 UG/DL (ref 50–175)
LYMPHOCYTES # BLD: 1.3 K/UL (ref 1.1–5.9)
LYMPHOCYTES NFR BLD: 28 % (ref 14–44)
MCH RBC QN AUTO: 31.3 PG (ref 25–35)
MCHC RBC AUTO-ENTMCNC: 32.9 G/DL (ref 31–37)
MCV RBC AUTO: 95.2 FL (ref 78–102)
NEUTS SEG # BLD: 2.6 K/UL (ref 1.8–9.5)
NEUTS SEG NFR BLD: 58 % (ref 40–70)
PLATELET # BLD AUTO: 124 K/UL (ref 140–440)
POTASSIUM SERPL-SCNC: 3.7 MMOL/L (ref 3.5–5.5)
PROT SERPL-MCNC: 6.5 G/DL (ref 6.4–8.2)
PSA SERPL-MCNC: 3.7 NG/ML (ref 0–4)
RBC # BLD AUTO: 3.35 M/UL (ref 4.1–5.1)
SODIUM SERPL-SCNC: 143 MMOL/L (ref 136–145)
TIBC SERPL-MCNC: 250 UG/DL (ref 250–450)
WBC # BLD AUTO: 4.5 K/UL (ref 4.5–13)

## 2019-06-17 PROCEDURE — 83540 ASSAY OF IRON: CPT

## 2019-06-17 PROCEDURE — 82728 ASSAY OF FERRITIN: CPT

## 2019-06-17 PROCEDURE — 80053 COMPREHEN METABOLIC PANEL: CPT

## 2019-06-17 PROCEDURE — 84153 ASSAY OF PSA TOTAL: CPT

## 2019-06-17 PROCEDURE — 36415 COLL VENOUS BLD VENIPUNCTURE: CPT

## 2019-06-17 RX ORDER — OXYCODONE AND ACETAMINOPHEN 5; 325 MG/1; MG/1
1 TABLET ORAL
Qty: 60 TAB | Refills: 0 | Status: SHIPPED | OUTPATIENT
Start: 2019-06-17 | End: 2019-07-17

## 2019-06-17 NOTE — PATIENT INSTRUCTIONS
Prostate Cancer: Care Instructions  Your Care Instructions    The prostate gland is a small, walnut-shaped organ that lies just below a man's bladder. It surrounds the urethra, the tube that carries urine from the bladder out of the body through the penis. Prostate cancer is the abnormal growth of cells in the prostate gland. Prostate cancer cells can spread within the prostate, to nearby lymph nodes and other tissues, and to other parts of the body. When the cancer hasn't spread outside the prostate, it is called localized prostate cancer. With localized prostate cancer, your options depend on how likely it is that your cancer will grow. Tests will show if your cancer is likely to grow. · Low-risk cancer isn't likely to grow right away. If your cancer is low-risk, you can choose active surveillance. This means your cancer will be watched closely by your doctor with regular checkups and tests to see if the cancer grows. This choice allows you to delay having surgery or radiation, often for many years. If the cancer grows very slowly, you may never need treatment. · Medium-risk cancer is more likely to grow. Some men with this type of cancer may be able to choose active surveillance. Others may need to choose surgery or radiation. · High-risk cancer is most likely to grow. If you have high-risk cancer, you will likely need to choose surgery or radiation. If your cancer has already spread outside the prostate or to other parts of the body, then you may have other treatments, like chemotherapy or hormone therapy. If you are over age [de-identified] or have other serious health problems, like heart disease, you may choose not to have treatments to cure your cancer. Instead, you can just have treatments to manage your symptoms. This is called watchful waiting. Finding out that you have cancer is scary. You may feel many emotions and may need some help coping. Seek out family, friends, and counselors for support.  You also can do things at home to make yourself feel better while you go through treatment. Call the [facility] ([phone]) or visit its website at [address] [employer] for more information. Follow-up care is a key part of your treatment and safety. Be sure to make and go to all appointments, and call your doctor if you are having problems. It's also a good idea to know your test results and keep a list of the medicines you take. How can you care for yourself at home? · Your doctor will talk to you about your treatment options. You may need to learn more about each of them before you can decide which treatment is best for you. · Take your medicines exactly as prescribed. Call your doctor if you think you are having a problem with your medicine. You will get more details on the specific medicines your doctor prescribes. · Eat healthy food. If you do not feel like eating, try to eat food that has protein and extra calories to keep up your strength and prevent weight loss. Drink liquid meal replacements for extra calories and protein. Try to eat your main meal early. · Take steps to control your stress and workload. Learn relaxation techniques. ? Share your feelings. Stress and tension affect our emotions. By expressing your feelings to others, you may be able to understand and cope with them. ? Consider joining a support group. Talking about a problem with your spouse, a good friend, or other people with similar problems is a good way to reduce tension and stress. ? Express yourself through art. Try writing, crafts, dance, or art to relieve stress. Some dance, writing, or art groups may be available just for people who have cancer. ? Be kind to your body and mind. Getting enough sleep, eating a healthy diet, and taking time to do things you enjoy can contribute to an overall feeling of balance in your life and can help reduce stress. ? Get help if you need it.  Discuss your concerns with your doctor or counselor. · Get some physical activity every day, but do not get too tired. Keep doing the hobbies you enjoy as your energy allows. · If you are vomiting or have diarrhea:  ? Drink plenty of fluids (enough so that your urine is light yellow or clear like water) to prevent dehydration. Choose water and other caffeine-free clear liquids. If you have kidney, heart, or liver disease and have to limit fluids, talk with your doctor before you increase the amount of fluids you drink. ? When you are able to eat, try clear soups, mild foods, and liquids until all symptoms are gone for 12 to 48 hours. Jell-O, dry toast, crackers, and cooked cereal are also good choices. · If you have not already done so, prepare a list of advance directives. Advance directives are instructions to your doctor and family members about what kind of care you want if you become unable to speak or express yourself. When should you call for help? Call 911 anytime you think you may need emergency care. For example, call if:    · You passed out (lost consciousness).    Call your doctor now or seek immediate medical care if:    · You have new or worse pain.     · You have new symptoms, such as a cough, belly pain, vomiting, diarrhea, or a rash.     · You have symptoms of a urinary tract infection. For example:  ? You have blood or pus in your urine. ? You have pain in your back just below your rib cage. This is called flank pain. ? You have a fever, chills, or body aches. ? It hurts to urinate. ? You have groin or belly pain.    Watch closely for changes in your health, and be sure to contact your doctor if:    · You have swollen glands in your armpits, groin, or neck.     · You have trouble controlling your urine.     · You do not get better as expected. Where can you learn more? Go to http://nasrin-deejay.info/. Enter V141 in the search box to learn more about \"Prostate Cancer: Care Instructions. \"  Current as of: March 27, 2018  Content Version: 11.9  © 2006-2018 [employer]. Care instructions adapted under license by [employer] (which disclaims liability or warranty for this information). If you have questions about a medical condition or this instruction, always ask your healthcare professional. [address] any warranty or liability for your use of this information. Anemia From Chronic Disease: Care Instructions  Your Care Instructions    Anemia is a low level of red blood cells. Red blood cells carry oxygen from your lungs to the rest of your body. Sometimes when you have a long-term (chronic) disease, such as kidney disease, arthritis, diabetes, cancer, or an infection, your body does not make enough red blood cells. Follow-up care is a key part of your treatment and safety. Be sure to make and go to all appointments, and call your doctor if you are having problems. It's also a good idea to know your test results and keep a list of the medicines you take. How can you care for yourself at home? · Follow your doctor's instructions to treat the chronic condition that is causing the anemia. · Be safe with medicines. Take your medicine to treat your chronic condition exactly as prescribed. Call your doctor if you think you are having a problem with your medicine. · Take your medicine for anemia exactly as prescribed. Call your doctor if you think you are having a problem with your medicine. Medicines to increase the number of red blood cells (such as epoetin or darbepoetin) may be given as an injection. ? If you miss a dose, take it as soon as you can, unless it is almost time for your next dose. In that case, get back on your regular schedule and take only one dose. ? Do not freeze this medicine. Store it in the refrigerator. Do not shake the bottle before you prepare the shot. · Keep all your appointments for blood tests to check on your hemoglobin levels.   When should you call for help? Call 911 anytime you think you may need emergency care. For example, call if:    · You passed out (lost consciousness).    Call your doctor now or seek immediate medical care if:    · You are short of breath.     · You are dizzy or light-headed, or you feel like you may faint.     · You have new or worse bleeding.    Watch closely for changes in your health, and be sure to contact your doctor if:    · You feel weaker or more tired than usual.     · You do not get better as expected. Where can you learn more? Go to http://nasrin-deejay.info/. Enter E502 in the search box to learn more about \"Anemia From Chronic Disease: Care Instructions. \"  Current as of: May 6, 2018  Content Version: 11.9  © 5183-3466 [employer]. Care instructions adapted under license by [employer] (which disclaims liability or warranty for this information). If you have questions about a medical condition or this instruction, always ask your healthcare professional. [address] any warranty or liability for your use of this information.

## 2019-06-17 NOTE — PROGRESS NOTES
Hematology/Oncology  Progress Note    Name: Mary Galvan  Date:2019  : 1940    PCP: Bakari Fiore MD     Mr. Magda Sharma is a 70 y.o. man who is here for reassessment of his anemia associated with chronic kidney disease, Prostate Cancer     Current therapy: Procrit at a dose of 60,000 units whenever the hemoglobin is below 10 g/dL and hematocrit is below 30%. Degarelix monthly     Subjective:     Mr. Magda Sharma is a 27-year-old man who has a combination of iron deficiency and anemia associated with chronic kidney disease. He is here today for follow-up visit reporting that he has been doing reasonably well. He has not required any Procrit therapy. He recently had an elevation in his PSA to 7 ng/mL. He was referred for a bone scan which was completed on 2019. This test showed intense uptake involving the medial aspect of the right clavicle. Also there was increased uptake involving the lower thoracic spine at the level of T9 and T11. There was some concern that he may have a small pathologic fracture. With his history of prostate cancer and progressive PSA elevation he was started on Degarelix at a dose of 240 mg loading dose followed by 80 mg monthly. He was also scheduled for an MRI of the spine, thoracic and lumbar spine. He is using a walker for support and mobility. The patient report generalize body pain 5/10 at night. He is requesting for something for pain. He does not have any new complaints to report at this time. Past medical history, family history, and social history: these were reviewed and remains unchanged. Past Medical History:   Diagnosis Date    Elevated PSA     Hypercholesterolemia     Hypertension     Stroke (cerebrum) ([facility] [address]      History reviewed. No pertinent surgical history.   Social History     Socioeconomic History    Marital status: SINGLE     Spouse name: Not on file    Number of children: Not on file    Years of education: Not on file    Highest education level: Not on file   Occupational History    Not on file   Social Needs    Financial resource strain: Not on file    Food insecurity:     Worry: Not on file     Inability: Not on file    Transportation needs:     Medical: Not on file     Non-medical: Not on file   Tobacco Use    Smoking status: Never Smoker    Smokeless tobacco: Never Used   Substance and Sexual Activity    Alcohol use: No    Drug use: Not on file    Sexual activity: Not on file   Lifestyle    Physical activity:     Days per week: Not on file     Minutes per session: Not on file    Stress: Not on file   Relationships    Social connections:     Talks on phone: Not on file     Gets together: Not on file     Attends Restoration service: Not on file     Active member of club or organization: Not on file     Attends meetings of clubs or organizations: Not on file     Relationship status: Not on file    Intimate partner violence:     Fear of current or ex partner: Not on file     Emotionally abused: Not on file     Physically abused: Not on file     Forced sexual activity: Not on file   Other Topics Concern    Not on file   Social History Narrative    Not on file     Family History   Problem Relation Age of Onset    Diabetes Mother     Hypertension Mother     Heart Disease Mother     Cancer Father     Heart Disease Brother     Diabetes Brother     Heart Disease Brother      Current Outpatient Medications   Medication Sig Dispense Refill    oxyCODONE-acetaminophen (PERCOCET) 5-325 mg per tablet Take 1 Tab by mouth every eight (8) hours as needed for Pain for up to 30 days. Max Daily Amount: 3 Tabs. 60 Tab 0    tamsulosin (FLOMAX) 0.4 mg capsule take 1 capsule by mouth every evening 90 Cap 1    bumetanide (BUMEX) 2 mg tablet Take 2 mg by mouth daily. 0    CARTIA  mg ER capsule   0    metFORMIN (GLUCOPHAGE) 500 mg tablet Take  by mouth two (2) times daily (with meals).       timolol (TIMOPTIC) 0.5 % ophthalmic solution 1 Drop two (2) times a day.  ELIQUIS 5 mg tablet   0    atorvastatin (LIPITOR) 40 mg tablet take 1 tablet by mouth at bedtime for cholesterol  0    fenofibrate micronized (LOFIBRA) 67 mg capsule take 1 capsule by mouth once daily  0    furosemide (LASIX) 20 mg tablet   0    levETIRAcetam (KEPPRA) 750 mg tablet take 1 tablet by mouth twice a day  0    carvedilol (COREG) 12.5 mg tablet Take  by mouth two (2) times daily (with meals).  losartan-hydrochlorothiazide (HYZAAR) 100-25 mg per tablet Take 1 Tab by mouth daily.  omeprazole (PRILOSEC) 10 mg capsule Take 10 mg by mouth daily. Review of Systems  Constitutional: The patient has no acute distress or discomfort. HEENT: The patient denies recent head trauma, eye pain, blurred vision,  hearing deficit, oropharyngeal mucosal pain or lesions, and the patient denies throat pain or discomfort. Lymphatics: The patient denies palpable peripheral lymphadenopathy. Hematologic: The patient denies having bruising, bleeding, or progressive fatigue. Respiratory: Patient denies having shortness of breath, cough, sputum production, fever, or dyspnea on exertion. Cardiovascular: The patient denies having leg pain, leg swelling, heart palpitations, chest permit, chest pain, or lightheadedness. The patient denies having dyspnea on exertion. Gastrointestinal: The patient denies having nausea, emesis, or diarrhea. The patient denies having any hematemesis or blood in the stool. Genitourinary: Patient denies having urinary urgency, frequency, or dysuria. The patient denies having blood in the urine. Psychological: The patient denies having symptoms of nervousness, anxiety, depression, or thoughts of harming self. Skin: Patient denies having skin rashes, skin, ulcerations, or unexplained itching or pruritus. Musculoskeletal: The patient denies having pain in the joints or bones.       Objective:     Visit Vitals  /67   Pulse 65   Temp 98.6 °F (37 °C) (Oral)   Resp 16   Ht 5' 9\" (1.753 m)   Wt 92.5 kg (204 lb)   SpO2 98%   BMI 30.13 kg/m²     ECOG PS=0, pain 5/10  Physical Exam:   Gen. Appearance: The patient is in no acute distress. Skin: There is no bruise or rash. HEENT: The exam is unremarkable. Neck: Supple without lymphadenopathy or thyromegaly. Lungs: Clear to auscultation and percussion; there are no wheezes or rhonchi. Heart: Regular rate and rhythm; there are no murmurs, gallops, or rubs. Abdomen: Bowel sounds are present and normal.  There is no guarding, tenderness, or hepatosplenomegaly. Extremities: There is no clubbing, cyanosis, or edema. Neurologic: There are no focal neurologic deficits. Lymphatics: There is no palpable peripheral lymphadenopathy. Musculoskeletal: The patient has full range of motion at all joints. There is no evidence of joint deformity or effusions. There is no focal joint tenderness. Psychological/psychiatric: There is no clinical evidence of anxiety, depression, or melancholy. Lab data:      Results for orders placed or performed during the hospital encounter of 06/17/19   CBC WITH 3 PART DIFF     Status: Abnormal   Result Value Ref Range Status    WBC 4.5 4.5 - 13.0 K/uL Final    RBC 3.35 (L) 4.10 - 5.10 M/uL Final    HGB 10.5 (L) 12.0 - 16 g/dL Final    HCT 31.9 (L) 36 - 48 % Final    MCV 95.2 78 - 102 FL Final    MCH 31.3 25.0 - 35.0 PG Final    MCHC 32.9 31 - 37 g/dL Final    RDW 15.2 (H) 11.5 - 14.5 % Final    PLATELET 753 (L) 450 - 440 K/uL Final    NEUTROPHILS 58 40 - 70 % Final    MIXED CELLS 14 0.1 - 17 % Final    LYMPHOCYTES 28 14 - 44 % Final    ABS. NEUTROPHILS 2.6 1.8 - 9.5 K/UL Final    ABS. MIXED CELLS 0.6 0.0 - 2.3 K/uL Final    ABS. LYMPHOCYTES 1.3 1.1 - 5.9 K/UL Final     Comment: Test performed at [address] [place] [address] or Outpatient [facility]fusion [facility] Location. Reviewed by Medical Director. DF AUTOMATED   Final           Assessment:     1. Prostate cancer ([facility] [address]    2. Chronic leukopenia    3. Anemia, chronic renal failure, stage 3 (moderate) ([facility])      Plan:   Anemia associated with chronic renal failure, stage III: Have explained to the patient that his CBC from today shows that his hemoglobin is now 10.5 g/dL with hematocrit of 31.9%. The patient will be offered Procrit at a dose of 60,000 units subcutaneous. The patient is being scheduled to receive Procrit therapy whenever his hemoglobin is below 10 g/dL and hematocrit is below 30%. He understands that he does not meet criteria for Procrit at this time because his hematocrit exceeds 30%. Chronic leukopenia: Have explained to the patient that his WBC count remains relatively stable at 4.5,   His absolute neutrophil count is 2.6 with an absolute lymphocyte count of 1.3. Therapeutic intervention for chronic leukopenia will only be indicated if his absolute neutrophil count declines below 0.5. We will continue to monitor at 2-month intervals. Prostate cancer: patient lab data from 12/11/2018 showed that his prostate specific antigen was 7.27 ng/mL. A follow-up PSA and and a bone scan was schedule and the result showed intense uptake involving the medial aspect of the right clavicle. Also there was increased uptake involving the lower thoracic spine at the level of T9 and T11. There was some concern that he may have a small pathologic fracture. With his history of prostate cancer and progressive PSA elevation he was started on Degarelix at a dose of 240 mg loading dose followed by 80 mg monthly. He was also scheduled for an MRI of the spine, thoracic and lumbar spine done on 05/23/2019. The MRI of the thoracic spine shows Correlating with abnormal uptake on prior whole body bone scan of 4/12/2019, there are acute/subacute fractures are seen within T9 and T11 vertebral bodies, both of which are almost certainly pathologic given foci of suspicious enhancement.   2. Additional osseous metastatic lesion within left paracentral T2 vertebral body. 3. Multilevel degenerative disc disease, to include ventral thoracic cord impingement at T2-T3 and T6-T7 vertebral body levels. The MRI of the lumber spine also shows No fracture nor osseous metastatic disease within the lumbar spine or visualized osseous pelvis. Only partially visualized on this exam is the T11 vertebral body pathologic fracture, better evaluated on concurrent thoracic spine MRI. 2. Severe right foraminal stenosis at L3-L4 with impingement of right L3 foraminal nerve root by prominent focal right foraminal disc bulge. 3. Moderate bilateral foraminal stenoses at L5-S1, each with abutment of respective L5 foraminal nerve roots by adjacent foraminal disc. 4. Moderate L3-L4 and mild L4-L5 canal stenoses. He will be started on Denosumab (Xgeva) 120 mg every 4 weeks. Pt will also be referred to radiation oncology for evaluation to see if he will benefit for treatment in the future. This has been discuss with patient. I have provided him with a script for pain medication percocet 5-325 mg to take every 8 hours as needed for pain. Follow-up in 3 weeks.   Orders Placed This Encounter    COMPLETE CBC & AUTO DIFF WBC    InHouse CBC ([facility])     Standing Status:   Future     Number of Occurrences:   1     Standing Expiration Date:   2/21/7738    METABOLIC PANEL, COMPREHENSIVE     Standing Status:   Future     Number of Occurrences:   1     Standing Expiration Date:   6/17/2020    FERRITIN     Standing Status:   Future     Number of Occurrences:   1     Standing Expiration Date:   6/17/2020    IRON PROFILE     Standing Status:   Future     Number of Occurrences:   1     Standing Expiration Date:   6/17/2020    PROSTATE SPECIFIC AG     Standing Status:   Future     Number of Occurrences:   1     Standing Expiration Date:   7/17/2019       Isa Holloway NP  6/17/2019     I have assessed the patient independently and  agree with the full assessment as outlined. Abel Weber MD, FACP      Please note: This document has been produced using voice recognition software. Unrecognized errors in transcription may be present.

## 2019-07-17 ENCOUNTER — OFFICE VISIT (OUTPATIENT)
Dept: ONCOLOGY | Age: 79
End: 2019-07-17

## 2019-07-17 ENCOUNTER — HOSPITAL ENCOUNTER (OUTPATIENT)
Dept: ONCOLOGY | Age: 79
Discharge: HOME | End: 2019-07-17

## 2019-07-17 DIAGNOSIS — C61 PROSTATE CANCER (HCC): Primary | ICD-10-CM

## 2019-07-17 DIAGNOSIS — C61 PROSTATE CANCER (HCC): ICD-10-CM

## 2019-12-03 RX ORDER — DIPHENHYDRAMINE HYDROCHLORIDE 50 MG/ML
50 INJECTION, SOLUTION INTRAMUSCULAR; INTRAVENOUS AS NEEDED
Status: CANCELLED
Start: 2019-12-10

## 2019-12-03 RX ORDER — ACETAMINOPHEN 325 MG/1
650 TABLET ORAL AS NEEDED
Status: CANCELLED
Start: 2019-12-10

## 2019-12-03 RX ORDER — EPINEPHRINE 1 MG/ML
0.3 INJECTION, SOLUTION, CONCENTRATE INTRAVENOUS AS NEEDED
Status: CANCELLED | OUTPATIENT
Start: 2019-12-10

## 2019-12-03 RX ORDER — ALBUTEROL SULFATE 0.83 MG/ML
2.5 SOLUTION RESPIRATORY (INHALATION) AS NEEDED
Status: CANCELLED
Start: 2019-12-10

## 2019-12-03 RX ORDER — HYDROCORTISONE SODIUM SUCCINATE 100 MG/2ML
100 INJECTION, POWDER, FOR SOLUTION INTRAMUSCULAR; INTRAVENOUS AS NEEDED
Status: CANCELLED | OUTPATIENT
Start: 2019-12-10

## 2019-12-03 RX ORDER — ONDANSETRON 2 MG/ML
8 INJECTION INTRAMUSCULAR; INTRAVENOUS AS NEEDED
Status: CANCELLED | OUTPATIENT
Start: 2019-12-10

## 2019-12-10 ENCOUNTER — HOSPITAL ENCOUNTER (OUTPATIENT)
Dept: INFUSION THERAPY | Age: 79
End: 2019-12-10

## 2020-01-07 ENCOUNTER — APPOINTMENT (OUTPATIENT)
Dept: INFUSION THERAPY | Age: 80
End: 2020-01-07

## 2020-07-13 NOTE — PROGRESS NOTES
Hematology/Oncology  Progress Note    Name: Lynne Rhoades  Date:2019  : 1940    PCP: Solo Linares MD     Mr. Cecilia Howard is a 68 y.o. man who is here for reassessment of his anemia associated with chronic kidney disease, Prostate Cancer     C Procrit at a dose of 60,000 units whenever the hemoglobin is below 10 g/dL and hematocrit is below 30%. Subjective:       Mr. Cecilia Howard is a 74-year-old man who has history of prostate cancer and anemia associated with chronic kidney disease. His daughter was on the phone to provide most of the information. Form chart review, he had an elevation in his PSA to 7 ng/mL then was referred for a bone scan on 2019. This test showed intense uptake involving the medial aspect of the right clavicle. Also there was increased uptake involving the lower thoracic spine at the level of T9 and T11. There was some concern that he may have a small pathologic fracture. With his history of prostate cancer and progressive PSA elevation he was suggested on Degarelix at a dose of 240 mg loading dose followed by 80 mg monthly. His daughter stated he declined systemic therapy. He had received homeopathic therapy for prostate cancer in [place] instead. He subsequently had stroke in 2020. He was very poor historian after stroke with mostly verbal response in yes or no. He has been working with speech therapy after stroke. He was also referred to radiation oncologist.  He just saw radiation oncologist recently and was ordered brain MRI. He did receive radiation to his brain mass previously. Today he is using a walker for support and mobility. The patient otherwise has no other complaints. Denied fever, chills, night sweat, unintentional weight loss, skin lumps or bumps, acute bleeding or bruising issues.  Denied headache, acute vision change, dizziness, chest pain, worsen shortness of breath, palpitation, productive cough, nausea, vomiting, abdominal pain, altered bowel habits, dysuria, worsening bone pain or back pain, new focal numbness or weakness. Past medical history, family history, and social history: these were reviewed and remains unchanged. Past Medical History:   Diagnosis Date    Elevated PSA     Hypercholesterolemia     Hypertension     Stroke (cerebrum) ([facility] [address]      No past surgical history on file.   Social History     Socioeconomic History    Marital status: SINGLE     Spouse name: Not on file    Number of children: Not on file    Years of education: Not on file    Highest education level: Not on file   Occupational History    Not on file   Social Needs    Financial resource strain: Not on file    Food insecurity     Worry: Not on file     Inability: Not on file    Transportation needs     Medical: Not on file     Non-medical: Not on file   Tobacco Use    Smoking status: Never Smoker    Smokeless tobacco: Never Used   Substance and Sexual Activity    Alcohol use: No    Drug use: Not on file    Sexual activity: Not on file   Lifestyle    Physical activity     Days per week: Not on file     Minutes per session: Not on file    Stress: Not on file   Relationships    Social connections     Talks on phone: Not on file     Gets together: Not on file     Attends Congregational service: Not on file     Active member of club or organization: Not on file     Attends meetings of clubs or organizations: Not on file     Relationship status: Not on file    Intimate partner violence     Fear of current or ex partner: Not on file     Emotionally abused: Not on file     Physically abused: Not on file     Forced sexual activity: Not on file   Other Topics Concern    Not on file   Social History Narrative    Not on file     Family History   Problem Relation Age of Onset    Diabetes Mother     Hypertension Mother     Heart Disease Mother     Cancer Father     Heart Disease Brother     Diabetes Brother     Heart Disease Brother      Current Outpatient Medications   Medication Sig Dispense Refill    tamsulosin (FLOMAX) 0.4 mg capsule take 1 capsule by mouth every evening 90 Cap 0    bumetanide (BUMEX) 2 mg tablet Take 2 mg by mouth daily. 0    CARTIA  mg ER capsule   0    metFORMIN (GLUCOPHAGE) 500 mg tablet Take  by mouth two (2) times daily (with meals).  timolol (TIMOPTIC) 0.5 % ophthalmic solution 1 Drop two (2) times a day.  ELIQUIS 5 mg tablet   0    atorvastatin (LIPITOR) 40 mg tablet take 1 tablet by mouth at bedtime for cholesterol  0    fenofibrate micronized (LOFIBRA) 67 mg capsule take 1 capsule by mouth once daily  0    furosemide (LASIX) 20 mg tablet   0    levETIRAcetam (KEPPRA) 750 mg tablet take 1 tablet by mouth twice a day  0    carvedilol (COREG) 12.5 mg tablet Take  by mouth two (2) times daily (with meals).  losartan-hydrochlorothiazide (HYZAAR) 100-25 mg per tablet Take 1 Tab by mouth daily.  omeprazole (PRILOSEC) 10 mg capsule Take 10 mg by mouth daily. Review of Systems   Constitutional: Negative for chills, diaphoresis, fever, malaise/fatigue and weight loss. Respiratory: Negative for cough, hemoptysis, shortness of breath and wheezing. Cardiovascular: Negative for chest pain, palpitations and leg swelling. Gastrointestinal: Negative for abdominal pain, diarrhea, heartburn, nausea and vomiting. Genitourinary: Negative for dysuria, frequency, hematuria and urgency. Musculoskeletal: Negative for joint pain and myalgias. Skin: Negative for itching and rash. Neurological: Negative for dizziness, seizures, weakness and headaches. Psychiatric/Behavioral: Negative for depression. The patient does not have insomnia.              Objective:     Visit Vitals  /74 (BP 1 Location: Left arm, BP Patient Position: Sitting)   Pulse 73   Temp 98.2 °F (36.8 °C) (Oral)   Resp 18   Ht 5' 10\" (1.778 m)   Wt 85.7 kg (189 lb)   SpO2 99%   BMI 27.12 kg/m²         Physical Exam  Constitutional:       General: He is not in acute distress. HENT:      Head: Normocephalic and atraumatic. Eyes:      Pupils: Pupils are equal, round, and reactive to light. Neck:      Musculoskeletal: Neck supple. No neck rigidity. Cardiovascular:      Pulses: Normal pulses. Heart sounds: Normal heart sounds. No murmur. Pulmonary:      Effort: Pulmonary effort is normal. No respiratory distress. Breath sounds: Normal breath sounds. Abdominal:      General: Bowel sounds are normal. There is no distension. Palpations: Abdomen is soft. There is no mass. Tenderness: There is no abdominal tenderness. There is no guarding. Musculoskeletal:         General: No swelling or tenderness. Lymphadenopathy:      Cervical: No cervical adenopathy. Skin:     General: Skin is warm. Findings: No rash. Neurological:      Mental Status: He is alert and oriented to person, place, and time. Mental status is at baseline. Cranial Nerves: No cranial nerve deficit. Psychiatric:         Mood and Affect: Mood normal.          Diagnostics:      No results found for this or any previous visit (from the past 96 hour(s)). Imaging:  No results found for this or any previous visit. No results found for this or any previous visit. Results for orders placed in visit on 06/19/15   CT PELV W CONT           Assessment:     No diagnosis found. Plan:   # Anemia associated with chronic renal failure, stage III:   -- The patient has been scheduled to receive Procrit therapy whenever his hemoglobin is below 10 g/dL and hematocrit is below 30%. --We will repeat CBC today    # Prostate cancer:   -- 12/11/2018 showed that his prostate specific antigen was 7.27 ng/mL. -- Patient was being followed by Dr. Patrick Huntley who just retired recently. A follow-up PSA and and a bone scan was schedule and the result showed intense uptake involving the medial aspect of the right clavicle.   Also there was increased uptake involving the lower thoracic spine at the level of T9 and T11. --The MRI of the thoracic spine shows Correlating with abnormal uptake on prior whole body bone scan of 4/12/2019, there are acute/subacute fractures are seen within T9 and T11 vertebral bodies, both of which are almost certainly pathologic given foci of suspicious enhancement. Additional osseous metastatic lesion within left paracentral T2 vertebral body. Multilevel degenerative disc disease, to include ventral thoracic cord impingement at T2-T3 and T6-T7 vertebral body levels. + The MRI of the lumber spine also shows No fracture nor osseous metastatic disease within the lumbar spine or visualized osseous pelvis. Only partially visualized on this exam is the T11 vertebral body pathologic fracture, better evaluated on concurrent thoracic spine MRI. Severe right foraminal stenosis at L3-L4 with impingement of right L3 foraminal nerve root by prominent focal right foraminal disc bulge. Moderate bilateral foraminal stenoses at L5-S1, each with abutment of respective L5 foraminal nerve roots by adjacent foraminal disc. Moderate L3-L4 and mild L4-L5 canal stenoses. --Per chart review, he was suggested on Degarelix at a dose of 240 mg loading dose followed by 80 mg monthly. His daughter stated he declined systemic therapy. He had received homeopathic therapy for prostate cancer in [place] instead. -- He subsequently had stroke in March 2020. He was very poor historian after stroke with mostly verbal response only in yes or no. He has been working with speech therapy after stroke. He was also referred to radiation oncologist.  He just saw radiation oncologist recently and was ordered brain MRI. He did receive radiation to his brain mass previously. Plan:  -- We will repeat bone scan and PSA. -- We will try to obtain previous records from his radiation oncologist for further information.     -- We will see the patient back in clinic in about 3 weeks. Always sooner if required. Orders Placed This Encounter    NM BONE SCAN [address] BODY     Standing Status:   Future     Standing Expiration Date:   8/14/2021    CBC WITH AUTOMATED DIFF     Standing Status:   Future     Number of Occurrences:   1     Standing Expiration Date:   8/28/3737    METABOLIC PANEL, COMPREHENSIVE     Standing Status:   Future     Number of Occurrences:   1     Standing Expiration Date:   7/15/2021    PROSTATE SPECIFIC AG     Standing Status:   Future     Number of Occurrences:   1     Standing Expiration Date:   7/15/2021           Mr. Arielle Austin has a reminder for a \"due or due soon\" health maintenance. I have asked that he contact his primary care provider for follow-up on this health maintenance. All of patient's questions answered to their apparent satisfaction. They verbally show understanding and agreement with aforementioned plan. Maame Dockery MD  7/18/2020          About 25 minutes were spent for this encounter with more than 50% of the time spent in face-to-face counseling, discussing on diagnosis and management plan going forward, and co-ordination of care. Parts of this document has been produced using Dragon dictation system. Unrecognized errors in transcription may be present. Please do not hesitate to reach out for any questions or clarifications.       CC: Michalene Epley, MD

## 2020-07-14 ENCOUNTER — HOSPITAL ENCOUNTER (OUTPATIENT)
Dept: LAB | Age: 80
Discharge: HOME | End: 2020-07-14
Payer: MEDICARE

## 2020-07-14 ENCOUNTER — OFFICE VISIT (OUTPATIENT)
Dept: ONCOLOGY | Age: 80
End: 2020-07-14

## 2020-07-14 VITALS
TEMPERATURE: 98.2 F | HEART RATE: 73 BPM | BODY MASS INDEX: 27.06 KG/M2 | HEIGHT: 70 IN | RESPIRATION RATE: 18 BRPM | OXYGEN SATURATION: 99 % | DIASTOLIC BLOOD PRESSURE: 74 MMHG | SYSTOLIC BLOOD PRESSURE: 145 MMHG | WEIGHT: 189 LBS

## 2020-07-14 DIAGNOSIS — N18.30 ANEMIA, CHRONIC RENAL FAILURE, STAGE 3 (MODERATE) (HCC): ICD-10-CM

## 2020-07-14 DIAGNOSIS — C61 PROSTATE CANCER (HCC): ICD-10-CM

## 2020-07-14 DIAGNOSIS — C61 PROSTATE CANCER (HCC): Primary | ICD-10-CM

## 2020-07-14 DIAGNOSIS — D72.819 CHRONIC LEUKOPENIA: ICD-10-CM

## 2020-07-14 DIAGNOSIS — D63.1 ANEMIA, CHRONIC RENAL FAILURE, STAGE 3 (MODERATE) (HCC): ICD-10-CM

## 2020-07-14 LAB
ALBUMIN SERPL-MCNC: 3.7 G/DL (ref 3.4–5)
ALBUMIN/GLOB SERPL: 1 {RATIO} (ref 0.8–1.7)
ALP SERPL-CCNC: 117 U/L (ref 45–117)
ALT SERPL-CCNC: 23 U/L (ref 16–61)
ANION GAP SERPL CALC-SCNC: 7 MMOL/L (ref 3–18)
AST SERPL-CCNC: 20 U/L (ref 10–38)
BASOPHILS # BLD: 0 K/UL (ref 0–0.1)
BASOPHILS NFR BLD: 0 % (ref 0–2)
BILIRUB SERPL-MCNC: 0.9 MG/DL (ref 0.2–1)
BUN SERPL-MCNC: 14 MG/DL (ref 7–18)
BUN/CREAT SERPL: 14 (ref 12–20)
CALCIUM SERPL-MCNC: 8.9 MG/DL (ref 8.5–10.1)
CHLORIDE SERPL-SCNC: 105 MMOL/L (ref 100–111)
CO2 SERPL-SCNC: 28 MMOL/L (ref 21–32)
CREAT SERPL-MCNC: 0.97 MG/DL (ref 0.6–1.3)
DIFFERENTIAL METHOD BLD: ABNORMAL
EOSINOPHIL # BLD: 0.1 K/UL (ref 0–0.4)
EOSINOPHIL NFR BLD: 3 % (ref 0–5)
ERYTHROCYTE [DISTWIDTH] IN BLOOD BY AUTOMATED COUNT: 15.2 % (ref 11.6–14.5)
GLOBULIN SER CALC-MCNC: 3.6 G/DL (ref 2–4)
GLUCOSE SERPL-MCNC: 92 MG/DL (ref 74–99)
HCT VFR BLD AUTO: 32.9 % (ref 36–48)
HGB BLD-MCNC: 11 G/DL (ref 13–16)
LYMPHOCYTES # BLD: 0.9 K/UL (ref 0.9–3.6)
LYMPHOCYTES NFR BLD: 34 % (ref 21–52)
MCH RBC QN AUTO: 28.9 PG (ref 24–34)
MCHC RBC AUTO-ENTMCNC: 33.4 G/DL (ref 31–37)
MCV RBC AUTO: 86.6 FL (ref 74–97)
MONOCYTES # BLD: 0.4 K/UL (ref 0.05–1.2)
MONOCYTES NFR BLD: 14 % (ref 3–10)
NEUTS SEG # BLD: 1.3 K/UL (ref 1.8–8)
NEUTS SEG NFR BLD: 49 % (ref 40–73)
PLATELET # BLD AUTO: 113 K/UL (ref 135–420)
POTASSIUM SERPL-SCNC: 3.8 MMOL/L (ref 3.5–5.5)
PROT SERPL-MCNC: 7.3 G/DL (ref 6.4–8.2)
PSA SERPL-MCNC: 7.5 NG/ML (ref 0–4)
RBC # BLD AUTO: 3.8 M/UL (ref 4.7–5.5)
SODIUM SERPL-SCNC: 140 MMOL/L (ref 136–145)
WBC # BLD AUTO: 2.8 K/UL (ref 4.6–13.2)

## 2020-07-14 PROCEDURE — 85025 COMPLETE CBC W/AUTO DIFF WBC: CPT

## 2020-07-14 PROCEDURE — 80053 COMPREHEN METABOLIC PANEL: CPT

## 2020-07-14 PROCEDURE — 36415 COLL VENOUS BLD VENIPUNCTURE: CPT

## 2020-07-14 PROCEDURE — 84153 ASSAY OF PSA TOTAL: CPT

## 2020-07-14 NOTE — PROGRESS NOTES
Identified pt with two pt identifiers(name and ). Reviewed record in preparation for visit and have obtained necessary documentation. Chief Complaint   Patient presents with   [place] [address] Maintenance Due   Topic    DTaP/Tdap/Td series (1 - Tdap)    Shingrix Vaccine Age 50> (1 of 2)    GLAUCOMA SCREENING Q2Y     Medicare Yearly Exam        Coordination of Care Questionnaire:  :   1) Have you been to an emergency room, urgent care, or hospitalized since your last visit? If yes, where when, and reason for visit? no       2. Have seen or consulted any other health care provider since your last visit? If yes, where when, and reason for visit? NO      3) Do you have an Advanced Directive/ Living Will in place? NO  If yes, do we have a copy on file NO  If no, would you like information NO      Learning Assessment 2018   PRIMARY LEARNER Patient   PRIMARY LANGUAGE ENGLISH   LEARNER PREFERENCE PRIMARY LISTENING     DEMONSTRATION   ANSWERED BY Patient   RELATIONSHIP SELF        3 most recent PHQ Screens 2020   Little interest or pleasure in doing things Not at all   Feeling down, depressed, irritable, or hopeless Not at all   Total Score PHQ 2 0        Abuse Screening Questionnaire 2019   Do you ever feel afraid of your partner? N   Are you in a relationship with someone who physically or mentally threatens you? N   Is it safe for you to go home? Y        Fall Risk Assessment, last 12 mths 2020   Able to walk? Yes   Fall in past 12 months?  No

## 2020-08-07 ENCOUNTER — DOCUMENTATION ONLY (OUTPATIENT)
Dept: ONCOLOGY | Age: 80
End: 2020-08-07

## 2020-08-07 NOTE — PROGRESS NOTES
Patient called requesting bone scan to be done at [facility]. The order has been faxed.

## 2020-10-23 DIAGNOSIS — C61 PROSTATE CANCER (HCC): ICD-10-CM

## 2020-10-23 DIAGNOSIS — D72.819 CHRONIC LEUKOPENIA: ICD-10-CM

## 2020-11-04 NOTE — PROGRESS NOTES
Hematology/Oncology  Progress Note    Name: Estefani Corral  : 1940    PCP: Olman Scott MD     Mr. Jatinder El is a 68 y.o. man who is here for reassessment of his anemia associated with chronic kidney disease, Prostate Cancer       Subjective:       Mr. Jatinder El is a 24-year-old man who has history of prostate cancer and anemia associated with chronic kidney disease. Per chart review, he had an elevation in his PSA to 7 ng/mL then was referred for a bone scan on 2019. This test showed intense uptake involving the medial aspect of the right clavicle. Also there was increased uptake involving the lower thoracic spine at the level of T9 and T11. There was some concern that he may have a small pathologic fracture. With his history of prostate cancer and progressive PSA elevation he was suggested on Degarelix at a dose of 240 mg loading dose followed by 80 mg monthly. His daughter stated he declined systemic therapy. He had received homeopathic therapy for prostate cancer in [place] instead. He subsequently had stroke in 2020. He was very poor historian after stroke with mostly verbal response in yes or no. He has been working with speech therapy after stroke. He was also referred to radiation oncologist (?). He stated he did receive radiation to his brain mass previously. Today he is using a walker for support and mobility. The patient otherwise has no other complaints. Denied fever, chills, night sweat, unintentional weight loss, skin lumps or bumps, acute bleeding or bruising issues. Denied headache, acute vision change, dizziness, chest pain, worsen shortness of breath, palpitation, productive cough, nausea, vomiting, abdominal pain, altered bowel habits, dysuria, worsening bone pain or back pain, new focal numbness or weakness. Past medical history, family history, and social history: these were reviewed and remains unchanged.     Past Medical History:   Diagnosis Date    Elevated PSA     Hypercholesterolemia     Hypertension     Stroke (cerebrum) (HCC)      No past surgical history on file. Social History     Socioeconomic History    Marital status: SINGLE     Spouse name: Not on file    Number of children: Not on file    Years of education: Not on file    Highest education level: Not on file   Occupational History    Not on file   Social Needs    Financial resource strain: Not on file    Food insecurity     Worry: Not on file     Inability: Not on file    Transportation needs     Medical: Not on file     Non-medical: Not on file   Tobacco Use    Smoking status: Never Smoker    Smokeless tobacco: Never Used   Substance and Sexual Activity    Alcohol use: No    Drug use: Not on file    Sexual activity: Not on file   Lifestyle    Physical activity     Days per week: Not on file     Minutes per session: Not on file    Stress: Not on file   Relationships    Social connections     Talks on phone: Not on file     Gets together: Not on file     Attends Bahai service: Not on file     Active member of club or organization: Not on file     Attends meetings of clubs or organizations: Not on file     Relationship status: Not on file    Intimate partner violence     Fear of current or ex partner: Not on file     Emotionally abused: Not on file     Physically abused: Not on file     Forced sexual activity: Not on file   Other Topics Concern    Not on file   Social History Narrative    Not on file     Family History   Problem Relation Age of Onset    Diabetes Mother     Hypertension Mother     Heart Disease Mother     Cancer Father     Heart Disease Brother     Diabetes Brother     Heart Disease Brother      Current Outpatient Medications   Medication Sig Dispense Refill    tamsulosin (FLOMAX) 0.4 mg capsule take 1 capsule by mouth every evening 90 Cap 0    bumetanide (BUMEX) 2 mg tablet Take 2 mg by mouth daily.   0    CARTIA  mg ER capsule   0    metFORMIN (GLUCOPHAGE) 500 mg tablet Take  by mouth two (2) times daily (with meals).  timolol (TIMOPTIC) 0.5 % ophthalmic solution 1 Drop two (2) times a day.  ELIQUIS 5 mg tablet   0    atorvastatin (LIPITOR) 40 mg tablet take 1 tablet by mouth at bedtime for cholesterol  0    fenofibrate micronized (LOFIBRA) 67 mg capsule take 1 capsule by mouth once daily  0    furosemide (LASIX) 20 mg tablet   0    levETIRAcetam (KEPPRA) 750 mg tablet take 1 tablet by mouth twice a day  0    carvedilol (COREG) 12.5 mg tablet Take  by mouth two (2) times daily (with meals).  losartan-hydrochlorothiazide (HYZAAR) 100-25 mg per tablet Take 1 Tab by mouth daily.  omeprazole (PRILOSEC) 10 mg capsule Take 10 mg by mouth daily. Review of Systems   Constitutional: Negative for chills, diaphoresis, fever, malaise/fatigue and weight loss. Respiratory: Negative for cough, hemoptysis, shortness of breath and wheezing. Cardiovascular: Negative for chest pain, palpitations and leg swelling. Gastrointestinal: Negative for abdominal pain, diarrhea, heartburn, nausea and vomiting. Genitourinary: Negative for dysuria, frequency, hematuria and urgency. Musculoskeletal: Negative for joint pain and myalgias. Skin: Negative for itching and rash. Neurological: Negative for dizziness, seizures, weakness and headaches. Psychiatric/Behavioral: Negative for depression. The patient does not have insomnia. Objective:     Visit Vitals  BP (!) 153/71   Pulse (!) 58   Temp 98.4 °F (36.9 °C)   Ht 5' 10\" (1.778 m)   Wt 82.6 kg (182 lb 3.2 oz)   SpO2 99%   BMI 26.14 kg/m²         Physical Exam  Constitutional:       General: He is not in acute distress. HENT:      Head: Normocephalic and atraumatic. Eyes:      Pupils: Pupils are equal, round, and reactive to light. Neck:      Musculoskeletal: Neck supple. No neck rigidity. Cardiovascular:      Pulses: Normal pulses.       Heart sounds: Normal heart sounds. No murmur. Pulmonary:      Effort: Pulmonary effort is normal. No respiratory distress. Breath sounds: Normal breath sounds. Abdominal:      General: Bowel sounds are normal. There is no distension. Palpations: Abdomen is soft. There is no mass. Tenderness: There is no abdominal tenderness. There is no guarding. Musculoskeletal:         General: No swelling or tenderness. Lymphadenopathy:      Cervical: No cervical adenopathy. Skin:     General: Skin is warm. Findings: No rash. Neurological:      Mental Status: He is alert and oriented to person, place, and time. Mental status is at baseline. Cranial Nerves: No cranial nerve deficit. Psychiatric:         Mood and Affect: Mood normal.          Diagnostics:      No results found for this or any previous visit (from the past 96 hour(s)). Imaging:  No results found for this or any previous visit. No results found for this or any previous visit. Results for orders placed in visit on 10/23/20   CT CHEST ABD PELV W CONT           Assessment:     1. Prostate cancer metastatic to bone ([facility] [address]    2. Anemia, chronic renal failure, stage 3 (moderate)      Plan:   # Prostate cancer  # Bone metastases:   -- 12/11/2018 showed that his prostate specific antigen was 7.27 ng/mL. -- Patient was being followed by Dr. Suzy Todd who just retired. A follow-up PSA and and a bone scan was schedule and the result showed intense uptake involving the medial aspect of the right clavicle. Also there was increased uptake involving the lower thoracic spine at the level of T9 and T11. --The MRI of the thoracic spine shows correlating with abnormal uptake on prior whole body bone scan of 4/12/2019, there are acute/subacute fractures are seen within T9 and T11 vertebral bodies, both of which are almost certainly pathologic given foci of suspicious enhancement. Additional osseous metastatic lesion within left paracentral T2 vertebral body. Multilevel degenerative disc disease, to include ventral thoracic cord impingement at T2-T3 and T6-T7 vertebral body levels. + The MRI of the lumber spine also shows no fracture nor osseous metastatic disease within the lumbar spine or visualized osseous pelvis. Only partially visualized on this exam is the T11 vertebral body pathologic fracture, better evaluated on concurrent thoracic spine MRI. Severe right foraminal stenosis at L3-L4 with impingement of right L3 foraminal nerve root by prominent focal right foraminal disc bulge. Moderate bilateral foraminal stenoses at L5-S1, each with abutment of respective L5 foraminal nerve roots by adjacent foraminal disc. Moderate L3-L4 and mild L4-L5 canal stenoses. --Per chart review, he was suggested on Degarelix at a dose of 240 mg loading dose followed by 80 mg monthly. He declined systemic therapy. He had received homeopathic therapy for prostate cancer in [place] instead. -- He subsequently had stroke in March 2020. He was poor historian after stroke with mostly verbal response only in yes or no. He has been working with speech therapy after stroke. He was also referred to radiation oncologist per Dr. Shruthi Ramirez note. .    -- 7/14/2020 PSA was elevated from 3.7 to 7.5  -- 9/28/2020 CT C/A/P reported no findings to suggest interval progression of metastatic disease.  -- 9/15/2020 Bone Scan reported persistent moderate to intense activity involving the medial aspect of both clavicular heads. Less intense uptake involving the right anterior fourth rib as well as T9 and T11 vertebra. No new foci of uptake is identified to strongly suggest progression of osseous metastatic disease. -- Patient declined systemic therapy. He had received homeopathic therapy for prostate cancer in [place] instead. Today patient and his daughter reported he has not seen by Rad-Onc for prostate cancer and they are interested in Radiation therapy.      Plan:  -- We will refer him to Re[facility]  -- Plan to start [facility]. He will need dental clearance. -- Labs: CBC, CMP, PSA, Vitamin D    # Anemia associated with chronic renal failure, stage III:   -- The patient has been scheduled to receive Procrit therapy whenever his hemoglobin is below 10 g/dL and hematocrit is below 30%. --We will repeat CBCperiodically    -- We will see the patient back in clinic in about 4 weeks. Always sooner if required. Orders Placed This Encounter    METABOLIC PANEL, COMPREHENSIVE     Standing Status:   Future     Number of Occurrences:   1     Standing Expiration Date:   11/6/2021    VITAMIN D, 25 HYDROXY     Standing Status:   Future     Number of Occurrences:   1     Standing Expiration Date:   11/5/2021    CBC WITH AUTOMATED DIFF     Standing Status:   Future     Number of Occurrences:   1     Standing Expiration Date:   11/6/2021    PROSTATE SPECIFIC AG     Standing Status:   Future     Number of Occurrences:   1     Standing Expiration Date:   11/6/2021           Mr. Ross Salcido has a reminder for a \"due or due soon\" health maintenance. I have asked that he contact his primary care provider for follow-up on this health maintenance. All of patient's questions answered to their apparent satisfaction. They verbally show understanding and agreement with aforementioned plan. Homer Mcadams MD  11/5/2020          About 25 minutes were spent for this encounter with more than 50% of the time spent in face-to-face counseling, discussing on diagnosis and management plan going forward, and co-ordination of care. Parts of this document has been produced using Dragon dictation system. Unrecognized errors in transcription may be present. Please do not hesitate to reach out for any questions or clarifications.       CC: Ayden Foster MD

## 2020-11-05 ENCOUNTER — OFFICE VISIT (OUTPATIENT)
Dept: ONCOLOGY | Age: 80
End: 2020-11-05
Payer: MEDICARE

## 2020-11-05 ENCOUNTER — HOSPITAL ENCOUNTER (OUTPATIENT)
Dept: LAB | Age: 80
Discharge: HOME | End: 2020-11-05
Payer: MEDICARE

## 2020-11-05 VITALS
HEART RATE: 58 BPM | BODY MASS INDEX: 26.08 KG/M2 | TEMPERATURE: 98.4 F | HEIGHT: 70 IN | OXYGEN SATURATION: 99 % | WEIGHT: 182.2 LBS | DIASTOLIC BLOOD PRESSURE: 71 MMHG | SYSTOLIC BLOOD PRESSURE: 153 MMHG

## 2020-11-05 DIAGNOSIS — D63.1 ANEMIA, CHRONIC RENAL FAILURE, STAGE 3 (MODERATE) (HCC): ICD-10-CM

## 2020-11-05 DIAGNOSIS — C79.51 PROSTATE CANCER METASTATIC TO BONE (HCC): ICD-10-CM

## 2020-11-05 DIAGNOSIS — N18.30 ANEMIA, CHRONIC RENAL FAILURE, STAGE 3 (MODERATE) (HCC): ICD-10-CM

## 2020-11-05 DIAGNOSIS — C79.51 PROSTATE CANCER METASTATIC TO BONE (HCC): Primary | ICD-10-CM

## 2020-11-05 DIAGNOSIS — C61 PROSTATE CANCER METASTATIC TO BONE (HCC): Primary | ICD-10-CM

## 2020-11-05 DIAGNOSIS — C61 PROSTATE CANCER METASTATIC TO BONE (HCC): ICD-10-CM

## 2020-11-05 LAB
25(OH)D3 SERPL-MCNC: 41.1 NG/ML (ref 30–100)
ALBUMIN SERPL-MCNC: 3.8 G/DL (ref 3.4–5)
ALBUMIN/GLOB SERPL: 1.1 {RATIO} (ref 0.8–1.7)
ALP SERPL-CCNC: 126 U/L (ref 45–117)
ALT SERPL-CCNC: 29 U/L (ref 16–61)
ANION GAP SERPL CALC-SCNC: 3 MMOL/L (ref 3–18)
AST SERPL-CCNC: 20 U/L (ref 10–38)
BASOPHILS # BLD: 0 K/UL (ref 0–0.1)
BASOPHILS NFR BLD: 0 % (ref 0–2)
BILIRUB SERPL-MCNC: 0.6 MG/DL (ref 0.2–1)
BUN SERPL-MCNC: 18 MG/DL (ref 7–18)
BUN/CREAT SERPL: 15 (ref 12–20)
CALCIUM SERPL-MCNC: 9.2 MG/DL (ref 8.5–10.1)
CHLORIDE SERPL-SCNC: 110 MMOL/L (ref 100–111)
CO2 SERPL-SCNC: 30 MMOL/L (ref 21–32)
CREAT SERPL-MCNC: 1.21 MG/DL (ref 0.6–1.3)
DIFFERENTIAL METHOD BLD: ABNORMAL
EOSINOPHIL # BLD: 0.1 K/UL (ref 0–0.4)
EOSINOPHIL NFR BLD: 2 % (ref 0–5)
ERYTHROCYTE [DISTWIDTH] IN BLOOD BY AUTOMATED COUNT: 14.8 % (ref 11.6–14.5)
GLOBULIN SER CALC-MCNC: 3.4 G/DL (ref 2–4)
GLUCOSE SERPL-MCNC: 114 MG/DL (ref 74–99)
HCT VFR BLD AUTO: 33.6 % (ref 36–48)
HGB BLD-MCNC: 11.4 G/DL (ref 13–16)
LYMPHOCYTES # BLD: 1.1 K/UL (ref 0.9–3.6)
LYMPHOCYTES NFR BLD: 42 % (ref 21–52)
MCH RBC QN AUTO: 29.5 PG (ref 24–34)
MCHC RBC AUTO-ENTMCNC: 33.9 G/DL (ref 31–37)
MCV RBC AUTO: 87 FL (ref 74–97)
MONOCYTES # BLD: 0.4 K/UL (ref 0.05–1.2)
MONOCYTES NFR BLD: 15 % (ref 3–10)
NEUTS SEG # BLD: 1 K/UL (ref 1.8–8)
NEUTS SEG NFR BLD: 41 % (ref 40–73)
PLATELET # BLD AUTO: 116 K/UL (ref 135–420)
PMV BLD AUTO: 13 FL (ref 9.2–11.8)
POTASSIUM SERPL-SCNC: 4.4 MMOL/L (ref 3.5–5.5)
PROT SERPL-MCNC: 7.2 G/DL (ref 6.4–8.2)
PSA SERPL-MCNC: 7.4 NG/ML (ref 0–4)
RBC # BLD AUTO: 3.86 M/UL (ref 4.7–5.5)
SODIUM SERPL-SCNC: 143 MMOL/L (ref 136–145)
WBC # BLD AUTO: 2.5 K/UL (ref 4.6–13.2)

## 2020-11-05 PROCEDURE — 84153 ASSAY OF PSA TOTAL: CPT

## 2020-11-05 PROCEDURE — 80053 COMPREHEN METABOLIC PANEL: CPT

## 2020-11-05 PROCEDURE — 99214 OFFICE O/P EST MOD 30 MIN: CPT | Performed by: INTERNAL MEDICINE

## 2020-11-05 PROCEDURE — 36415 COLL VENOUS BLD VENIPUNCTURE: CPT

## 2020-11-05 PROCEDURE — 82306 VITAMIN D 25 HYDROXY: CPT

## 2020-11-05 PROCEDURE — 85025 COMPLETE CBC W/AUTO DIFF WBC: CPT

## 2020-11-05 PROCEDURE — G0463 HOSPITAL OUTPT CLINIC VISIT: HCPCS | Performed by: INTERNAL MEDICINE

## 2020-12-03 RX ORDER — DIPHENHYDRAMINE HYDROCHLORIDE 50 MG/ML
50 INJECTION, SOLUTION INTRAMUSCULAR; INTRAVENOUS AS NEEDED
Status: CANCELLED
Start: 2020-12-03

## 2020-12-03 RX ORDER — EPINEPHRINE 1 MG/ML
0.3 INJECTION, SOLUTION, CONCENTRATE INTRAVENOUS AS NEEDED
Status: CANCELLED | OUTPATIENT
Start: 2020-12-03

## 2020-12-03 RX ORDER — ONDANSETRON 2 MG/ML
8 INJECTION INTRAMUSCULAR; INTRAVENOUS AS NEEDED
Status: CANCELLED | OUTPATIENT
Start: 2020-12-03

## 2020-12-03 RX ORDER — ALBUTEROL SULFATE 0.83 MG/ML
2.5 SOLUTION RESPIRATORY (INHALATION) AS NEEDED
Status: CANCELLED
Start: 2020-12-03

## 2020-12-03 RX ORDER — HYDROCORTISONE SODIUM SUCCINATE 100 MG/2ML
100 INJECTION, POWDER, FOR SOLUTION INTRAMUSCULAR; INTRAVENOUS AS NEEDED
Status: CANCELLED | OUTPATIENT
Start: 2020-12-03

## 2020-12-03 RX ORDER — ACETAMINOPHEN 325 MG/1
650 TABLET ORAL AS NEEDED
Status: CANCELLED
Start: 2020-12-03

## 2020-12-10 ENCOUNTER — HOSPITAL ENCOUNTER (OUTPATIENT)
Dept: RADIATION THERAPY | Age: 80
Discharge: HOME | End: 2020-12-10
Payer: MEDICARE

## 2020-12-10 PROCEDURE — 99211 OFF/OP EST MAY X REQ PHY/QHP: CPT

## 2020-12-12 NOTE — PROGRESS NOTES
Hematology/Oncology  Progress Note    Name: [facility]  : 1940    PCP: Troy Denny MD     Mr. Razia Pabon is a 68 y.o. man who is here for reassessment of his anemia associated with chronic kidney disease, Prostate Cancer       Subjective:       Mr. Razia Pabon is a 68-year-old man who has history of prostate cancer and anemia associated with chronic kidney disease. Per chart review, he had an elevation in his PSA to 7 ng/mL then was referred for a bone scan on 2019. This test showed intense uptake involving the medial aspect of the right clavicle. Also there was increased uptake involving the lower thoracic spine at the level of T9 and T11. There was some concern that he may have a small pathologic fracture. With his history of prostate cancer and progressive PSA elevation he was suggested on Degarelix at a dose of 240 mg loading dose followed by 80 mg monthly. His daughter stated he declined systemic therapy. He had received homeopathic therapy for prostate cancer in [place] instead. He subsequently had stroke in 2020. He was very poor historian after stroke with mostly verbal response in yes or no. He has been working with speech therapy after stroke. He stated he did receive radiation to his brain mass previously. Today he is using a walker for support and mobility. The patient was accompanied by her daughter. The patient otherwise has no other complaints. Denied fever, chills, night sweat, unintentional weight loss, skin lumps or bumps, acute bleeding or bruising issues. Denied headache, acute vision change, dizziness, chest pain, worsen shortness of breath, palpitation, productive cough, nausea, vomiting, abdominal pain, altered bowel habits, dysuria, worsening bone pain or back pain, new focal numbness or weakness. Past medical history, family history, and social history: these were reviewed and remains unchanged.     Past Medical History:   Diagnosis Date    Elevated PSA     Hypercholesterolemia     Hypertension     Stroke (cerebrum) ([facility] [address]      History reviewed. No pertinent surgical history. Social History     Socioeconomic History    Marital status: SINGLE     Spouse name: Not on file    Number of children: Not on file    Years of education: Not on file    Highest education level: Not on file   Occupational History    Not on file   Social Needs    Financial resource strain: Not on file    Food insecurity     Worry: Not on file     Inability: Not on file    Transportation needs     Medical: Not on file     Non-medical: Not on file   Tobacco Use    Smoking status: Never Smoker    Smokeless tobacco: Never Used   Substance and Sexual Activity    Alcohol use: No    Drug use: Not on file    Sexual activity: Not on file   Lifestyle    Physical activity     Days per week: Not on file     Minutes per session: Not on file    Stress: Not on file   Relationships    Social connections     Talks on phone: Not on file     Gets together: Not on file     Attends Buddhism service: Not on file     Active member of club or organization: Not on file     Attends meetings of clubs or organizations: Not on file     Relationship status: Not on file    Intimate partner violence     Fear of current or ex partner: Not on file     Emotionally abused: Not on file     Physically abused: Not on file     Forced sexual activity: Not on file   Other Topics Concern    Not on file   Social History Narrative    Not on file     Family History   Problem Relation Age of Onset    Diabetes Mother     Hypertension Mother     Heart Disease Mother     Cancer Father     Heart Disease Brother     Diabetes Brother     Heart Disease Brother      Current Outpatient Medications   Medication Sig Dispense Refill    tamsulosin (FLOMAX) 0.4 mg capsule take 1 capsule by mouth every evening 90 Cap 0    bumetanide (BUMEX) 2 mg tablet Take 2 mg by mouth daily.   0    CARTIA  mg ER capsule 0    metFORMIN (GLUCOPHAGE) 500 mg tablet Take  by mouth two (2) times daily (with meals).  timolol (TIMOPTIC) 0.5 % ophthalmic solution 1 Drop two (2) times a day.  ELIQUIS 5 mg tablet   0    atorvastatin (LIPITOR) 40 mg tablet take 1 tablet by mouth at bedtime for cholesterol  0    fenofibrate micronized (LOFIBRA) 67 mg capsule take 1 capsule by mouth once daily  0    furosemide (LASIX) 20 mg tablet   0    levETIRAcetam (KEPPRA) 750 mg tablet take 1 tablet by mouth twice a day  0    carvedilol (COREG) 12.5 mg tablet Take  by mouth two (2) times daily (with meals).  losartan-hydrochlorothiazide (HYZAAR) 100-25 mg per tablet Take 1 Tab by mouth daily.  omeprazole (PRILOSEC) 10 mg capsule Take 10 mg by mouth daily. Review of Systems   Constitutional: Negative for chills, diaphoresis, fever, malaise/fatigue and weight loss. Respiratory: Negative for cough, hemoptysis, shortness of breath and wheezing. Cardiovascular: Negative for chest pain, palpitations and leg swelling. Gastrointestinal: Negative for abdominal pain, diarrhea, heartburn, nausea and vomiting. Genitourinary: Negative for dysuria, frequency, hematuria and urgency. Musculoskeletal: Negative for joint pain and myalgias. Skin: Negative for itching and rash. Neurological: Negative for dizziness, seizures, weakness and headaches. Psychiatric/Behavioral: Negative for depression. The patient does not have insomnia. Objective:     Visit Vitals  /86   Pulse 62   Resp 16   Ht 5' 10\" (1.778 m)   Wt 83.5 kg (184 lb)   SpO2 97%   BMI 26.40 kg/m²         Physical Exam  Constitutional:       General: He is not in acute distress. HENT:      Head: Normocephalic and atraumatic. Eyes:      Pupils: Pupils are equal, round, and reactive to light. Neck:      Musculoskeletal: Neck supple. No neck rigidity. Cardiovascular:      Pulses: Normal pulses. Heart sounds: Normal heart sounds. No murmur. Pulmonary:      Effort: Pulmonary effort is normal. No respiratory distress. Breath sounds: Normal breath sounds. Abdominal:      General: Bowel sounds are normal. There is no distension. Palpations: Abdomen is soft. There is no mass. Tenderness: There is no abdominal tenderness. There is no guarding. Musculoskeletal:         General: No swelling or tenderness. Lymphadenopathy:      Cervical: No cervical adenopathy. Skin:     General: Skin is warm. Findings: No rash. Neurological:      Mental Status: He is alert and oriented to person, place, and time. Mental status is at baseline. Cranial Nerves: No cranial nerve deficit. Psychiatric:         Mood and Affect: Mood normal.          Diagnostics:      No results found for this or any previous visit (from the past 96 hour(s)). Imaging:  No results found for this or any previous visit. No results found for this or any previous visit. Results for orders placed in visit on 10/23/20   CT CHEST ABD PELV W CONT           Assessment:     1. Prostate cancer metastatic to bone ([address]    2. Prostate cancer ([facility] [address]    3. Anemia, chronic renal failure, stage 3 (moderate)      Plan:   # Prostate cancer  # Bone metastases:   -- 12/11/2018 showed that his prostate specific antigen was 7.27 ng/mL. -- Patient was being followed by Dr. Prashant Smith who just retired. A follow-up PSA and and a bone scan was schedule and the result showed intense uptake involving the medial aspect of the right clavicle. Also there was increased uptake involving the lower thoracic spine at the level of T9 and T11. --The MRI of the thoracic spine shows correlating with abnormal uptake on prior whole body bone scan of 4/12/2019, there are acute/subacute fractures are seen within T9 and T11 vertebral bodies, both of which are almost certainly pathologic given foci of suspicious enhancement.   Additional osseous metastatic lesion within left paracentral T2 vertebral body. Multilevel degenerative disc disease, to include ventral thoracic cord impingement at T2-T3 and T6-T7 vertebral body levels. + The MRI of the lumber spine also shows no fracture nor osseous metastatic disease within the lumbar spine or visualized osseous pelvis. Only partially visualized on this exam is the T11 vertebral body pathologic fracture, better evaluated on concurrent thoracic spine MRI. Severe right foraminal stenosis at L3-L4 with impingement of right L3 foraminal nerve root by prominent focal right foraminal disc bulge. Moderate bilateral foraminal stenoses at L5-S1, each with abutment of respective L5 foraminal nerve roots by adjacent foraminal disc. Moderate L3-L4 and mild L4-L5 canal stenoses. -- 4/2019 He was suggested on Degarelix at a dose of 240 mg loading dose followed by 80 mg monthly. He declined systemic chemotherapy. Subsequently he had received homeopathic therapy for prostate cancer in [place] instead. -- He had stroke in March 2020. He was poor historian after stroke with mostly verbal response only in yes or no. He has been working with speech therapy after stroke. -- 7/14/2020 PSA was elevated from 3.7 to 7.5  -- 9/28/2020 CT C/A/P reported no findings to suggest interval progression of metastatic disease.  -- 9/15/2020 Bone Scan reported persistent moderate to intense activity involving the medial aspect of both clavicular heads. Less intense uptake involving the right anterior fourth rib as well as T9 and T11 vertebra. No new foci of uptake is identified to strongly suggest progression of osseous metastatic disease. -- 11/5/2020 PSA was 7.4  -- He has seen Dr. Mannie Orlando who did not see any current role for radiation as his urinary function is excellent and he has metastatic disease that is asymptomatic. -- Today we have lengthy discussion with the patient and family regards options of therapy.  He was agreeable with re-initiation of ADT given his excellent response at pervious time. He continues to decline chemotherapy. I have explained to the patient about risk and benefits of Degarelix and alternative plans. The patient and family verbally understood and were agreeable with the plan. Plan:  -- We will start on Degarelix in 2 weeks tentatively  -- Plan to start Xgeva. He will need dental clearance. Family will collect dentist note today  -- Labs: CBC, CMP, PSA, Vitamin D    Degarelix  Day 1: Degarelix 240mg in one 4-week cycle, followed by:  Day 1: Degarelix 80mg (administered as 1 subcutaneous 4mL injection). Repeat cycle every 4 weeks. # Anemia associated with chronic renal failure, stage III:   -- The patient has been scheduled to receive Procrit therapy whenever his hemoglobin is below 10 g/dL and hematocrit is below 30%. --We will repeat CBCperiodically    -- We will see the patient back in clinic in about 2 months. Always sooner if required. No orders of the defined types were placed in this encounter. Mr. Jonna Paris has a reminder for a \"due or due soon\" health maintenance. I have asked that he contact his primary care provider for follow-up on this health maintenance. All of patient's questions answered to their apparent satisfaction. They verbally show understanding and agreement with aforementioned plan. Awais Julio MD  12/17/2020          About 45 minutes were spent for this encounter with more than 50% of the time spent in face-to-face counseling, discussing on diagnosis and management plan going forward, and co-ordination of care. Parts of this document has been produced using Dragon dictation system. Unrecognized errors in transcription may be present. Please do not hesitate to reach out for any questions or clarifications.       CC: Sammie Denny MD

## 2020-12-17 ENCOUNTER — OFFICE VISIT (OUTPATIENT)
Dept: ONCOLOGY | Age: 80
End: 2020-12-17
Payer: MEDICARE

## 2020-12-17 VITALS
SYSTOLIC BLOOD PRESSURE: 130 MMHG | HEIGHT: 70 IN | HEART RATE: 62 BPM | BODY MASS INDEX: 26.34 KG/M2 | OXYGEN SATURATION: 97 % | RESPIRATION RATE: 16 BRPM | WEIGHT: 184 LBS | DIASTOLIC BLOOD PRESSURE: 86 MMHG

## 2020-12-17 DIAGNOSIS — D63.1 ANEMIA, CHRONIC RENAL FAILURE, STAGE 3 (MODERATE) (HCC): ICD-10-CM

## 2020-12-17 DIAGNOSIS — N18.30 ANEMIA, CHRONIC RENAL FAILURE, STAGE 3 (MODERATE) (HCC): ICD-10-CM

## 2020-12-17 DIAGNOSIS — C79.51 PROSTATE CANCER METASTATIC TO BONE (HCC): Primary | ICD-10-CM

## 2020-12-17 DIAGNOSIS — C61 PROSTATE CANCER METASTATIC TO BONE (HCC): Primary | ICD-10-CM

## 2020-12-17 DIAGNOSIS — C61 PROSTATE CANCER (HCC): ICD-10-CM

## 2020-12-17 PROCEDURE — G0463 HOSPITAL OUTPT CLINIC VISIT: HCPCS | Performed by: INTERNAL MEDICINE

## 2020-12-17 PROCEDURE — 99215 OFFICE O/P EST HI 40 MIN: CPT | Performed by: INTERNAL MEDICINE

## 2021-01-28 DIAGNOSIS — C61 PROSTATE CANCER (HCC): Primary | ICD-10-CM

## 2021-02-03 ENCOUNTER — HOSPITAL ENCOUNTER (OUTPATIENT)
Dept: INFUSION THERAPY | Age: 81
Discharge: HOME | End: 2021-02-03

## 2021-02-09 ENCOUNTER — TELEPHONE (OUTPATIENT)
Dept: ONCOLOGY | Age: 81
End: 2021-02-09

## 2021-02-15 NOTE — PROGRESS NOTES
Hematology/Oncology  Progress Note    Name: [facility]  : 1940    PCP: Jazmyne Hooper MD     Mr. Ari Dudley is a 68 y.o. man who is here for reassessment of his anemia associated with chronic kidney disease, Prostate Cancer       Subjective:       Mr. Ari Dudley is a 22-year-old man who has history of prostate cancer and anemia associated with chronic kidney disease. Per chart review, he had an elevation in his PSA to 7 ng/mL then was referred for a bone scan on 2019. This test showed intense uptake involving the medial aspect of the right clavicle. Also there was increased uptake involving the lower thoracic spine at the level of T9 and T11. There was some concern that he may have a small pathologic fracture. With his history of prostate cancer and progressive PSA elevation he was suggested on Degarelix at a dose of 240 mg loading dose followed by 80 mg monthly. His daughter stated he declined systemic therapy. He had received homeopathic therapy for prostate cancer in [place] instead. He subsequently had stroke in 2020. He was very poor historian after stroke with mostly verbal response in yes or no. He has been working with speech therapy after stroke. He stated he did receive radiation to his brain mass previously. Today he is using a walker for support and mobility. The patient was accompanied by her daughter. On 2/3/2021 C1 Degarelix and Xgeva. He reported some soreness at the site of injection which occurred after last therapy but improved since then. The patient otherwise has no other complaints. Denied fever, chills, night sweat, unintentional weight loss, skin lumps or bumps, acute bleeding or bruising issues. Denied headache, acute vision change, dizziness, chest pain, worsen shortness of breath, palpitation, productive cough, nausea, vomiting, abdominal pain, altered bowel habits, dysuria, worsening bone pain or back pain, new focal numbness or weakness.      Past medical history, family history, and social history: these were reviewed and remains unchanged. Past Medical History:   Diagnosis Date    Elevated PSA     Hypercholesterolemia     Hypertension     Stroke (cerebrum) ([facility] [address]      History reviewed. No pertinent surgical history.   Social History     Socioeconomic History    Marital status: SINGLE     Spouse name: Not on file    Number of children: Not on file    Years of education: Not on file    Highest education level: Not on file   Occupational History    Not on file   Social Needs    Financial resource strain: Not on file    Food insecurity     Worry: Not on file     Inability: Not on file    Transportation needs     Medical: Not on file     Non-medical: Not on file   Tobacco Use    Smoking status: Never Smoker    Smokeless tobacco: Never Used   Substance and Sexual Activity    Alcohol use: No    Drug use: Not on file    Sexual activity: Not on file   Lifestyle    Physical activity     Days per week: Not on file     Minutes per session: Not on file    Stress: Not on file   Relationships    Social connections     Talks on phone: Not on file     Gets together: Not on file     Attends Alevism service: Not on file     Active member of club or organization: Not on file     Attends meetings of clubs or organizations: Not on file     Relationship status: Not on file    Intimate partner violence     Fear of current or ex partner: Not on file     Emotionally abused: Not on file     Physically abused: Not on file     Forced sexual activity: Not on file   Other Topics Concern    Not on file   Social History Narrative    Not on file     Family History   Problem Relation Age of Onset    Diabetes Mother     Hypertension Mother     Heart Disease Mother     Cancer Father     Heart Disease Brother     Diabetes Brother     Heart Disease Brother      Current Outpatient Medications   Medication Sig Dispense Refill    tamsulosin (FLOMAX) 0.4 mg capsule take 1 capsule by mouth every evening 90 Cap 0    bumetanide (BUMEX) 2 mg tablet Take 2 mg by mouth daily. 0    CARTIA  mg ER capsule   0    metFORMIN (GLUCOPHAGE) 500 mg tablet Take  by mouth two (2) times daily (with meals).  timolol (TIMOPTIC) 0.5 % ophthalmic solution 1 Drop two (2) times a day.  ELIQUIS 5 mg tablet   0    atorvastatin (LIPITOR) 40 mg tablet take 1 tablet by mouth at bedtime for cholesterol  0    fenofibrate micronized (LOFIBRA) 67 mg capsule take 1 capsule by mouth once daily  0    furosemide (LASIX) 20 mg tablet   0    levETIRAcetam (KEPPRA) 750 mg tablet take 1 tablet by mouth twice a day  0    carvedilol (COREG) 12.5 mg tablet Take  by mouth two (2) times daily (with meals).  losartan-hydrochlorothiazide (HYZAAR) 100-25 mg per tablet Take 1 Tab by mouth daily.  omeprazole (PRILOSEC) 10 mg capsule Take 10 mg by mouth daily. Review of Systems   Constitutional: Negative for chills, diaphoresis, fever, malaise/fatigue and weight loss. Respiratory: Negative for cough, hemoptysis, shortness of breath and wheezing. Cardiovascular: Negative for chest pain, palpitations and leg swelling. Gastrointestinal: Negative for abdominal pain, diarrhea, heartburn, nausea and vomiting. Genitourinary: Negative for dysuria, frequency, hematuria and urgency. Musculoskeletal: Negative for joint pain and myalgias. Skin: Negative for itching and rash. Neurological: Negative for dizziness, seizures, weakness and headaches. Psychiatric/Behavioral: Negative for depression. The patient does not have insomnia. Objective:     Visit Vitals  /64   Pulse 65   Temp 98.6 °F (37 °C) (Oral)   Resp 18   Ht 5' 10\" (1.778 m)   Wt 79.8 kg (176 lb)   SpO2 98%   BMI 25.25 kg/m²         Physical Exam  Constitutional:       General: He is not in acute distress. HENT:      Head: Normocephalic and atraumatic.    Eyes:      Pupils: Pupils are equal, round, and reactive to light. Neck:      Musculoskeletal: Neck supple. No neck rigidity. Cardiovascular:      Pulses: Normal pulses. Heart sounds: Normal heart sounds. No murmur. Pulmonary:      Effort: Pulmonary effort is normal. No respiratory distress. Breath sounds: Normal breath sounds. Abdominal:      General: Bowel sounds are normal. There is no distension. Palpations: Abdomen is soft. There is no mass. Tenderness: There is no abdominal tenderness. There is no guarding. Musculoskeletal:         General: No swelling or tenderness. Lymphadenopathy:      Cervical: No cervical adenopathy. Skin:     General: Skin is warm. Findings: No rash. Neurological:      Mental Status: He is alert and oriented to person, place, and time. Mental status is at baseline. Cranial Nerves: No cranial nerve deficit. Psychiatric:         Mood and Affect: Mood normal.          Diagnostics:      No results found for this or any previous visit (from the past 96 hour(s)). Imaging:  No results found for this or any previous visit. No results found for this or any previous visit. Results for orders placed in visit on 10/23/20   CT CHEST ABD PELV W CONT           Assessment:     1. Prostate cancer metastatic to bone ([address]    2. Prostate cancer ([address]    3. Bone metastases ([address]    4. Anemia, chronic renal failure, stage 3 (moderate)      Plan:   # Prostate cancer  # Bone metastases:   -- 12/11/2018 showed that his prostate specific antigen was 7.27 ng/mL. -- Patient was being followed by Dr. Jr Diaz who just retired. A follow-up PSA and and a bone scan was schedule and the result showed intense uptake involving the medial aspect of the right clavicle. Also there was increased uptake involving the lower thoracic spine at the level of T9 and T11.    --The MRI of the thoracic spine shows correlating with abnormal uptake on prior whole body bone scan of 4/12/2019, there are acute/subacute fractures are seen within T9 and T11 vertebral bodies, both of which are almost certainly pathologic given foci of suspicious enhancement. Additional osseous metastatic lesion within left paracentral T2 vertebral body. Multilevel degenerative disc disease, to include ventral thoracic cord impingement at T2-T3 and T6-T7 vertebral body levels. + The MRI of the lumber spine also shows no fracture nor osseous metastatic disease within the lumbar spine or visualized osseous pelvis. Only partially visualized on this exam is the T11 vertebral body pathologic fracture, better evaluated on concurrent thoracic spine MRI. Severe right foraminal stenosis at L3-L4 with impingement of right L3 foraminal nerve root by prominent focal right foraminal disc bulge. Moderate bilateral foraminal stenoses at L5-S1, each with abutment of respective L5 foraminal nerve roots by adjacent foraminal disc. Moderate L3-L4 and mild L4-L5 canal stenoses. -- 4/2019 He was suggested on Degarelix at a dose of 240 mg loading dose followed by 80 mg monthly. He declined systemic chemotherapy. Subsequently he had received homeopathic therapy for prostate cancer in [place] instead. -- He had stroke in March 2020. He was poor historian after stroke with mostly verbal response only in yes or no. He has been working with speech therapy after stroke. -- 7/14/2020 PSA was elevated from 3.7 to 7.5  -- 9/28/2020 CT C/A/P reported no findings to suggest interval progression of metastatic disease.  -- 9/15/2020 Bone Scan reported persistent moderate to intense activity involving the medial aspect of both clavicular heads. Less intense uptake involving the right anterior fourth rib as well as T9 and T11 vertebra. No new foci of uptake is identified to strongly suggest progression of osseous metastatic disease.   -- 11/5/2020 PSA was 7.4  -- He has seen Dr. Brian Segal who did not see any current role for radiation as his urinary function is excellent and he has metastatic disease that is asymptomatic. He was agreeable with re-initiation of ADT given his excellent response at pervious time. He continues to decline chemotherapy. -- 2/3/2021 C1 Degarelix and Xgeva at [facility]. He has tolerated therapy without high graded toxicities. Plan:  -- He will continue Degarelix and Xgeva injection at [facility] as scheduled. Daniela May -- Monitor labs: CBC, CMP, PSA    Degarelix  Day 1: Degarelix 240mg in one 4-week cycle, followed by:  Day 1: Degarelix 80mg (administered as 1 subcutaneous 4mL injection). Repeat cycle every 4 weeks. # Anemia associated with chronic renal failure, stage III:   -- The patient has been scheduled to receive Procrit therapy whenever his hemoglobin is below 10 g/dL and hematocrit is below 30%. -- Recent CBC and CMP reviewed. We will repeat CBC periodically    -- We will see the patient back in clinic in about 2 months. Always sooner if required. Orders Placed This Encounter    METABOLIC PANEL, COMPREHENSIVE     Standing Status:   Future     Standing Expiration Date:   2/19/2022    CBC WITH AUTOMATED DIFF     Standing Status:   Future     Standing Expiration Date:   2/19/2022    PROSTATE SPECIFIC AG     Standing Status:   Future     Standing Expiration Date:   2/19/2022           Mr. Kristen Bah has a reminder for a \"due or due soon\" health maintenance. I have asked that he contact his primary care provider for follow-up on this health maintenance. All of patient's questions answered to their apparent satisfaction. They verbally show understanding and agreement with aforementioned plan. Juan Kowalski MD  2/18/2021          About 25 minutes were spent for this encounter with more than 50% of the time spent in face-to-face counseling, discussing on diagnosis and management plan going forward, and co-ordination of care. Parts of this document has been produced using Dragon dictation system. Unrecognized errors in transcription may be present. Please do not hesitate to reach out for any questions or clarifications.       CC: Angela Fontaine MD

## 2021-02-18 ENCOUNTER — OFFICE VISIT (OUTPATIENT)
Dept: ONCOLOGY | Age: 81
End: 2021-02-18
Payer: MEDICARE

## 2021-02-18 ENCOUNTER — HOSPITAL ENCOUNTER (OUTPATIENT)
Dept: LAB | Age: 81
Discharge: HOME | End: 2021-02-18
Payer: MEDICARE

## 2021-02-18 VITALS
SYSTOLIC BLOOD PRESSURE: 138 MMHG | BODY MASS INDEX: 25.2 KG/M2 | DIASTOLIC BLOOD PRESSURE: 64 MMHG | OXYGEN SATURATION: 98 % | WEIGHT: 176 LBS | HEIGHT: 70 IN | HEART RATE: 65 BPM | RESPIRATION RATE: 18 BRPM | TEMPERATURE: 98.6 F

## 2021-02-18 DIAGNOSIS — D63.1 ANEMIA, CHRONIC RENAL FAILURE, STAGE 3 (MODERATE) (HCC): ICD-10-CM

## 2021-02-18 DIAGNOSIS — C79.51 PROSTATE CANCER METASTATIC TO BONE (HCC): ICD-10-CM

## 2021-02-18 DIAGNOSIS — N18.30 ANEMIA, CHRONIC RENAL FAILURE, STAGE 3 (MODERATE) (HCC): ICD-10-CM

## 2021-02-18 DIAGNOSIS — C79.51 PROSTATE CANCER METASTATIC TO BONE (HCC): Primary | ICD-10-CM

## 2021-02-18 DIAGNOSIS — C61 PROSTATE CANCER METASTATIC TO BONE (HCC): ICD-10-CM

## 2021-02-18 DIAGNOSIS — C61 PROSTATE CANCER METASTATIC TO BONE (HCC): Primary | ICD-10-CM

## 2021-02-18 DIAGNOSIS — C79.51 BONE METASTASES (HCC): ICD-10-CM

## 2021-02-18 DIAGNOSIS — C61 PROSTATE CANCER (HCC): ICD-10-CM

## 2021-02-18 LAB
ALBUMIN SERPL-MCNC: 3.6 G/DL (ref 3.4–5)
ALBUMIN/GLOB SERPL: 1.2 {RATIO} (ref 0.8–1.7)
ALP SERPL-CCNC: 133 U/L (ref 45–117)
ALT SERPL-CCNC: 31 U/L (ref 16–61)
ANION GAP SERPL CALC-SCNC: 4 MMOL/L (ref 3–18)
AST SERPL-CCNC: 27 U/L (ref 10–38)
BASOPHILS # BLD: 0 K/UL (ref 0–0.06)
BASOPHILS NFR BLD: 0 % (ref 0–3)
BILIRUB SERPL-MCNC: 0.7 MG/DL (ref 0.2–1)
BUN SERPL-MCNC: 26 MG/DL (ref 7–18)
BUN/CREAT SERPL: 24 (ref 12–20)
CALCIUM SERPL-MCNC: 8.7 MG/DL (ref 8.5–10.1)
CHLORIDE SERPL-SCNC: 109 MMOL/L (ref 100–111)
CO2 SERPL-SCNC: 28 MMOL/L (ref 21–32)
CREAT SERPL-MCNC: 1.07 MG/DL (ref 0.6–1.3)
DIFFERENTIAL METHOD BLD: ABNORMAL
EOSINOPHIL # BLD: 0 K/UL (ref 0–0.4)
EOSINOPHIL NFR BLD: 1 % (ref 0–5)
ERYTHROCYTE [DISTWIDTH] IN BLOOD BY AUTOMATED COUNT: 14.9 % (ref 11.6–14.5)
GLOBULIN SER CALC-MCNC: 3.1 G/DL (ref 2–4)
GLUCOSE SERPL-MCNC: 111 MG/DL (ref 74–99)
HCT VFR BLD AUTO: 34.8 % (ref 36–48)
HGB BLD-MCNC: 11.4 G/DL (ref 13–16)
LYMPHOCYTES # BLD: 0.6 K/UL (ref 0.8–3.5)
LYMPHOCYTES NFR BLD: 29 % (ref 20–51)
MCH RBC QN AUTO: 29.5 PG (ref 24–34)
MCHC RBC AUTO-ENTMCNC: 32.8 G/DL (ref 31–37)
MCV RBC AUTO: 90.2 FL (ref 74–97)
MONOCYTES # BLD: 0.3 K/UL (ref 0–1)
MONOCYTES NFR BLD: 16 % (ref 2–9)
NEUTS SEG # BLD: 1.2 K/UL (ref 1.8–8)
NEUTS SEG NFR BLD: 54 % (ref 42–75)
PLATELET # BLD AUTO: 114 K/UL (ref 135–420)
PLATELET COMMENTS,PCOM: ABNORMAL
PMV BLD AUTO: 13 FL (ref 9.2–11.8)
POTASSIUM SERPL-SCNC: 4 MMOL/L (ref 3.5–5.5)
PROT SERPL-MCNC: 6.7 G/DL (ref 6.4–8.2)
PSA SERPL-MCNC: 4.4 NG/ML (ref 0–4)
RBC # BLD AUTO: 3.86 M/UL (ref 4.7–5.5)
RBC MORPH BLD: ABNORMAL
SODIUM SERPL-SCNC: 141 MMOL/L (ref 136–145)
WBC # BLD AUTO: 2.1 K/UL (ref 4.6–13.2)

## 2021-02-18 PROCEDURE — G0463 HOSPITAL OUTPT CLINIC VISIT: HCPCS | Performed by: INTERNAL MEDICINE

## 2021-02-18 PROCEDURE — 85025 COMPLETE CBC W/AUTO DIFF WBC: CPT

## 2021-02-18 PROCEDURE — 99214 OFFICE O/P EST MOD 30 MIN: CPT | Performed by: INTERNAL MEDICINE

## 2021-02-18 PROCEDURE — 84153 ASSAY OF PSA TOTAL: CPT

## 2021-02-18 PROCEDURE — 80053 COMPREHEN METABOLIC PANEL: CPT

## 2021-03-03 ENCOUNTER — APPOINTMENT (OUTPATIENT)
Dept: INFUSION THERAPY | Age: 81
End: 2021-03-03

## 2021-04-08 ENCOUNTER — LAB ONLY (OUTPATIENT)
Dept: ONCOLOGY | Age: 81
End: 2021-04-08

## 2021-04-08 ENCOUNTER — HOSPITAL ENCOUNTER (OUTPATIENT)
Dept: LAB | Age: 81
Discharge: HOME | End: 2021-04-08
Payer: MEDICARE

## 2021-04-08 DIAGNOSIS — C61 PROSTATE CANCER METASTATIC TO BONE (HCC): Primary | ICD-10-CM

## 2021-04-08 DIAGNOSIS — D50.8 IRON DEFICIENCY ANEMIA SECONDARY TO INADEQUATE DIETARY IRON INTAKE: ICD-10-CM

## 2021-04-08 DIAGNOSIS — D72.819 CHRONIC LEUKOPENIA: ICD-10-CM

## 2021-04-08 DIAGNOSIS — C61 PROSTATE CANCER (HCC): ICD-10-CM

## 2021-04-08 DIAGNOSIS — C61 PROSTATE CANCER METASTATIC TO BONE (HCC): ICD-10-CM

## 2021-04-08 DIAGNOSIS — D63.1 ANEMIA, CHRONIC RENAL FAILURE, STAGE 3 (MODERATE) (HCC): ICD-10-CM

## 2021-04-08 DIAGNOSIS — N18.30 ANEMIA, CHRONIC RENAL FAILURE, STAGE 3 (MODERATE) (HCC): ICD-10-CM

## 2021-04-08 DIAGNOSIS — C79.51 PROSTATE CANCER METASTATIC TO BONE (HCC): ICD-10-CM

## 2021-04-08 DIAGNOSIS — C79.51 BONE METASTASES (HCC): ICD-10-CM

## 2021-04-08 DIAGNOSIS — C79.51 PROSTATE CANCER METASTATIC TO BONE (HCC): Primary | ICD-10-CM

## 2021-04-08 LAB
ALBUMIN SERPL-MCNC: 3.6 G/DL (ref 3.4–5)
ALBUMIN/GLOB SERPL: 1.1 {RATIO} (ref 0.8–1.7)
ALP SERPL-CCNC: 110 U/L (ref 45–117)
ALT SERPL-CCNC: 39 U/L (ref 16–61)
ANION GAP SERPL CALC-SCNC: 5 MMOL/L (ref 3–18)
AST SERPL-CCNC: 31 U/L (ref 10–38)
BASOPHILS # BLD: 0 K/UL (ref 0–0.1)
BASOPHILS NFR BLD: 0 % (ref 0–2)
BILIRUB SERPL-MCNC: 0.6 MG/DL (ref 0.2–1)
BUN SERPL-MCNC: 32 MG/DL (ref 7–18)
BUN/CREAT SERPL: 30 (ref 12–20)
CALCIUM SERPL-MCNC: 9 MG/DL (ref 8.5–10.1)
CHLORIDE SERPL-SCNC: 109 MMOL/L (ref 100–111)
CO2 SERPL-SCNC: 29 MMOL/L (ref 21–32)
CREAT SERPL-MCNC: 1.07 MG/DL (ref 0.6–1.3)
DIFFERENTIAL METHOD BLD: ABNORMAL
EOSINOPHIL # BLD: 0.2 K/UL (ref 0–0.4)
EOSINOPHIL NFR BLD: 5 % (ref 0–5)
ERYTHROCYTE [DISTWIDTH] IN BLOOD BY AUTOMATED COUNT: 14.2 % (ref 11.6–14.5)
GLOBULIN SER CALC-MCNC: 3.3 G/DL (ref 2–4)
GLUCOSE SERPL-MCNC: 80 MG/DL (ref 74–99)
HCT VFR BLD AUTO: 35.4 % (ref 36–48)
HGB BLD-MCNC: 11.6 G/DL (ref 13–16)
LYMPHOCYTES # BLD: 1.1 K/UL (ref 0.9–3.6)
LYMPHOCYTES NFR BLD: 33 % (ref 21–52)
MCH RBC QN AUTO: 29.7 PG (ref 24–34)
MCHC RBC AUTO-ENTMCNC: 32.8 G/DL (ref 31–37)
MCV RBC AUTO: 90.8 FL (ref 74–97)
MONOCYTES # BLD: 0.7 K/UL (ref 0.05–1.2)
MONOCYTES NFR BLD: 21 % (ref 3–10)
NEUTS SEG # BLD: 1.3 K/UL (ref 1.8–8)
NEUTS SEG NFR BLD: 42 % (ref 40–73)
PLATELET # BLD AUTO: 113 K/UL (ref 135–420)
PMV BLD AUTO: 12.1 FL (ref 9.2–11.8)
POTASSIUM SERPL-SCNC: 3.8 MMOL/L (ref 3.5–5.5)
PROT SERPL-MCNC: 6.9 G/DL (ref 6.4–8.2)
PSA SERPL-MCNC: 4.8 NG/ML (ref 0–4)
RBC # BLD AUTO: 3.9 M/UL (ref 4.35–5.65)
SODIUM SERPL-SCNC: 143 MMOL/L (ref 136–145)
WBC # BLD AUTO: 3.2 K/UL (ref 4.6–13.2)

## 2021-04-08 PROCEDURE — 84153 ASSAY OF PSA TOTAL: CPT

## 2021-04-08 PROCEDURE — 85025 COMPLETE CBC W/AUTO DIFF WBC: CPT

## 2021-04-08 PROCEDURE — 80053 COMPREHEN METABOLIC PANEL: CPT

## 2021-04-22 ENCOUNTER — OFFICE VISIT (OUTPATIENT)
Dept: ONCOLOGY | Age: 81
End: 2021-04-22
Payer: MEDICARE

## 2021-04-22 VITALS
DIASTOLIC BLOOD PRESSURE: 55 MMHG | RESPIRATION RATE: 18 BRPM | BODY MASS INDEX: 25.25 KG/M2 | WEIGHT: 176.4 LBS | TEMPERATURE: 97.5 F | SYSTOLIC BLOOD PRESSURE: 148 MMHG | HEIGHT: 70 IN | HEART RATE: 59 BPM | OXYGEN SATURATION: 100 %

## 2021-04-22 DIAGNOSIS — C79.51 BONE METASTASES (HCC): ICD-10-CM

## 2021-04-22 DIAGNOSIS — C79.51 PROSTATE CANCER METASTATIC TO BONE (HCC): Primary | ICD-10-CM

## 2021-04-22 DIAGNOSIS — N18.30 ANEMIA, CHRONIC RENAL FAILURE, STAGE 3 (MODERATE) (HCC): ICD-10-CM

## 2021-04-22 DIAGNOSIS — D63.1 ANEMIA, CHRONIC RENAL FAILURE, STAGE 3 (MODERATE) (HCC): ICD-10-CM

## 2021-04-22 DIAGNOSIS — C61 PROSTATE CANCER METASTATIC TO BONE (HCC): Primary | ICD-10-CM

## 2021-04-22 DIAGNOSIS — C61 PROSTATE CANCER (HCC): ICD-10-CM

## 2021-04-22 PROCEDURE — 99214 OFFICE O/P EST MOD 30 MIN: CPT | Performed by: INTERNAL MEDICINE

## 2021-04-22 PROCEDURE — G0463 HOSPITAL OUTPT CLINIC VISIT: HCPCS | Performed by: INTERNAL MEDICINE

## 2021-04-22 NOTE — PROGRESS NOTES
Hematology/Oncology  Progress Note    Name: Nadeem Palacios  : 1940    PCP: Sen Allen MD     Mr. Vy Putnam is a 80 y.o. man who is here for reassessment of his anemia associated with chronic kidney disease, Prostate Cancer       Subjective:       Mr. Vy Putnam is a 31-year-old man who has history of prostate cancer and anemia associated with chronic kidney disease. Per chart review, he had an elevation in his PSA to 7 ng/mL then was referred for a bone scan on 2019. This test showed intense uptake involving the medial aspect of the right clavicle. Also there was increased uptake involving the lower thoracic spine at the level of T9 and T11. There was some concern that he may have a small pathologic fracture. With his history of prostate cancer and progressive PSA elevation he was suggested on Degarelix at a dose of 240 mg loading dose followed by 80 mg monthly. His daughter stated he declined systemic therapy. He had received homeopathic therapy for prostate cancer in [place] instead. He subsequently had stroke in 2020. He was very poor historian after stroke with mostly verbal response in yes or no. He has been working with speech therapy after stroke. He stated he did receive radiation to his brain mass previously. On 2/3/2021 C1 Degarelix and Xgeva. Today he is here for follow up. The patient was accompanied by her daughter. He reported no new symptoms. He has tolerated therapy well. The patient otherwise has no other complaints. Denied fever, chills, night sweat, unintentional weight loss, skin lumps or bumps, acute bleeding or bruising issues. Denied headache, acute vision change, dizziness, chest pain, worsen shortness of breath, palpitation, productive cough, nausea, vomiting, abdominal pain, altered bowel habits, dysuria, worsening bone pain or back pain, new focal numbness or weakness.      Past medical history, family history, and social history: these were reviewed and remains unchanged. Past Medical History:   Diagnosis Date    Elevated PSA     Hypercholesterolemia     Hypertension     Stroke (cerebrum) ([facility] [address]      History reviewed. No pertinent surgical history.   Social History     Socioeconomic History    Marital status: SINGLE     Spouse name: Not on file    Number of children: Not on file    Years of education: Not on file    Highest education level: Not on file   Occupational History    Not on file   Social Needs    Financial resource strain: Not on file    Food insecurity     Worry: Not on file     Inability: Not on file    Transportation needs     Medical: Not on file     Non-medical: Not on file   Tobacco Use    Smoking status: Never Smoker    Smokeless tobacco: Never Used   Substance and Sexual Activity    Alcohol use: No    Drug use: Not on file    Sexual activity: Not on file   Lifestyle    Physical activity     Days per week: Not on file     Minutes per session: Not on file    Stress: Not on file   Relationships    Social connections     Talks on phone: Not on file     Gets together: Not on file     Attends Mandaeism service: Not on file     Active member of club or organization: Not on file     Attends meetings of clubs or organizations: Not on file     Relationship status: Not on file    Intimate partner violence     Fear of current or ex partner: Not on file     Emotionally abused: Not on file     Physically abused: Not on file     Forced sexual activity: Not on file   Other Topics Concern    Not on file   Social History Narrative    Not on file     Family History   Problem Relation Age of Onset    Diabetes Mother     Hypertension Mother     Heart Disease Mother     Cancer Father     Heart Disease Brother     Diabetes Brother     Heart Disease Brother      Current Outpatient Medications   Medication Sig Dispense Refill    tamsulosin (FLOMAX) 0.4 mg capsule take 1 capsule by mouth every evening 90 Cap 0    bumetanide (BUMEX) 2 mg tablet Take 2 mg by mouth daily. 0    CARTIA  mg ER capsule   0    metFORMIN (GLUCOPHAGE) 500 mg tablet Take  by mouth two (2) times daily (with meals).  timolol (TIMOPTIC) 0.5 % ophthalmic solution 1 Drop two (2) times a day.  ELIQUIS 5 mg tablet   0    atorvastatin (LIPITOR) 40 mg tablet take 1 tablet by mouth at bedtime for cholesterol  0    fenofibrate micronized (LOFIBRA) 67 mg capsule take 1 capsule by mouth once daily  0    furosemide (LASIX) 20 mg tablet   0    levETIRAcetam (KEPPRA) 750 mg tablet take 1 tablet by mouth twice a day  0    carvedilol (COREG) 12.5 mg tablet Take  by mouth two (2) times daily (with meals).  losartan-hydrochlorothiazide (HYZAAR) 100-25 mg per tablet Take 1 Tab by mouth daily.  omeprazole (PRILOSEC) 10 mg capsule Take 10 mg by mouth daily. Review of Systems   Constitutional: Negative for chills, diaphoresis, fever, malaise/fatigue and weight loss. Respiratory: Negative for cough, hemoptysis, shortness of breath and wheezing. Cardiovascular: Negative for chest pain, palpitations and leg swelling. Gastrointestinal: Negative for abdominal pain, diarrhea, heartburn, nausea and vomiting. Genitourinary: Negative for dysuria, frequency, hematuria and urgency. Musculoskeletal: Negative for joint pain and myalgias. Skin: Negative for itching and rash. Neurological: Negative for dizziness, seizures, weakness and headaches. Psychiatric/Behavioral: Negative for depression. The patient does not have insomnia. Objective:     Visit Vitals  BP (!) 148/55 (BP 1 Location: Right arm, BP Patient Position: Sitting)   Pulse (!) 59   Temp 97.5 °F (36.4 °C) (Temporal)   Resp 18   Ht 5' 10\" (1.778 m)   Wt 80 kg (176 lb 6.4 oz)   SpO2 100%   BMI 25.31 kg/m²         Physical Exam  Constitutional:       General: He is not in acute distress. HENT:      Head: Normocephalic and atraumatic.    Eyes:      Pupils: Pupils are equal, round, and reactive to light. Neck:      Musculoskeletal: Neck supple. No neck rigidity. Cardiovascular:      Pulses: Normal pulses. Heart sounds: Normal heart sounds. No murmur. Pulmonary:      Effort: Pulmonary effort is normal. No respiratory distress. Breath sounds: Normal breath sounds. Abdominal:      General: Bowel sounds are normal. There is no distension. Palpations: Abdomen is soft. There is no mass. Tenderness: There is no abdominal tenderness. There is no guarding. Musculoskeletal:         General: No swelling or tenderness. Lymphadenopathy:      Cervical: No cervical adenopathy. Skin:     General: Skin is warm. Findings: No rash. Neurological:      Mental Status: He is alert and oriented to person, place, and time. Mental status is at baseline. Cranial Nerves: No cranial nerve deficit. Psychiatric:         Mood and Affect: Mood normal.          Diagnostics:      No results found for this or any previous visit (from the past 96 hour(s)). Imaging:  No results found for this or any previous visit. No results found for this or any previous visit. Results for orders placed in visit on 10/23/20   CT CHEST ABD PELV W CONT           Assessment:     1. Prostate cancer metastatic to bone ([address]    2. Prostate cancer ([address]    3. Bone metastases ([address]    4. Anemia, chronic renal failure, stage 3 (moderate) (HCC)      Plan:   # Prostate cancer  # Bone metastases:   -- 12/11/2018 showed that his prostate specific antigen was 7.27 ng/mL. -- Patient was being followed by Dr. Evangelist Bradley who just retired. A follow-up PSA and and a bone scan was schedule and the result showed intense uptake involving the medial aspect of the right clavicle. Also there was increased uptake involving the lower thoracic spine at the level of T9 and T11.    --The MRI of the thoracic spine shows correlating with abnormal uptake on prior whole body bone scan of 4/12/2019, there are acute/subacute fractures are seen within T9 and T11 vertebral bodies, both of which are almost certainly pathologic given foci of suspicious enhancement. Additional osseous metastatic lesion within left paracentral T2 vertebral body. Multilevel degenerative disc disease, to include ventral thoracic cord impingement at T2-T3 and T6-T7 vertebral body levels. + The MRI of the lumber spine also shows no fracture nor osseous metastatic disease within the lumbar spine or visualized osseous pelvis. Only partially visualized on this exam is the T11 vertebral body pathologic fracture, better evaluated on concurrent thoracic spine MRI. Severe right foraminal stenosis at L3-L4 with impingement of right L3 foraminal nerve root by prominent focal right foraminal disc bulge. Moderate bilateral foraminal stenoses at L5-S1, each with abutment of respective L5 foraminal nerve roots by adjacent foraminal disc. Moderate L3-L4 and mild L4-L5 canal stenoses. -- 4/2019 He was suggested on Degarelix at a dose of 240 mg loading dose followed by 80 mg monthly. He declined systemic chemotherapy. Subsequently he had received homeopathic therapy for prostate cancer in [place] instead. -- He had stroke in March 2020. He was poor historian after stroke with mostly verbal response only in yes or no. He has been working with speech therapy after stroke. -- 7/14/2020 PSA was elevated from 3.7 to 7.5  -- 9/28/2020 CT C/A/P reported no findings to suggest interval progression of metastatic disease.  -- 9/15/2020 Bone Scan reported persistent moderate to intense activity involving the medial aspect of both clavicular heads. Less intense uptake involving the right anterior fourth rib as well as T9 and T11 vertebra. No new foci of uptake is identified to strongly suggest progression of osseous metastatic disease.   -- 11/5/2020 PSA was 7.4  -- He has seen Dr. Sid Reddy who did not see any current role for radiation as his urinary function is excellent and he has metastatic disease that is asymptomatic. He was agreeable with re-initiation of ADT given his excellent response at pervious time. He continues to decline chemotherapy. -- 2/3/2021 C1 Degarelix and Xgeva at [facility]. He has tolerated therapy without high graded toxicities. Recent labs reviewed with the patient and family, 4/8/2021 PSA 4.8. Plan:  -- He will continue Degarelix and Xgeva injection at [facility] as scheduled. Kena Warner -- Monitor labs: CBC, CMP, PSA    Degarelix  Day 1: Degarelix 240mg in one 4-week cycle, followed by:  Day 1: Degarelix 80mg (administered as 1 subcutaneous 4mL injection). Repeat cycle every 4 weeks. # Anemia associated with chronic renal failure, stage III:   -- The patient has been scheduled to receive Procrit therapy whenever his hemoglobin is below 10 g/dL and hematocrit is below 30%. -- Recent CBC and CMP reviewed. We will repeat CBC periodically    -- We will see the patient back in clinic in about 2 months. Always sooner if required. No orders of the defined types were placed in this encounter. Mr. Juan José Stewrat has a reminder for a \"due or due soon\" health maintenance. I have asked that he contact his primary care provider for follow-up on this health maintenance. All of patient's questions answered to their apparent satisfaction. They verbally show understanding and agreement with aforementioned plan. Alisa Upton MD  4/22/2021          About 25 minutes were spent for this encounter with more than 50% of the time spent in face-to-face counseling, discussing on diagnosis and management plan going forward, and co-ordination of care. Parts of this document has been produced using Dragon dictation system. Unrecognized errors in transcription may be present. Please do not hesitate to reach out for any questions or clarifications.       CC: Hiram Pinedo MD

## 2021-06-18 NOTE — PROGRESS NOTES
Hematology/Oncology  Progress Note    Name: Regi Bertrand  : 1940    PCP: Nadia Ramirez MD     Mr. Gilford Heady is a 80 y.o. man who is here for reassessment of his anemia associated with chronic kidney disease, Prostate Cancer       Subjective:       Mr. Gilford Heady is a 28-year-old man who has history of prostate cancer and anemia associated with chronic kidney disease. Per chart review, he had an elevation in his PSA to 7 ng/mL then was referred for a bone scan on 2019. This test showed intense uptake involving the medial aspect of the right clavicle. Also there was increased uptake involving the lower thoracic spine at the level of T9 and T11. There was some concern that he may have a small pathologic fracture. With his history of prostate cancer and progressive PSA elevation he was suggested on Degarelix at a dose of 240 mg loading dose followed by 80 mg monthly. His daughter stated he declined systemic therapy. He had received homeopathic therapy for prostate cancer in [place] instead. He subsequently had stroke in 2020. He was very poor historian after stroke with mostly verbal response in yes or no. He has been working with speech therapy after stroke. He stated he did receive radiation to his brain mass previously. On 2/3/2021 C1 Degarelix and Xgeva. Today the patient was here for follow up. The patient was accompanied by her daughter. He reported feeling well overall. He denied any new symptoms. He has tolerated therapy well. The patient otherwise has no other complaints. Denied fever, chills, night sweat, unintentional weight loss, skin lumps or bumps, acute bleeding or bruising issues. Denied headache, acute vision change, dizziness, chest pain, worsen shortness of breath, palpitation, productive cough, nausea, vomiting, abdominal pain, altered bowel habits, dysuria, worsening bone pain or back pain, new focal numbness or weakness.      Past medical history, family history, and social history: these were reviewed and remains unchanged. Past Medical History:   Diagnosis Date    Elevated PSA     Hypercholesterolemia     Hypertension     Stroke (cerebrum) ([facility] [address]      History reviewed. No pertinent surgical history. Social History     Socioeconomic History    Marital status: SINGLE     Spouse name: Not on file    Number of children: Not on file    Years of education: Not on file    Highest education level: Not on file   Occupational History    Not on file   Tobacco Use    Smoking status: Never Smoker    Smokeless tobacco: Never Used   Substance and Sexual Activity    Alcohol use: No    Drug use: Not on file    Sexual activity: Not on file   Other Topics Concern    Not on file   Social History Narrative    Not on file     Social Determinants of Health     Financial Resource Strain:     Difficulty of Paying Living Expenses:    Food Insecurity:     Worried About Running Out of Food in the Last Year:     [address] in the Last Year:    Transportation Needs:     Lack of Transportation (Medical):      Lack of Transportation (Non-Medical):    Physical Activity:     Days of Exercise per Week:     Minutes of Exercise per Session:    Stress:     Feeling of Stress :    Social Connections:     Frequency of Communication with Friends and Family:     Frequency of Social Gatherings with Friends and Family:     Attends [employer] Services:     Active Member of Clubs or Organizations:     Attends Club or Organization Meetings:     Marital Status:    Intimate Partner Violence:     Fear of Current or Ex-Partner:     Emotionally Abused:     Physically Abused:     Sexually Abused:      Family History   Problem Relation Age of Onset    Diabetes Mother     Hypertension Mother     Heart Disease Mother     Cancer Father     Heart Disease Brother     Diabetes Brother     Heart Disease Brother      Current Outpatient Medications   Medication Sig Dispense Refill  tamsulosin (FLOMAX) 0.4 mg capsule take 1 capsule by mouth every evening 90 Cap 0    bumetanide (BUMEX) 2 mg tablet Take 2 mg by mouth daily. 0    CARTIA  mg ER capsule   0    metFORMIN (GLUCOPHAGE) 500 mg tablet Take  by mouth two (2) times daily (with meals).  timolol (TIMOPTIC) 0.5 % ophthalmic solution 1 Drop two (2) times a day.  ELIQUIS 5 mg tablet   0    atorvastatin (LIPITOR) 40 mg tablet take 1 tablet by mouth at bedtime for cholesterol  0    fenofibrate micronized (LOFIBRA) 67 mg capsule take 1 capsule by mouth once daily  0    furosemide (LASIX) 20 mg tablet   0    levETIRAcetam (KEPPRA) 750 mg tablet take 1 tablet by mouth twice a day  0    carvedilol (COREG) 12.5 mg tablet Take  by mouth two (2) times daily (with meals).  losartan-hydrochlorothiazide (HYZAAR) 100-25 mg per tablet Take 1 Tab by mouth daily.  omeprazole (PRILOSEC) 10 mg capsule Take 10 mg by mouth daily. Review of Systems   Constitutional: Negative for chills, diaphoresis, fever, malaise/fatigue and weight loss. Respiratory: Negative for cough, hemoptysis, shortness of breath and wheezing. Cardiovascular: Negative for chest pain, palpitations and leg swelling. Gastrointestinal: Negative for abdominal pain, diarrhea, heartburn, nausea and vomiting. Genitourinary: Negative for dysuria, frequency, hematuria and urgency. Musculoskeletal: Negative for joint pain and myalgias. Skin: Negative for itching and rash. Neurological: Negative for dizziness, seizures, weakness and headaches. Psychiatric/Behavioral: Negative for depression. The patient does not have insomnia.              Objective:     Visit Vitals  /74 (BP 1 Location: Left upper arm, BP Patient Position: Sitting)   Pulse 60   Temp 97.7 °F (36.5 °C) (Temporal)   Resp 20   Ht 5' 10\" (1.778 m)   Wt 82.7 kg (182 lb 6.4 oz)   SpO2 98%   BMI 26.17 kg/m²         Physical Exam  Constitutional:       General: He is not in acute distress. HENT:      Head: Normocephalic and atraumatic. Eyes:      Pupils: Pupils are equal, round, and reactive to light. Cardiovascular:      Pulses: Normal pulses. Heart sounds: Normal heart sounds. No murmur heard. Pulmonary:      Effort: Pulmonary effort is normal. No respiratory distress. Breath sounds: Normal breath sounds. Abdominal:      General: Bowel sounds are normal. There is no distension. Palpations: Abdomen is soft. There is no mass. Tenderness: There is no abdominal tenderness. There is no guarding. Musculoskeletal:         General: No swelling or tenderness. Cervical back: Neck supple. No rigidity. Lymphadenopathy:      Cervical: No cervical adenopathy. Skin:     General: Skin is warm. Findings: No rash. Neurological:      Mental Status: He is alert and oriented to person, place, and time. Mental status is at baseline. Cranial Nerves: No cranial nerve deficit. Psychiatric:         Mood and Affect: Mood normal.          Diagnostics:      No results found for this or any previous visit (from the past 96 hour(s)). Imaging:  No results found for this or any previous visit. No results found for this or any previous visit. Results for orders placed in visit on 10/23/20    CT CHEST ABD PELV W CONT          Assessment:     1. Prostate cancer metastatic to bone ([address]    2. Prostate cancer ([address]    3. Bone metastases ([address]    4. Anemia, chronic renal failure, stage 3 (moderate) (HCC)    5. 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      Plan:   # Prostate cancer  # Bone metastases:   -- 12/11/2018 showed that his prostate specific antigen was 7.27 ng/mL. -- Patient was being followed by Dr. Sid Wang who just retired. A follow-up PSA and and a bone scan was schedule and the result showed intense uptake involving the medial aspect of the right clavicle.   Also there was increased uptake involving the lower thoracic spine at the level of T9 and T11. --The MRI of the thoracic spine shows correlating with abnormal uptake on prior whole body bone scan of 4/12/2019, there are acute/subacute fractures are seen within T9 and T11 vertebral bodies, both of which are almost certainly pathologic given foci of suspicious enhancement. Additional osseous metastatic lesion within left paracentral T2 vertebral body. Multilevel degenerative disc disease, to include ventral thoracic cord impingement at T2-T3 and T6-T7 vertebral body levels. + The MRI of the lumber spine also shows no fracture nor osseous metastatic disease within the lumbar spine or visualized osseous pelvis. Only partially visualized on this exam is the T11 vertebral body pathologic fracture, better evaluated on concurrent thoracic spine MRI. Severe right foraminal stenosis at L3-L4 with impingement of right L3 foraminal nerve root by prominent focal right foraminal disc bulge. Moderate bilateral foraminal stenoses at L5-S1, each with abutment of respective L5 foraminal nerve roots by adjacent foraminal disc. Moderate L3-L4 and mild L4-L5 canal stenoses. -- 4/2019 He was suggested on Degarelix at a dose of 240 mg loading dose followed by 80 mg monthly. He declined systemic chemotherapy. Subsequently he had received homeopathic therapy for prostate cancer in [place] instead. -- He had stroke in March 2020. He was poor historian after stroke with mostly verbal response only in yes or no. He has been working with speech therapy after stroke. -- 7/14/2020 PSA was elevated from 3.7 to 7.5  -- 9/28/2020 CT C/A/P reported no findings to suggest interval progression of metastatic disease.  -- 9/15/2020 Bone Scan reported persistent moderate to intense activity involving the medial aspect of both clavicular heads. Less intense uptake involving the right anterior fourth rib as well as T9 and T11 vertebra.  No new foci of uptake is identified to strongly suggest progression of osseous metastatic disease. -- 11/5/2020 PSA was 7.4  -- He has seen Dr. April Lyon who did not see any current role for radiation as his urinary function is excellent and he has metastatic disease that is asymptomatic. He was agreeable with re-initiation of ADT given his excellent response at pervious time. He continues to decline chemotherapy. -- 2/3/2021 C1 Degarelix and Xgeva at [facility]. He has tolerated therapy without high graded toxicities. Clinical stable. Recent labs reviewed with the patient and family, 4/8/2021 PSA 4.8. We have discussed about potential addition of anti-androgens or combined androgen blockade as next options of therapy. Plan:  -- He will continue Degarelix and Xgeva injection at [facility] as scheduled. Ulus Reusing -- Monitor labs: CBC, CMP, PSA/Testosterone today. Degarelix  Day 1: Degarelix 240mg in one 4-week cycle, followed by:  Day 1: Degarelix 80mg (administered as 1 subcutaneous 4mL injection). Repeat cycle every 4 weeks. # Anemia associated with chronic renal failure, stage III:  # Hx Iron deficiency anemia   -- The patient has been scheduled to receive Procrit therapy whenever his hemoglobin is below 10 g/dL and hematocrit is below 30%. -- Recent CBC and CMP reviewed. We will repeat CBC, Iron profiles. /ferritin periodically    -- We will see the patient back in clinic in about 2 months. Always sooner if required.       Orders Placed This Encounter    METABOLIC PANEL, COMPREHENSIVE     Standing Status:   Future     Standing Expiration Date:   6/23/2022    CBC WITH AUTOMATED DIFF     Standing Status:   Future     Standing Expiration Date:   6/23/2022    IRON PROFILE     Standing Status:   Future     Standing Expiration Date:   6/23/2022    FERRITIN     Standing Status:   Future     Standing Expiration Date:   6/22/2022    PROSTATE SPECIFIC AG     Standing Status:   Future     Standing Expiration Date:   6/22/2022    TESTOSTERONE, TOTAL, ADULT MALE Standing Status:   Future     Standing Expiration Date:   6/23/2022           Mr. Svitlana Olivera has a reminder for a \"due or due soon\" health maintenance. I have asked that he contact his primary care provider for follow-up on this health maintenance. All of patient's questions answered to their apparent satisfaction. They verbally show understanding and agreement with aforementioned plan. Yolanda Collazo MD  6/22/2021          About 30 minutes were spent for this encounter with more than 50% of the time spent in face-to-face counseling, discussing on diagnosis and management plan going forward, and co-ordination of care. Parts of this document has been produced using Dragon dictation system. Unrecognized errors in transcription may be present. Please do not hesitate to reach out for any questions or clarifications.       CC: Ann Armstrong MD

## 2021-06-22 ENCOUNTER — OFFICE VISIT (OUTPATIENT)
Dept: ONCOLOGY | Age: 81
End: 2021-06-22
Payer: MEDICARE

## 2021-06-22 ENCOUNTER — HOSPITAL ENCOUNTER (OUTPATIENT)
Dept: LAB | Age: 81
Discharge: HOME | End: 2021-06-22
Payer: MEDICARE

## 2021-06-22 VITALS
BODY MASS INDEX: 26.11 KG/M2 | RESPIRATION RATE: 20 BRPM | HEIGHT: 70 IN | DIASTOLIC BLOOD PRESSURE: 74 MMHG | SYSTOLIC BLOOD PRESSURE: 122 MMHG | OXYGEN SATURATION: 98 % | HEART RATE: 60 BPM | TEMPERATURE: 97.7 F | WEIGHT: 182.4 LBS

## 2021-06-22 DIAGNOSIS — D50.8 IRON DEFICIENCY ANEMIA SECONDARY TO INADEQUATE DIETARY IRON INTAKE: ICD-10-CM

## 2021-06-22 DIAGNOSIS — N18.30 ANEMIA, CHRONIC RENAL FAILURE, STAGE 3 (MODERATE) (HCC): ICD-10-CM

## 2021-06-22 DIAGNOSIS — C79.51 PROSTATE CANCER METASTATIC TO BONE (HCC): Primary | ICD-10-CM

## 2021-06-22 DIAGNOSIS — C61 PROSTATE CANCER (HCC): ICD-10-CM

## 2021-06-22 DIAGNOSIS — D63.1 ANEMIA, CHRONIC RENAL FAILURE, STAGE 3 (MODERATE) (HCC): ICD-10-CM

## 2021-06-22 DIAGNOSIS — C79.51 BONE METASTASES (HCC): ICD-10-CM

## 2021-06-22 DIAGNOSIS — C61 PROSTATE CANCER METASTATIC TO BONE (HCC): Primary | ICD-10-CM

## 2021-06-22 LAB
ALBUMIN SERPL-MCNC: 3.6 G/DL (ref 3.4–5)
ALBUMIN/GLOB SERPL: 1.3 {RATIO} (ref 0.8–1.7)
ALP SERPL-CCNC: 102 U/L (ref 45–117)
ALT SERPL-CCNC: 64 U/L (ref 16–61)
ANION GAP SERPL CALC-SCNC: 5 MMOL/L (ref 3–18)
AST SERPL-CCNC: 40 U/L (ref 10–38)
BASOPHILS # BLD: 0 K/UL (ref 0–0.1)
BASOPHILS NFR BLD: 0 % (ref 0–2)
BILIRUB SERPL-MCNC: 0.6 MG/DL (ref 0.2–1)
BUN SERPL-MCNC: 21 MG/DL (ref 7–18)
BUN/CREAT SERPL: 22 (ref 12–20)
CALCIUM SERPL-MCNC: 8.3 MG/DL (ref 8.5–10.1)
CHLORIDE SERPL-SCNC: 111 MMOL/L (ref 100–111)
CO2 SERPL-SCNC: 26 MMOL/L (ref 21–32)
CREAT SERPL-MCNC: 0.96 MG/DL (ref 0.6–1.3)
DIFFERENTIAL METHOD BLD: ABNORMAL
EOSINOPHIL # BLD: 0.1 K/UL (ref 0–0.4)
EOSINOPHIL NFR BLD: 2 % (ref 0–5)
ERYTHROCYTE [DISTWIDTH] IN BLOOD BY AUTOMATED COUNT: 14.8 % (ref 11.6–14.5)
FERRITIN SERPL-MCNC: 369 NG/ML (ref 8–388)
GLOBULIN SER CALC-MCNC: 2.8 G/DL (ref 2–4)
GLUCOSE SERPL-MCNC: 114 MG/DL (ref 74–99)
HCT VFR BLD AUTO: 32.5 % (ref 36–48)
HGB BLD-MCNC: 10.3 G/DL (ref 13–16)
IRON SATN MFR SERPL: 26 % (ref 20–50)
IRON SERPL-MCNC: 51 UG/DL (ref 50–175)
LYMPHOCYTES # BLD: 0.8 K/UL (ref 0.9–3.6)
LYMPHOCYTES NFR BLD: 28 % (ref 21–52)
MCH RBC QN AUTO: 29.4 PG (ref 24–34)
MCHC RBC AUTO-ENTMCNC: 31.7 G/DL (ref 31–37)
MCV RBC AUTO: 92.9 FL (ref 74–97)
MONOCYTES # BLD: 0.3 K/UL (ref 0.05–1.2)
MONOCYTES NFR BLD: 11 % (ref 3–10)
NEUTS SEG # BLD: 1.6 K/UL (ref 1.8–8)
NEUTS SEG NFR BLD: 58 % (ref 40–73)
PLATELET # BLD AUTO: 78 K/UL (ref 135–420)
PMV BLD AUTO: 12.7 FL (ref 9.2–11.8)
POTASSIUM SERPL-SCNC: 4 MMOL/L (ref 3.5–5.5)
PROT SERPL-MCNC: 6.4 G/DL (ref 6.4–8.2)
PSA SERPL-MCNC: 4.2 NG/ML (ref 0–4)
RBC # BLD AUTO: 3.5 M/UL (ref 4.35–5.65)
SODIUM SERPL-SCNC: 142 MMOL/L (ref 136–145)
TIBC SERPL-MCNC: 200 UG/DL (ref 250–450)
WBC # BLD AUTO: 2.7 K/UL (ref 4.6–13.2)

## 2021-06-22 PROCEDURE — 82728 ASSAY OF FERRITIN: CPT

## 2021-06-22 PROCEDURE — G0463 HOSPITAL OUTPT CLINIC VISIT: HCPCS | Performed by: INTERNAL MEDICINE

## 2021-06-22 PROCEDURE — 84153 ASSAY OF PSA TOTAL: CPT

## 2021-06-22 PROCEDURE — 80053 COMPREHEN METABOLIC PANEL: CPT

## 2021-06-22 PROCEDURE — 84403 ASSAY OF TOTAL TESTOSTERONE: CPT

## 2021-06-22 PROCEDURE — 83550 IRON BINDING TEST: CPT

## 2021-06-22 PROCEDURE — 85025 COMPLETE CBC W/AUTO DIFF WBC: CPT

## 2021-06-22 PROCEDURE — 99214 OFFICE O/P EST MOD 30 MIN: CPT | Performed by: INTERNAL MEDICINE

## 2021-06-22 NOTE — PROGRESS NOTES
Nguyenradha Elza presents today for   Chief Complaint   Patient presents with    Follow-up       Is someone accompanying this pt? Yes, Jabari Lawrence    Is the patient using any DME equipment during OV? Yes, cane    Coordination of Care:  1. Have you been to the ER, urgent care clinic since your last visit? Hospitalized since your last visit? no    2. Have you seen or consulted any other health care providers outside of the [address] [place] [address] since your last visit? Include any pap smears or colon screening.  no

## 2021-06-23 LAB
COMMENT, TESC2: ABNORMAL
TESTOST SERPL-MCNC: 9 NG/DL (ref 264–916)

## 2021-06-30 ENCOUNTER — TELEPHONE (OUTPATIENT)
Dept: ONCOLOGY | Age: 81
End: 2021-06-30

## 2021-06-30 NOTE — TELEPHONE ENCOUNTER
Ms. Yun Foss called on behalf of her father requesting to have urine test order sent to [facility]. Monday patients urine was a alhaji orange color by Tuesday started clearing up. Patient drinks water and coffee sometimes. Not sure if this is from the shot he received on June 25th. Ms Lindquist doesn't remember fathers urine having  discoloration anytime prior. Ms. Raegan Murguia can be reached at work [phone] will be answered [facility] ask for Thedocyrus Lindquist.

## 2021-07-01 DIAGNOSIS — N18.30 ANEMIA, CHRONIC RENAL FAILURE, STAGE 3 (MODERATE) (HCC): ICD-10-CM

## 2021-07-01 DIAGNOSIS — R39.89 URINE DISCOLORATION: Primary | ICD-10-CM

## 2021-07-01 DIAGNOSIS — D63.1 ANEMIA, CHRONIC RENAL FAILURE, STAGE 3 (MODERATE) (HCC): ICD-10-CM

## 2021-08-17 ENCOUNTER — HOSPITAL ENCOUNTER (OUTPATIENT)
Dept: LAB | Age: 81
Discharge: HOME | End: 2021-08-17
Payer: MEDICARE

## 2021-08-17 ENCOUNTER — LAB ONLY (OUTPATIENT)
Dept: ONCOLOGY | Age: 81
End: 2021-08-17

## 2021-08-17 DIAGNOSIS — D50.8 IRON DEFICIENCY ANEMIA SECONDARY TO INADEQUATE DIETARY IRON INTAKE: ICD-10-CM

## 2021-08-17 DIAGNOSIS — C79.51 PROSTATE CANCER METASTATIC TO BONE (HCC): Primary | ICD-10-CM

## 2021-08-17 DIAGNOSIS — N18.30 ANEMIA, CHRONIC RENAL FAILURE, STAGE 3 (MODERATE) (HCC): ICD-10-CM

## 2021-08-17 DIAGNOSIS — D63.1 ANEMIA, CHRONIC RENAL FAILURE, STAGE 3 (MODERATE) (HCC): ICD-10-CM

## 2021-08-17 DIAGNOSIS — C61 PROSTATE CANCER METASTATIC TO BONE (HCC): Primary | ICD-10-CM

## 2021-08-17 LAB
ALBUMIN SERPL-MCNC: 3.4 G/DL (ref 3.4–5)
ALBUMIN/GLOB SERPL: 1.1 {RATIO} (ref 0.8–1.7)
ALP SERPL-CCNC: 124 U/L (ref 45–117)
ALT SERPL-CCNC: 51 U/L (ref 16–61)
ANION GAP SERPL CALC-SCNC: 4 MMOL/L (ref 3–18)
AST SERPL-CCNC: 37 U/L (ref 10–38)
BASOPHILS # BLD: 0 K/UL (ref 0–0.1)
BASOPHILS NFR BLD: 0 % (ref 0–2)
BILIRUB SERPL-MCNC: 0.6 MG/DL (ref 0.2–1)
BUN SERPL-MCNC: 25 MG/DL (ref 7–18)
BUN/CREAT SERPL: 21 (ref 12–20)
CALCIUM SERPL-MCNC: 8.4 MG/DL (ref 8.5–10.1)
CHLORIDE SERPL-SCNC: 112 MMOL/L (ref 100–111)
CO2 SERPL-SCNC: 27 MMOL/L (ref 21–32)
CREAT SERPL-MCNC: 1.19 MG/DL (ref 0.6–1.3)
DIFFERENTIAL METHOD BLD: ABNORMAL
EOSINOPHIL # BLD: 0 K/UL (ref 0–0.4)
EOSINOPHIL NFR BLD: 1 % (ref 0–5)
ERYTHROCYTE [DISTWIDTH] IN BLOOD BY AUTOMATED COUNT: 13.8 % (ref 11.6–14.5)
FERRITIN SERPL-MCNC: 474 NG/ML (ref 8–388)
GLOBULIN SER CALC-MCNC: 3.2 G/DL (ref 2–4)
GLUCOSE SERPL-MCNC: 117 MG/DL (ref 74–99)
HCT VFR BLD AUTO: 33.9 % (ref 36–48)
HGB BLD-MCNC: 10.9 G/DL (ref 13–16)
IRON SATN MFR SERPL: 19 % (ref 20–50)
IRON SERPL-MCNC: 34 UG/DL (ref 50–175)
LYMPHOCYTES # BLD: 1 K/UL (ref 0.9–3.6)
LYMPHOCYTES NFR BLD: 27 % (ref 21–52)
MCH RBC QN AUTO: 29.7 PG (ref 24–34)
MCHC RBC AUTO-ENTMCNC: 32.2 G/DL (ref 31–37)
MCV RBC AUTO: 92.4 FL (ref 74–97)
MONOCYTES # BLD: 0.6 K/UL (ref 0.05–1.2)
MONOCYTES NFR BLD: 16 % (ref 3–10)
NEUTS SEG # BLD: 2 K/UL (ref 1.8–8)
NEUTS SEG NFR BLD: 55 % (ref 40–73)
PLATELET # BLD AUTO: 105 K/UL (ref 135–420)
PMV BLD AUTO: 12.8 FL (ref 9.2–11.8)
POTASSIUM SERPL-SCNC: 4 MMOL/L (ref 3.5–5.5)
PROT SERPL-MCNC: 6.6 G/DL (ref 6.4–8.2)
PSA SERPL-MCNC: 6.6 NG/ML (ref 0–4)
RBC # BLD AUTO: 3.67 M/UL (ref 4.35–5.65)
SODIUM SERPL-SCNC: 143 MMOL/L (ref 136–145)
TIBC SERPL-MCNC: 183 UG/DL (ref 250–450)
WBC # BLD AUTO: 3.7 K/UL (ref 4.6–13.2)

## 2021-08-17 PROCEDURE — 80053 COMPREHEN METABOLIC PANEL: CPT

## 2021-08-17 PROCEDURE — 83550 IRON BINDING TEST: CPT

## 2021-08-17 PROCEDURE — 84153 ASSAY OF PSA TOTAL: CPT

## 2021-08-17 PROCEDURE — 36415 COLL VENOUS BLD VENIPUNCTURE: CPT

## 2021-08-17 PROCEDURE — 82728 ASSAY OF FERRITIN: CPT

## 2021-08-17 PROCEDURE — 85025 COMPLETE CBC W/AUTO DIFF WBC: CPT

## 2021-08-23 NOTE — PROGRESS NOTES
Hematology/Oncology  Progress Note    Name: Darline Leija  : 1940    PCP: Xu Campbell MD     Mr. Timo Vo is a 80 y.o. man who is here for reassessment of his anemia associated with chronic kidney disease, Prostate Cancer       Subjective:       Mr. Timo Vo is a 79-year-old man who has history of prostate cancer and anemia associated with chronic kidney disease. Per chart review, he had an elevation in his PSA to 7 ng/mL then was referred for a bone scan on 2019. This test showed intense uptake involving the medial aspect of the right clavicle. Also there was increased uptake involving the lower thoracic spine at the level of T9 and T11. There was some concern that he may have a small pathologic fracture. With his history of prostate cancer and progressive PSA elevation he was suggested on Degarelix at a dose of 240 mg loading dose followed by 80 mg monthly. His daughter stated he declined systemic therapy. He had received homeopathic therapy for prostate cancer in [place] instead. He subsequently had stroke in 2020. He was very poor historian after stroke with mostly verbal response in yes or no. He has been working with speech therapy after stroke. He stated he did receive radiation to his brain mass previously. On 2/3/2021 C1 Degarelix and Xgeva. Today the patient was here for follow up. The patient was accompanied by his family. He reported both knees hurting which though related to osteoarthritis. He denied any new symptoms. He has tolerated therapy well. The patient otherwise has no other complaints. Denied fever, chills, night sweat, unintentional weight loss, skin lumps or bumps, acute bleeding or bruising issues. Denied headache, acute vision change, dizziness, chest pain, worsen shortness of breath, palpitation, productive cough, nausea, vomiting, abdominal pain, altered bowel habits, dysuria, worsening bone pain or back pain, new focal numbness or weakness. Past medical history, family history, and social history: these were reviewed and remains unchanged. Past Medical History:   Diagnosis Date    Elevated PSA     Hypercholesterolemia     Hypertension     Stroke (cerebrum) ([facility] [address]      History reviewed. No pertinent surgical history. Social History     Socioeconomic History    Marital status: SINGLE     Spouse name: Not on file    Number of children: Not on file    Years of education: Not on file    Highest education level: Not on file   Occupational History    Not on file   Tobacco Use    Smoking status: Never Smoker    Smokeless tobacco: Never Used   Substance and Sexual Activity    Alcohol use: No    Drug use: Not on file    Sexual activity: Not on file   Other Topics Concern    Not on file   Social History Narrative    Not on file     Social Determinants of Health     Financial Resource Strain:     Difficulty of Paying Living Expenses:    Food Insecurity:     Worried About Running Out of Food in the Last Year:     [address] in the Last Year:    Transportation Needs:     Lack of Transportation (Medical):      Lack of Transportation (Non-Medical):    Physical Activity:     Days of Exercise per Week:     Minutes of Exercise per Session:    Stress:     Feeling of Stress :    Social Connections:     Frequency of Communication with Friends and Family:     Frequency of Social Gatherings with Friends and Family:     Attends [employer] Services:     Active Member of Clubs or Organizations:     Attends Club or Organization Meetings:     Marital Status:    Intimate Partner Violence:     Fear of Current or Ex-Partner:     Emotionally Abused:     Physically Abused:     Sexually Abused:      Family History   Problem Relation Age of Onset    Diabetes Mother     Hypertension Mother     Heart Disease Mother     Cancer Father     Heart Disease Brother     Diabetes Brother     Heart Disease Brother      Current Outpatient Medications   Medication Sig Dispense Refill    tamsulosin (FLOMAX) 0.4 mg capsule take 1 capsule by mouth every evening 90 Cap 0    bumetanide (BUMEX) 2 mg tablet Take 2 mg by mouth daily. 0    CARTIA  mg ER capsule   0    metFORMIN (GLUCOPHAGE) 500 mg tablet Take  by mouth two (2) times daily (with meals).  timolol (TIMOPTIC) 0.5 % ophthalmic solution 1 Drop two (2) times a day.  ELIQUIS 5 mg tablet   0    atorvastatin (LIPITOR) 40 mg tablet take 1 tablet by mouth at bedtime for cholesterol  0    fenofibrate micronized (LOFIBRA) 67 mg capsule take 1 capsule by mouth once daily  0    furosemide (LASIX) 20 mg tablet   0    levETIRAcetam (KEPPRA) 750 mg tablet take 1 tablet by mouth twice a day  0    carvedilol (COREG) 12.5 mg tablet Take  by mouth two (2) times daily (with meals).  losartan-hydrochlorothiazide (HYZAAR) 100-25 mg per tablet Take 1 Tab by mouth daily.  omeprazole (PRILOSEC) 10 mg capsule Take 10 mg by mouth daily. Review of Systems   Constitutional: Negative for chills, diaphoresis, fever, malaise/fatigue and weight loss. Respiratory: Negative for cough, hemoptysis, shortness of breath and wheezing. Cardiovascular: Negative for chest pain, palpitations and leg swelling. Gastrointestinal: Negative for abdominal pain, diarrhea, heartburn, nausea and vomiting. Genitourinary: Negative for dysuria, frequency, hematuria and urgency. Musculoskeletal: Negative for joint pain and myalgias. Skin: Negative for itching and rash. Neurological: Negative for dizziness, seizures, weakness and headaches. Psychiatric/Behavioral: Negative for depression. The patient does not have insomnia.              Objective:     Visit Vitals  /72 (BP 1 Location: Left upper arm, BP Patient Position: Sitting)   Pulse 78   Temp 98.7 °F (37.1 °C) (Temporal)   Resp 14   Ht 5' 10\" (1.778 m)   Wt 80.1 kg (176 lb 9.6 oz)   SpO2 98%   BMI 25.34 kg/m²         Physical Exam  Constitutional:       General: He is not in acute distress. HENT:      Head: Normocephalic and atraumatic. Eyes:      Pupils: Pupils are equal, round, and reactive to light. Cardiovascular:      Pulses: Normal pulses. Heart sounds: Normal heart sounds. No murmur heard. Pulmonary:      Effort: Pulmonary effort is normal. No respiratory distress. Breath sounds: Normal breath sounds. Abdominal:      General: Bowel sounds are normal. There is no distension. Palpations: Abdomen is soft. There is no mass. Tenderness: There is no abdominal tenderness. There is no guarding. Musculoskeletal:         General: No swelling or tenderness. Cervical back: Neck supple. No rigidity. Lymphadenopathy:      Cervical: No cervical adenopathy. Skin:     General: Skin is warm. Findings: No rash. Neurological:      Mental Status: He is alert and oriented to person, place, and time. Mental status is at baseline. Cranial Nerves: No cranial nerve deficit. Psychiatric:         Mood and Affect: Mood normal.          Diagnostics:      No results found for this or any previous visit (from the past 96 hour(s)). Imaging:  No results found for this or any previous visit. No results found for this or any previous visit. Results for orders placed in visit on 10/23/20    CT CHEST ABD PELV W CONT          Assessment:     1. Prostate cancer metastatic to bone ([address]    2. Prostate cancer ([address]    3. Bone metastases ([address]    4. Anemia, chronic renal failure, stage 3 (moderate) (HCC)    5. 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      Plan:   # Prostate cancer  # Bone metastases:   -- 12/11/2018 showed that his prostate specific antigen was 7.27 ng/mL. -- Patient was being followed by Dr. Eulalia Sheppard who just retired. A follow-up PSA and and a bone scan was schedule and the result showed intense uptake involving the medial aspect of the right clavicle.   Also there was increased uptake involving the lower thoracic spine at the level of T9 and T11. --The MRI of the thoracic spine shows correlating with abnormal uptake on prior whole body bone scan of 4/12/2019, there are acute/subacute fractures are seen within T9 and T11 vertebral bodies, both of which are almost certainly pathologic given foci of suspicious enhancement. Additional osseous metastatic lesion within left paracentral T2 vertebral body. Multilevel degenerative disc disease, to include ventral thoracic cord impingement at T2-T3 and T6-T7 vertebral body levels. + The MRI of the lumber spine also shows no fracture nor osseous metastatic disease within the lumbar spine or visualized osseous pelvis. Only partially visualized on this exam is the T11 vertebral body pathologic fracture, better evaluated on concurrent thoracic spine MRI. Severe right foraminal stenosis at L3-L4 with impingement of right L3 foraminal nerve root by prominent focal right foraminal disc bulge. Moderate bilateral foraminal stenoses at L5-S1, each with abutment of respective L5 foraminal nerve roots by adjacent foraminal disc. Moderate L3-L4 and mild L4-L5 canal stenoses. -- 4/2019 He was suggested on Degarelix at a dose of 240 mg loading dose followed by 80 mg monthly. He declined systemic chemotherapy. Subsequently he had received homeopathic therapy for prostate cancer in [place] instead. -- He had stroke in March 2020. He was poor historian after stroke with mostly verbal response only in yes or no. He has been working with speech therapy after stroke. -- 7/14/2020 PSA was elevated from 3.7 to 7.5  -- 9/28/2020 CT C/A/P reported no findings to suggest interval progression of metastatic disease.  -- 9/15/2020 Bone Scan reported persistent moderate to intense activity involving the medial aspect of both clavicular heads. Less intense uptake involving the right anterior fourth rib as well as T9 and T11 vertebra.  No new foci of uptake is identified to strongly suggest progression of osseous metastatic disease. -- 11/5/2020 PSA was 7.4  -- He has seen Dr. Martina Mendoza who did not see any current role for radiation as his urinary function is excellent and he has metastatic disease that is asymptomatic. He was agreeable with re-initiation of ADT given his excellent response at pervious time. He continues to decline chemotherapy. -- 2/3/2021 C1 Degarelix and Xgeva at [facility]. -- 4/8/2021 PSA 4.8.  -- 6/22/2021 PSA 4.2  -- 8/17/2021 PSA 6.6   He has tolerated therapy without high graded toxicities. Clinical stable. We have discussed about potential addition of anti-androgens or combined androgen blockade as next options of therapy to increase therapy effect: Degarelix maintenance dose 80 mg every 4 weeks; Abiraterone 1,000 mg once daily in combination with Prednisone 5 mg once daily. Plan:  -- He will continue Degarelix and Xgeva injection at [facility] as scheduled. -- CT CA/P and Bone scan for interval assessment. -- Lab check: CBC, CMP, PSA/Testosterone. -- Will start authorization process for Abiraterone 1,000 mg once daily in combination with Prednisone 5 mg once daily  -- We will see the patient back in clinic in about 4 weeks. Always sooner if required. Degarelix  Day 1: Degarelix 240mg in one 4-week cycle, followed by:  Day 1: Degarelix 80mg (administered as 1 subcutaneous 4mL injection). Repeat cycle every 4 weeks. # Anemia associated with chronic renal failure, stage III:  # Hx Iron deficiency anemia   -- The patient has been scheduled to receive Procrit therapy whenever his hemoglobin is below 10 g/dL and hematocrit is below 30%. -- Recent CBC and CMP reviewed. We will repeat CBC, Iron profiles. /ferritin periodically. No orders of the defined types were placed in this encounter. Mr. Ira Miller has a reminder for a \"due or due soon\" health maintenance.  I have asked that he contact his primary care provider for follow-up on this health maintenance. All of patient's questions answered to their apparent satisfaction. They verbally show understanding and agreement with aforementioned plan. Ulices Tlaley MD  8/26/2021            Parts of this document has been produced using Dragon dictation system. Unrecognized errors in transcription may be present. Please do not hesitate to reach out for any questions or clarifications.       CC: Regine Britton MD

## 2021-08-26 ENCOUNTER — OFFICE VISIT (OUTPATIENT)
Dept: ONCOLOGY | Age: 81
End: 2021-08-26
Payer: MEDICARE

## 2021-08-26 VITALS
HEART RATE: 78 BPM | RESPIRATION RATE: 14 BRPM | HEIGHT: 70 IN | DIASTOLIC BLOOD PRESSURE: 72 MMHG | BODY MASS INDEX: 25.28 KG/M2 | WEIGHT: 176.6 LBS | SYSTOLIC BLOOD PRESSURE: 124 MMHG | OXYGEN SATURATION: 98 % | TEMPERATURE: 98.7 F

## 2021-08-26 DIAGNOSIS — N18.30 ANEMIA, CHRONIC RENAL FAILURE, STAGE 3 (MODERATE) (HCC): ICD-10-CM

## 2021-08-26 DIAGNOSIS — C79.51 PROSTATE CANCER METASTATIC TO BONE (HCC): ICD-10-CM

## 2021-08-26 DIAGNOSIS — C79.51 PROSTATE CANCER METASTATIC TO BONE (HCC): Primary | ICD-10-CM

## 2021-08-26 DIAGNOSIS — C61 PROSTATE CANCER METASTATIC TO BONE (HCC): ICD-10-CM

## 2021-08-26 DIAGNOSIS — C61 PROSTATE CANCER (HCC): ICD-10-CM

## 2021-08-26 DIAGNOSIS — C79.51 BONE METASTASES (HCC): ICD-10-CM

## 2021-08-26 DIAGNOSIS — C61 PROSTATE CANCER METASTATIC TO BONE (HCC): Primary | ICD-10-CM

## 2021-08-26 DIAGNOSIS — D50.8 IRON DEFICIENCY ANEMIA SECONDARY TO INADEQUATE DIETARY IRON INTAKE: ICD-10-CM

## 2021-08-26 DIAGNOSIS — D63.1 ANEMIA, CHRONIC RENAL FAILURE, STAGE 3 (MODERATE) (HCC): ICD-10-CM

## 2021-08-26 PROCEDURE — G0463 HOSPITAL OUTPT CLINIC VISIT: HCPCS | Performed by: INTERNAL MEDICINE

## 2021-08-26 PROCEDURE — 99214 OFFICE O/P EST MOD 30 MIN: CPT | Performed by: INTERNAL MEDICINE

## 2021-08-26 NOTE — PROGRESS NOTES
Bong Sanchez presents today for   Chief Complaint   Patient presents with    Follow-up       Is someone accompanying this pt? Yes, Kristen Lindquist Daughter    Is the patient using any DME equipment during [address]? Yes, walker    Coordination of Care:  1. Have you been to the ER, urgent care clinic since your last visit? Hospitalized since your last visit? no    2. Have you seen or consulted any other health care providers outside of the [address] [place] [address] since your last visit? Include any pap smears or colon screening.  no

## 2021-09-02 ENCOUNTER — HOSPITAL ENCOUNTER (OUTPATIENT)
Dept: NUCLEAR MEDICINE | Age: 81
Discharge: HOME | End: 2021-09-02
Attending: INTERNAL MEDICINE
Payer: MEDICARE

## 2021-09-02 ENCOUNTER — HOSPITAL ENCOUNTER (OUTPATIENT)
Dept: CT IMAGING | Age: 81
Discharge: HOME | End: 2021-09-02
Attending: INTERNAL MEDICINE
Payer: MEDICARE

## 2021-09-02 LAB — CREAT UR-MCNC: 1.1 MG/DL (ref 0.6–1.3)

## 2021-09-02 PROCEDURE — 82565 ASSAY OF CREATININE: CPT

## 2021-09-02 PROCEDURE — 74011000636 HC RX REV CODE- 636: Performed by: INTERNAL MEDICINE

## 2021-09-02 PROCEDURE — A9503 TC99M MEDRONATE: HCPCS

## 2021-09-02 PROCEDURE — 74177 CT ABD & PELVIS W/CONTRAST: CPT

## 2021-09-02 RX ADMIN — IOPAMIDOL 95 ML: 612 INJECTION, SOLUTION INTRAVENOUS at 11:10

## 2021-09-03 RX ORDER — ABIRATERONE 500 MG/1
1000 TABLET ORAL DAILY
Qty: 60 TABLET | Status: CANCELLED | OUTPATIENT
Start: 2021-09-03

## 2021-09-03 RX ORDER — PREDNISONE 5 MG/1
5 TABLET ORAL DAILY
Qty: 60 TABLET | Status: CANCELLED | OUTPATIENT
Start: 2021-09-03

## 2021-09-27 ENCOUNTER — OFFICE VISIT (OUTPATIENT)
Dept: ONCOLOGY | Age: 81
End: 2021-09-27
Payer: MEDICARE

## 2021-09-27 VITALS
OXYGEN SATURATION: 100 % | HEART RATE: 62 BPM | HEIGHT: 68 IN | WEIGHT: 173.8 LBS | RESPIRATION RATE: 18 BRPM | TEMPERATURE: 97.8 F | SYSTOLIC BLOOD PRESSURE: 122 MMHG | DIASTOLIC BLOOD PRESSURE: 70 MMHG | BODY MASS INDEX: 26.34 KG/M2

## 2021-09-27 DIAGNOSIS — D72.819 CHRONIC LEUKOPENIA: ICD-10-CM

## 2021-09-27 DIAGNOSIS — D50.8 IRON DEFICIENCY ANEMIA SECONDARY TO INADEQUATE DIETARY IRON INTAKE: ICD-10-CM

## 2021-09-27 DIAGNOSIS — C61 PROSTATE CANCER METASTATIC TO BONE (HCC): Primary | ICD-10-CM

## 2021-09-27 DIAGNOSIS — C79.51 PROSTATE CANCER METASTATIC TO BONE (HCC): Primary | ICD-10-CM

## 2021-09-27 DIAGNOSIS — D63.1 ANEMIA, CHRONIC RENAL FAILURE, STAGE 3 (MODERATE) (HCC): ICD-10-CM

## 2021-09-27 DIAGNOSIS — C79.51 BONE METASTASES (HCC): ICD-10-CM

## 2021-09-27 DIAGNOSIS — C61 PROSTATE CANCER (HCC): ICD-10-CM

## 2021-09-27 DIAGNOSIS — N18.30 ANEMIA, CHRONIC RENAL FAILURE, STAGE 3 (MODERATE) (HCC): ICD-10-CM

## 2021-09-27 PROCEDURE — 99214 OFFICE O/P EST MOD 30 MIN: CPT | Performed by: INTERNAL MEDICINE

## 2021-09-27 PROCEDURE — G0463 HOSPITAL OUTPT CLINIC VISIT: HCPCS | Performed by: INTERNAL MEDICINE

## 2021-09-27 RX ORDER — PREDNISONE 5 MG/1
5 TABLET ORAL DAILY
Qty: 30 TABLET | Refills: 3 | Status: SHIPPED | OUTPATIENT
Start: 2021-09-27 | End: 2021-10-27

## 2021-09-27 RX ORDER — ABIRATERONE 500 MG/1
1000 TABLET ORAL DAILY
Qty: 60 TABLET | Refills: 3 | Status: SHIPPED | OUTPATIENT
Start: 2021-09-27 | End: 2021-10-27

## 2021-10-04 ENCOUNTER — DOCUMENTATION ONLY (OUTPATIENT)
Dept: ONCOLOGY | Age: 81
End: 2021-10-04

## 2021-10-04 NOTE — PROGRESS NOTES
Prior auth for abiraterone has been completed. Insurance has denied patients abiraterone.   An appeal has been sent to patients insurance today at 3:34pm.

## 2021-10-20 ENCOUNTER — LAB ONLY (OUTPATIENT)
Dept: ONCOLOGY | Age: 81
End: 2021-10-20

## 2021-10-20 ENCOUNTER — HOSPITAL ENCOUNTER (OUTPATIENT)
Dept: LAB | Age: 81
Discharge: HOME | End: 2021-10-20
Payer: MEDICARE

## 2021-10-20 DIAGNOSIS — D50.8 IRON DEFICIENCY ANEMIA SECONDARY TO INADEQUATE DIETARY IRON INTAKE: ICD-10-CM

## 2021-10-20 DIAGNOSIS — C79.51 BONE METASTASES (HCC): ICD-10-CM

## 2021-10-20 DIAGNOSIS — N18.30 ANEMIA, CHRONIC RENAL FAILURE, STAGE 3 (MODERATE) (HCC): ICD-10-CM

## 2021-10-20 DIAGNOSIS — D72.819 CHRONIC LEUKOPENIA: ICD-10-CM

## 2021-10-20 DIAGNOSIS — C61 PROSTATE CANCER (HCC): ICD-10-CM

## 2021-10-20 DIAGNOSIS — D63.1 ANEMIA, CHRONIC RENAL FAILURE, STAGE 3 (MODERATE) (HCC): ICD-10-CM

## 2021-10-20 DIAGNOSIS — C79.51 PROSTATE CANCER METASTATIC TO BONE (HCC): Primary | ICD-10-CM

## 2021-10-20 DIAGNOSIS — C61 PROSTATE CANCER METASTATIC TO BONE (HCC): Primary | ICD-10-CM

## 2021-10-20 LAB
ALBUMIN SERPL-MCNC: 3.6 G/DL (ref 3.4–5)
ALBUMIN/GLOB SERPL: 1.1 {RATIO} (ref 0.8–1.7)
ALP SERPL-CCNC: 89 U/L (ref 45–117)
ALT SERPL-CCNC: 25 U/L (ref 16–61)
ANION GAP SERPL CALC-SCNC: 2 MMOL/L (ref 3–18)
AST SERPL-CCNC: 23 U/L (ref 10–38)
BASOPHILS # BLD: 0 K/UL (ref 0–0.1)
BASOPHILS NFR BLD: 1 % (ref 0–2)
BILIRUB SERPL-MCNC: 0.6 MG/DL (ref 0.2–1)
BUN SERPL-MCNC: 22 MG/DL (ref 7–18)
BUN/CREAT SERPL: 17 (ref 12–20)
CALCIUM SERPL-MCNC: 9.5 MG/DL (ref 8.5–10.1)
CHLORIDE SERPL-SCNC: 106 MMOL/L (ref 100–111)
CO2 SERPL-SCNC: 33 MMOL/L (ref 21–32)
CREAT SERPL-MCNC: 1.27 MG/DL (ref 0.6–1.3)
DIFFERENTIAL METHOD BLD: ABNORMAL
EOSINOPHIL # BLD: 0.1 K/UL (ref 0–0.4)
EOSINOPHIL NFR BLD: 1 % (ref 0–5)
ERYTHROCYTE [DISTWIDTH] IN BLOOD BY AUTOMATED COUNT: 14.6 % (ref 11.6–14.5)
FERRITIN SERPL-MCNC: 463 NG/ML (ref 8–388)
GLOBULIN SER CALC-MCNC: 3.3 G/DL (ref 2–4)
GLUCOSE SERPL-MCNC: 114 MG/DL (ref 74–99)
HCT VFR BLD AUTO: 37.2 % (ref 36–48)
HGB BLD-MCNC: 11.8 G/DL (ref 13–16)
IRON SATN MFR SERPL: 32 % (ref 20–50)
IRON SERPL-MCNC: 70 UG/DL (ref 50–175)
LYMPHOCYTES # BLD: 1.4 K/UL (ref 0.9–3.6)
LYMPHOCYTES NFR BLD: 37 % (ref 21–52)
MCH RBC QN AUTO: 29.5 PG (ref 24–34)
MCHC RBC AUTO-ENTMCNC: 31.7 G/DL (ref 31–37)
MCV RBC AUTO: 93 FL (ref 78–100)
MONOCYTES # BLD: 0.5 K/UL (ref 0.05–1.2)
MONOCYTES NFR BLD: 14 % (ref 3–10)
NEUTS SEG # BLD: 1.8 K/UL (ref 1.8–8)
NEUTS SEG NFR BLD: 47 % (ref 40–73)
PLATELET # BLD AUTO: 114 K/UL (ref 135–420)
PMV BLD AUTO: 12.5 FL (ref 9.2–11.8)
POTASSIUM SERPL-SCNC: 5.2 MMOL/L (ref 3.5–5.5)
PROT SERPL-MCNC: 6.9 G/DL (ref 6.4–8.2)
PSA SERPL-MCNC: 2.8 NG/ML (ref 0–4)
RBC # BLD AUTO: 4 M/UL (ref 4.35–5.65)
SODIUM SERPL-SCNC: 141 MMOL/L (ref 136–145)
TIBC SERPL-MCNC: 219 UG/DL (ref 250–450)
WBC # BLD AUTO: 3.7 K/UL (ref 4.6–13.2)

## 2021-10-20 PROCEDURE — 82728 ASSAY OF FERRITIN: CPT

## 2021-10-20 PROCEDURE — 80053 COMPREHEN METABOLIC PANEL: CPT

## 2021-10-20 PROCEDURE — 36415 COLL VENOUS BLD VENIPUNCTURE: CPT

## 2021-10-20 PROCEDURE — 85025 COMPLETE CBC W/AUTO DIFF WBC: CPT

## 2021-10-20 PROCEDURE — 84153 ASSAY OF PSA TOTAL: CPT

## 2021-10-20 PROCEDURE — 83540 ASSAY OF IRON: CPT

## 2021-10-29 NOTE — PROGRESS NOTES
Hematology/Oncology  Progress Note    Name: Fannie Nova  : 1940    PCP: Teena Stanford MD     Mr. Tamiko Gilmore is a 80 y.o. man who is here for reassessment of his anemia associated with chronic kidney disease, Prostate Cancer       Subjective:       Mr. Tamiko Gilmore is a 81-year-old man who has history of prostate cancer and anemia associated with chronic kidney disease. Per chart review, he had an elevation in his PSA to 7 ng/mL then was referred for a bone scan on 2019. This test showed intense uptake involving the medial aspect of the right clavicle. Also there was increased uptake involving the lower thoracic spine at the level of T9 and T11. There was some concern that he may have a small pathologic fracture. With his history of prostate cancer and progressive PSA elevation he was suggested on Degarelix at a dose of 240 mg loading dose followed by 80 mg monthly. His daughter stated he declined systemic therapy. He had received homeopathic therapy for prostate cancer in [place] instead. He subsequently had stroke in 2020. He was very poor historian after stroke with mostly verbal response in yes or no. He has been working with speech therapy after stroke. He stated he did receive radiation to his brain mass previously. On 2/3/2021 C1 Degarelix and Xgeva. Today the patient was here for follow up. The patient was accompanied by his family. He denied any new symptoms. He has tolerated therapy well. The patient otherwise has no other complaints. Denied fever, chills, night sweat, unintentional weight loss, skin lumps or bumps, acute bleeding or bruising issues. Denied headache, acute vision change, dizziness, chest pain, worsen shortness of breath, palpitation, productive cough, nausea, vomiting, abdominal pain, altered bowel habits, dysuria, worsening bone pain or back pain, new focal numbness or weakness.          Past medical history, family history, and social history: these were reviewed and remains unchanged. Past Medical History:   Diagnosis Date    Elevated PSA     Hypercholesterolemia     Hypertension     Stroke (cerebrum) ([facility] [address]      History reviewed. No pertinent surgical history. Social History     Socioeconomic History    Marital status: SINGLE     Spouse name: Not on file    Number of children: Not on file    Years of education: Not on file    Highest education level: Not on file   Occupational History    Not on file   Tobacco Use    Smoking status: Never Smoker    Smokeless tobacco: Never Used   Substance and Sexual Activity    Alcohol use: No    Drug use: Not on file    Sexual activity: Not on file   Other Topics Concern    Not on file   Social History Narrative    Not on file     Social Determinants of Health     Financial Resource Strain:     Difficulty of Paying Living Expenses: Not on file   Food Insecurity:     Worried About Running Out of Food in the Last Year: Not on file    Jesi of Food in the Last Year: Not on file   Transportation Needs:     Lack of Transportation (Medical): Not on file    Lack of Transportation (Non-Medical):  Not on file   Physical Activity:     Days of Exercise per Week: Not on file    Minutes of Exercise per Session: Not on file   Stress:     Feeling of Stress : Not on file   Social Connections:     Frequency of Communication with Friends and Family: Not on file    Frequency of Social Gatherings with Friends and Family: Not on file    Attends Yarsani Services: Not on file    Active Member of Clubs or Organizations: Not on file    Attends Club or Organization Meetings: Not on file    Marital Status: Not on file   Intimate Partner Violence:     Fear of Current or Ex-Partner: Not on file    Emotionally Abused: Not on file    Physically Abused: Not on file    Sexually Abused: Not on file   Housing Stability:     Unable to Pay for Housing in the Last Year: Not on file    Number of [place] in the Last Year: Not on file    Unstable Housing in the Last Year: Not on file     Family History   Problem Relation Age of Onset    Diabetes Mother     Hypertension Mother     Heart Disease Mother     Cancer Father     Heart Disease Brother     Diabetes Brother     Heart Disease Brother      Current Outpatient Medications   Medication Sig Dispense Refill    abiraterone 500 mg tab       tamsulosin (FLOMAX) 0.4 mg capsule take 1 capsule by mouth every evening 90 Cap 0    bumetanide (BUMEX) 2 mg tablet Take 2 mg by mouth daily. 0    CARTIA  mg ER capsule   0    metFORMIN (GLUCOPHAGE) 500 mg tablet Take  by mouth two (2) times daily (with meals).  timolol (TIMOPTIC) 0.5 % ophthalmic solution 1 Drop two (2) times a day.  ELIQUIS 5 mg tablet   0    atorvastatin (LIPITOR) 40 mg tablet take 1 tablet by mouth at bedtime for cholesterol  0    fenofibrate micronized (LOFIBRA) 67 mg capsule take 1 capsule by mouth once daily  0    furosemide (LASIX) 20 mg tablet   0    levETIRAcetam (KEPPRA) 750 mg tablet take 1 tablet by mouth twice a day  0    carvedilol (COREG) 12.5 mg tablet Take  by mouth two (2) times daily (with meals).  losartan-hydrochlorothiazide (HYZAAR) 100-25 mg per tablet Take 1 Tab by mouth daily.  omeprazole (PRILOSEC) 10 mg capsule Take 10 mg by mouth daily. Review of Systems   Constitutional: Negative for chills, diaphoresis, fever, malaise/fatigue and weight loss. Respiratory: Negative for cough, hemoptysis, shortness of breath and wheezing. Cardiovascular: Negative for chest pain, palpitations and leg swelling. Gastrointestinal: Negative for abdominal pain, diarrhea, heartburn, nausea and vomiting. Genitourinary: Negative for dysuria, frequency, hematuria and urgency. Musculoskeletal: Negative for joint pain and myalgias. Skin: Negative for itching and rash. Neurological: Negative for dizziness, seizures, weakness and headaches. Psychiatric/Behavioral: Negative for depression. The patient does not have insomnia. Objective:     Visit Vitals  BP (!) 121/58 (BP 1 Location: Left upper arm, BP Patient Position: Sitting, BP Cuff Size: Adult)   Pulse (!) 58   Resp 16   Ht 5' 8\" (1.727 m)   Wt 78.9 kg (174 lb)   SpO2 99%   BMI 26.46 kg/m²         Physical Exam  Constitutional:       General: He is not in acute distress. HENT:      Head: Normocephalic and atraumatic. Eyes:      Pupils: Pupils are equal, round, and reactive to light. Cardiovascular:      Pulses: Normal pulses. Heart sounds: Normal heart sounds. No murmur heard. Pulmonary:      Effort: Pulmonary effort is normal. No respiratory distress. Breath sounds: Normal breath sounds. Abdominal:      General: Bowel sounds are normal. There is no distension. Palpations: Abdomen is soft. There is no mass. Tenderness: There is no abdominal tenderness. There is no guarding. Musculoskeletal:         General: No swelling or tenderness. Cervical back: Neck supple. No rigidity. Lymphadenopathy:      Cervical: No cervical adenopathy. Skin:     General: Skin is warm. Findings: No rash. Neurological:      Mental Status: He is alert and oriented to person, place, and time. Mental status is at baseline. Cranial Nerves: No cranial nerve deficit. Psychiatric:         Mood and Affect: Mood normal.          Diagnostics:      No results found for this or any previous visit (from the past 96 hour(s)). Imaging:  No results found for this or any previous visit. No results found for this or any previous visit. Results for orders placed in visit on 08/26/21    CT CHEST ABD PELV W CONT    Narrative  EXAM: CT CHEST, ABDOMEN AND PELVIS WITH CONTRAST    CLINICAL INDICATION/HISTORY: Prostate cancer with bone metastases.     COMPARISON: Outside hospital CT chest/abdomen/pelvis 9/28/2020    TECHNIQUE:  CT chest, abdomen and pelvis with IV contrast.  All CT scans at this facility are performed using dose optimization technique as  appropriate to the performed examination, to include automated exposure control,  adjustment of the mA and/or kV according to patient's size (including  appropriate matching for site-specific examinations), or use of an iterative  reconstruction technique. FINDINGS:    Lung/Airways:  6 mm nodule along the right major fissure (4, 30), may be an  intrapulmonary lymph node, stable. Another 4 mm nodule more inferiorly along the  right major fissure (33) may also be an intrapulmonary lymph node, also stable. 4 mm right upper lobe nodule (4, 18), stable. 3 mm right middle lobe nodule  (41), stable. 2 mm right lower lobe nodule (41), not definitely seen on prior. No consolidation or pleural effusion. Base of Neck:  Unremarkable. Mediastinum: 1 cm short axis AP window lymph node. Main pulmonary artery  measures 3.7 cm, suggesting pulmonary arterial hypertension. Heart: Small pericardial effusion, increased from prior. . Extensive coronary  artery calcifications. Mild aortic valve calcification. Liver: Diffuse low-attenuation of the liver likely reflects steatosis. Few  subcentimeter hypodense lesions (for example, measuring 5 mm in the inferior  right hepatic lobe on 3, 23) are too small to characterize, but most likely  cysts or hemangiomas, and stable from prior. 1.6 x 0.9 cm focus of arterial  enhancement in segment 7 (11), stable from prior. Biliary: Unremarkable. Pancreas: Unremarkable. Spleen: Unremarkable. Adrenal glands: Unremarkable. Kidneys: Punctate nonobstructing bilateral renal stones. Mild areas of scarring  bilateral kidneys, right greater than left. 2.5 cm left renal cyst. No  hydronephrosis. Reproductive organs: Evaluation is significantly limited due to streak artifact  from bilateral hyperechoic opacities. Bladder: Limited evaluation, but grossly unremarkable.     Bowel: Tiny hiatal hernia. No evidence for bowel obstruction or inflammation. Lymph nodes: No pathologically enlarged lymph nodes. Vasculature: Extensive burden of calcified atherosclerotic disease, including at  the mesenteric and renal artery ostia. Other: No free fluid or free air. Body wall: Mild bilateral gynecomastia. Tiny fat-containing umbilical hernia. Areas of stranding in the subcutaneous fat of the lower abdominal wall may  reflect contusions or sequela of medication injection. Bilateral hydroceles,  with fluid extending into the bilateral inguinal regions, new on the left, but  stable on the right since 9/28/2020. Bones: Bilateral hip arthroplasties. Moderate T9 compression deformity and mild  T11 compression deformity, both unchanged. Multilevel degenerative disc disease. Subtle sclerosis of the right clavicular head is unchanged from 9/28/2020. Please see nuclear medicine bone scan performed on the same day and dictated  separately. Impression  1. New 2 mm right lower lobe pulmonary nodule, nonspecific. Attention on  follow-up. Other small pulmonary nodules are unchanged from 9/28/2020.    2. Arterially enhancing lesion in the posterior right hepatic lobe measuring 1.6  cm, most likely a flash filling hemangioma, stable from 9/28/2020.  -Few subcentimeter hypodense lesions in the liver are too small to characterize,  but likely cysts, also unchanged. 3. Nonobstructing renal stones bilaterally. 4. Please see nuclear medicine bone scan performed the same day and dictated  separately for complete evaluation. Unchanged subtle sclerosis in the right  clavicular head. 5. Small pericardial effusion is increased from prior. 6. Enlarged main pulmonary artery, unchanged from prior, suggests pulmonary  chill hypertension. 7. Other findings as above. Assessment:     1. Prostate cancer metastatic to bone ([address]    2. Prostate cancer ([address]    3. Bone metastases ([address]    4.  Anemia, chronic renal failure, stage 3 (moderate) (HCC)    5. 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      Plan:   # Prostate cancer  # Bone metastases:   -- 12/11/2018 showed that his prostate specific antigen was 7.27 ng/mL. -- Patient was being followed by Dr. Roc Davenport who just retired. A follow-up PSA and and a bone scan was schedule and the result showed intense uptake involving the medial aspect of the right clavicle. Also there was increased uptake involving the lower thoracic spine at the level of T9 and T11. --The MRI of the thoracic spine shows correlating with abnormal uptake on prior whole body bone scan of 4/12/2019, there are acute/subacute fractures are seen within T9 and T11 vertebral bodies, both of which are almost certainly pathologic given foci of suspicious enhancement. Additional osseous metastatic lesion within left paracentral T2 vertebral body. Multilevel degenerative disc disease, to include ventral thoracic cord impingement at T2-T3 and T6-T7 vertebral body levels. + The MRI of the lumber spine also shows no fracture nor osseous metastatic disease within the lumbar spine or visualized osseous pelvis. Only partially visualized on this exam is the T11 vertebral body pathologic fracture, better evaluated on concurrent thoracic spine MRI. Severe right foraminal stenosis at L3-L4 with impingement of right L3 foraminal nerve root by prominent focal right foraminal disc bulge. Moderate bilateral foraminal stenoses at L5-S1, each with abutment of respective L5 foraminal nerve roots by adjacent foraminal disc. Moderate L3-L4 and mild L4-L5 canal stenoses. -- 4/2019 He was suggested on Degarelix at a dose of 240 mg loading dose followed by 80 mg monthly. He declined systemic chemotherapy. Subsequently he had received homeopathic therapy for prostate cancer in [place] instead. -- He had stroke in March 2020.  He was poor historian after stroke with mostly verbal response only in yes or no. He has been working with speech therapy after stroke. -- 7/14/2020 PSA was elevated from 3.7 to 7.5  -- 9/28/2020 CT C/A/P reported no findings to suggest interval progression of metastatic disease.  -- 9/15/2020 Bone Scan reported persistent moderate to intense activity involving the medial aspect of both clavicular heads. Less intense uptake involving the right anterior fourth rib as well as T9 and T11 vertebra. No new foci of uptake is identified to strongly suggest progression of osseous metastatic disease. -- 11/5/2020 PSA was 7.4  -- He has seen Dr. Lawrence Lerma who did not see any current role for radiation as his urinary function is excellent and he has metastatic disease that is asymptomatic. He was agreeable with re-initiation of ADT given his excellent response at pervious time. He continues to decline chemotherapy. -- 2/3/2021 C1 Degarelix and Xgeva at [facility]. -- 4/8/2021 PSA 4.8.  -- 6/22/2021 PSA 4.2  -- 8/17/2021 PSA 6.6   -- 9/2/2021 CT New 2 mm right lower lobe pulmonary nodule, nonspecific. Attention on follow-up. Other small pulmonary nodules are unchanged from 9/28/2020.  + Arterially enhancing lesion in the posterior right hepatic lobe measuring 1.6  cm, most likely a flash filling hemangioma, stable from 9/28/2020.  + Few subcentimeter hypodense lesions in the liver are too small to characterize,  but likely cysts, also unchanged. -- 9/2/2021 Bone scan reported no definite scintigraphic evidence of osseous metastatic disease. Subtle focal uptake seen at the posterior T11 vertebral body may correspond to a compression deformity at this location seen on reference imaging. Suggests chronic fracture. -- Patient started Abiraterone and prednisone combo on 10/13/21. He has tolerated therapy without high graded toxicities. Clinical stable. -- 10/20/2021 PSA 2.8    Plan:  -- He will continue Xgeva injection at [facility] as scheduled.   -- He will continue Degarelix maintenance dose 80 mg every 4 weeks; Abiraterone 1,000 mg once daily in combination with Prednisone 5 mg once daily. -- Lab check: CBC, CMP, PSA/Testosterone. -- We will see the patient back in clinic in about 8 weeks. Always sooner if required. Degarelix  Day 1: Degarelix 240mg in one 4-week cycle, followed by:  Day 1: Degarelix 80mg (administered as 1 subcutaneous 4mL injection). Repeat cycle every 4 weeks.         # Nonobstructing renal stones bilaterally. -- Asymptomatic. The patient will f/u his PCP/Urology for further management. # Anemia associated with chronic renal failure, stage III:  # Hx Iron deficiency anemia   -- The patient has been scheduled to receive Procrit therapy whenever his hemoglobin is below 10 g/dL and hematocrit is below 30%. -- Recent CBC and CMP reviewed. We will repeat CBC, Iron profiles. /ferritin periodically. Orders Placed This Encounter    abiraterone 500 mg tab           Mr. Rock Mims has a reminder for a \"due or due soon\" health maintenance. I have asked that he contact his primary care provider for follow-up on this health maintenance. All of patient's questions answered to their apparent satisfaction. They verbally show understanding and agreement with aforementioned plan. Rose Marie Luu MD  11/3/2021            Parts of this document has been produced using Dragon dictation system. Unrecognized errors in transcription may be present. Please do not hesitate to reach out for any questions or clarifications.       CC: Avril Tolbert MD

## 2021-11-03 ENCOUNTER — OFFICE VISIT (OUTPATIENT)
Dept: ONCOLOGY | Age: 81
End: 2021-11-03
Payer: MEDICARE

## 2021-11-03 VITALS
HEIGHT: 68 IN | WEIGHT: 174 LBS | HEART RATE: 58 BPM | RESPIRATION RATE: 16 BRPM | DIASTOLIC BLOOD PRESSURE: 58 MMHG | OXYGEN SATURATION: 99 % | SYSTOLIC BLOOD PRESSURE: 121 MMHG | BODY MASS INDEX: 26.37 KG/M2

## 2021-11-03 DIAGNOSIS — C61 PROSTATE CANCER (HCC): ICD-10-CM

## 2021-11-03 DIAGNOSIS — D50.8 IRON DEFICIENCY ANEMIA SECONDARY TO INADEQUATE DIETARY IRON INTAKE: ICD-10-CM

## 2021-11-03 DIAGNOSIS — C61 PROSTATE CANCER METASTATIC TO BONE (HCC): Primary | ICD-10-CM

## 2021-11-03 DIAGNOSIS — N18.30 ANEMIA, CHRONIC RENAL FAILURE, STAGE 3 (MODERATE) (HCC): ICD-10-CM

## 2021-11-03 DIAGNOSIS — D63.1 ANEMIA, CHRONIC RENAL FAILURE, STAGE 3 (MODERATE) (HCC): ICD-10-CM

## 2021-11-03 DIAGNOSIS — C79.51 BONE METASTASES (HCC): ICD-10-CM

## 2021-11-03 DIAGNOSIS — C79.51 PROSTATE CANCER METASTATIC TO BONE (HCC): Primary | ICD-10-CM

## 2021-11-03 PROCEDURE — G0463 HOSPITAL OUTPT CLINIC VISIT: HCPCS | Performed by: INTERNAL MEDICINE

## 2021-11-03 PROCEDURE — 99213 OFFICE O/P EST LOW 20 MIN: CPT | Performed by: INTERNAL MEDICINE

## 2021-11-03 RX ORDER — ABIRATERONE 500 MG/1
TABLET ORAL
COMMUNITY
Start: 2021-10-27

## 2021-12-21 DIAGNOSIS — C61 PROSTATE CANCER (HCC): ICD-10-CM

## 2021-12-21 DIAGNOSIS — C79.51 BONE METASTASES (HCC): ICD-10-CM

## 2021-12-21 DIAGNOSIS — C61 PROSTATE CANCER METASTATIC TO BONE (HCC): Primary | ICD-10-CM

## 2021-12-21 DIAGNOSIS — C79.51 PROSTATE CANCER METASTATIC TO BONE (HCC): Primary | ICD-10-CM

## 2021-12-21 RX ORDER — ABIRATERONE ACETATE 500 MG/1
1000 TABLET, FILM COATED ORAL DAILY
Qty: 90 TABLET | Refills: 0 | Status: SHIPPED | OUTPATIENT
Start: 2021-12-21 | End: 2021-12-29 | Stop reason: SDUPTHER

## 2021-12-22 ENCOUNTER — TELEPHONE (OUTPATIENT)
Dept: ONCOLOGY | Age: 81
End: 2021-12-22

## 2021-12-22 NOTE — TELEPHONE ENCOUNTER
Ms. Truman Elliott [facility] letting Dr. Fariba Nash that a PA is needed from him before they can refill his prescription for chemo tablet that he is taking that comes through the mail. If you could give them call to let them know you authorize it or give her a call to let her know if he still needs to continue taking it. Call back number for Ms. Minesh Torrez is [phone].

## 2021-12-29 ENCOUNTER — HOSPITAL ENCOUNTER (OUTPATIENT)
Dept: LAB | Age: 81
Discharge: HOME | End: 2021-12-29
Payer: MEDICARE

## 2021-12-29 ENCOUNTER — LAB ONLY (OUTPATIENT)
Dept: ONCOLOGY | Age: 81
End: 2021-12-29

## 2021-12-29 DIAGNOSIS — C61 PROSTATE CANCER METASTATIC TO BONE (HCC): Primary | ICD-10-CM

## 2021-12-29 DIAGNOSIS — C61 PROSTATE CANCER (HCC): ICD-10-CM

## 2021-12-29 DIAGNOSIS — D72.819 CHRONIC LEUKOPENIA: ICD-10-CM

## 2021-12-29 DIAGNOSIS — D63.1 ANEMIA, CHRONIC RENAL FAILURE, STAGE 3 (MODERATE) (HCC): ICD-10-CM

## 2021-12-29 DIAGNOSIS — C79.51 PROSTATE CANCER METASTATIC TO BONE (HCC): ICD-10-CM

## 2021-12-29 DIAGNOSIS — D50.8 IRON DEFICIENCY ANEMIA SECONDARY TO INADEQUATE DIETARY IRON INTAKE: ICD-10-CM

## 2021-12-29 DIAGNOSIS — C61 PROSTATE CANCER METASTATIC TO BONE (HCC): ICD-10-CM

## 2021-12-29 DIAGNOSIS — C79.51 PROSTATE CANCER METASTATIC TO BONE (HCC): Primary | ICD-10-CM

## 2021-12-29 DIAGNOSIS — N18.30 ANEMIA, CHRONIC RENAL FAILURE, STAGE 3 (MODERATE) (HCC): ICD-10-CM

## 2021-12-29 DIAGNOSIS — C79.51 BONE METASTASES (HCC): ICD-10-CM

## 2021-12-29 LAB
ALBUMIN SERPL-MCNC: 3.5 G/DL (ref 3.4–5)
ALBUMIN/GLOB SERPL: 1.1 {RATIO} (ref 0.8–1.7)
ALP SERPL-CCNC: 76 U/L (ref 45–117)
ALT SERPL-CCNC: 24 U/L (ref 16–61)
ANION GAP SERPL CALC-SCNC: 4 MMOL/L (ref 3–18)
AST SERPL-CCNC: 26 U/L (ref 10–38)
BASOPHILS # BLD: 0 K/UL (ref 0–0.1)
BASOPHILS NFR BLD: 0 % (ref 0–2)
BILIRUB SERPL-MCNC: 0.4 MG/DL (ref 0.2–1)
BUN SERPL-MCNC: 21 MG/DL (ref 7–18)
BUN/CREAT SERPL: 20 (ref 12–20)
CALCIUM SERPL-MCNC: 9.2 MG/DL (ref 8.5–10.1)
CHLORIDE SERPL-SCNC: 111 MMOL/L (ref 100–111)
CO2 SERPL-SCNC: 30 MMOL/L (ref 21–32)
CREAT SERPL-MCNC: 1.05 MG/DL (ref 0.6–1.3)
DIFFERENTIAL METHOD BLD: ABNORMAL
EOSINOPHIL # BLD: 0.1 K/UL (ref 0–0.4)
EOSINOPHIL NFR BLD: 2 % (ref 0–5)
ERYTHROCYTE [DISTWIDTH] IN BLOOD BY AUTOMATED COUNT: 14.7 % (ref 11.6–14.5)
FERRITIN SERPL-MCNC: 426 NG/ML (ref 8–388)
GLOBULIN SER CALC-MCNC: 3.1 G/DL (ref 2–4)
GLUCOSE SERPL-MCNC: 92 MG/DL (ref 74–99)
HCT VFR BLD AUTO: 35.5 % (ref 36–48)
HGB BLD-MCNC: 11.1 G/DL (ref 13–16)
IMM GRANULOCYTES # BLD AUTO: 0 K/UL (ref 0–0.04)
IMM GRANULOCYTES NFR BLD AUTO: 0 % (ref 0–0.5)
IRON SATN MFR SERPL: 24 % (ref 20–50)
IRON SERPL-MCNC: 49 UG/DL (ref 50–175)
LYMPHOCYTES # BLD: 1.1 K/UL (ref 0.9–3.6)
LYMPHOCYTES NFR BLD: 28 % (ref 21–52)
MCH RBC QN AUTO: 29.7 PG (ref 24–34)
MCHC RBC AUTO-ENTMCNC: 31.3 G/DL (ref 31–37)
MCV RBC AUTO: 94.9 FL (ref 78–100)
MONOCYTES # BLD: 0.6 K/UL (ref 0.05–1.2)
MONOCYTES NFR BLD: 15 % (ref 3–10)
NEUTS SEG # BLD: 2.1 K/UL (ref 1.8–8)
NEUTS SEG NFR BLD: 55 % (ref 40–73)
NRBC # BLD: 0 K/UL (ref 0–0.01)
NRBC BLD-RTO: 0 PER 100 WBC
PLATELET # BLD AUTO: 136 K/UL (ref 135–420)
PMV BLD AUTO: 12.5 FL (ref 9.2–11.8)
POTASSIUM SERPL-SCNC: 4.1 MMOL/L (ref 3.5–5.5)
PROT SERPL-MCNC: 6.6 G/DL (ref 6.4–8.2)
PSA SERPL-MCNC: 0.3 NG/ML (ref 0–4)
RBC # BLD AUTO: 3.74 M/UL (ref 4.35–5.65)
SODIUM SERPL-SCNC: 145 MMOL/L (ref 136–145)
TIBC SERPL-MCNC: 203 UG/DL (ref 250–450)
WBC # BLD AUTO: 3.8 K/UL (ref 4.6–13.2)

## 2021-12-29 PROCEDURE — 83540 ASSAY OF IRON: CPT

## 2021-12-29 PROCEDURE — 80053 COMPREHEN METABOLIC PANEL: CPT

## 2021-12-29 PROCEDURE — 84153 ASSAY OF PSA TOTAL: CPT

## 2021-12-29 PROCEDURE — 82728 ASSAY OF FERRITIN: CPT

## 2021-12-29 PROCEDURE — 85025 COMPLETE CBC W/AUTO DIFF WBC: CPT

## 2021-12-30 RX ORDER — ABIRATERONE ACETATE 500 MG/1
1000 TABLET, FILM COATED ORAL DAILY
Qty: 90 TABLET | Refills: 0 | Status: SHIPPED | OUTPATIENT
Start: 2021-12-30 | End: 2022-03-24

## 2022-01-10 ENCOUNTER — VIRTUAL VISIT (OUTPATIENT)
Dept: ONCOLOGY | Age: 82
End: 2022-01-10
Payer: MEDICARE

## 2022-01-10 DIAGNOSIS — C79.51 PROSTATE CANCER METASTATIC TO BONE (HCC): Primary | ICD-10-CM

## 2022-01-10 DIAGNOSIS — C61 PROSTATE CANCER (HCC): ICD-10-CM

## 2022-01-10 DIAGNOSIS — D50.8 IRON DEFICIENCY ANEMIA SECONDARY TO INADEQUATE DIETARY IRON INTAKE: ICD-10-CM

## 2022-01-10 DIAGNOSIS — D63.1 ANEMIA, CHRONIC RENAL FAILURE, STAGE 3 (MODERATE) (HCC): ICD-10-CM

## 2022-01-10 DIAGNOSIS — C79.51 BONE METASTASES (HCC): ICD-10-CM

## 2022-01-10 DIAGNOSIS — N18.30 ANEMIA, CHRONIC RENAL FAILURE, STAGE 3 (MODERATE) (HCC): ICD-10-CM

## 2022-01-10 DIAGNOSIS — C61 PROSTATE CANCER METASTATIC TO BONE (HCC): Primary | ICD-10-CM

## 2022-01-10 PROCEDURE — 99443 PR PHYS/QHP TELEPHONE EVALUATION 21-30 MIN: CPT | Performed by: INTERNAL MEDICINE

## 2022-01-10 NOTE — PROGRESS NOTES
Matthias Cantu is a 80 y.o. male, evaluated via audio-only technology on 1/10/2022 for Follow-up  . Assessment & Plan:   Diagnoses and all orders for this visit:    1. Prostate cancer metastatic to bone ([address]    2. Prostate cancer ([address]    3. Bone metastases ([facility]    4. Anemia, chronic renal failure, stage 3 (moderate) (HCC)    5. 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        # Prostate cancer  # Bone metastases:   -- 12/11/2018 showed that his prostate specific antigen was 7.27 ng/mL. -- Patient was being followed by Dr. Kilpatrick Artist who just retired. A follow-up PSA and and a bone scan was schedule and the result showed intense uptake involving the medial aspect of the right clavicle. Also there was increased uptake involving the lower thoracic spine at the level of T9 and T11. --The MRI of the thoracic spine shows correlating with abnormal uptake on prior whole body bone scan of 4/12/2019, there are acute/subacute fractures are seen within T9 and T11 vertebral bodies, both of which are almost certainly pathologic given foci of suspicious enhancement. Additional osseous metastatic lesion within left paracentral T2 vertebral body. Multilevel degenerative disc disease, to include ventral thoracic cord impingement at T2-T3 and T6-T7 vertebral body levels. + The MRI of the lumber spine also shows no fracture nor osseous metastatic disease within the lumbar spine or visualized osseous pelvis. Only partially visualized on this exam is the T11 vertebral body pathologic fracture, better evaluated on concurrent thoracic spine MRI. Severe right foraminal stenosis at L3-L4 with impingement of right L3 foraminal nerve root by prominent focal right foraminal disc bulge. Moderate bilateral foraminal stenoses at L5-S1, each with abutment of respective L5 foraminal nerve roots by adjacent foraminal disc. Moderate L3-L4 and mild L4-L5 canal stenoses.   -- 4/2019 He was suggested on Degarelix at a dose of 240 mg loading dose followed by 80 mg monthly. He declined systemic chemotherapy. Subsequently he had received homeopathic therapy for prostate cancer in [place] instead. -- He had stroke in March 2020. He was poor historian after stroke with mostly verbal response only in yes or no. He has been working with speech therapy after stroke. -- 7/14/2020 PSA was elevated from 3.7 to 7.5  -- 9/28/2020 CT C/A/P reported no findings to suggest interval progression of metastatic disease.  -- 9/15/2020 Bone Scan reported persistent moderate to intense activity involving the medial aspect of both clavicular heads. Less intense uptake involving the right anterior fourth rib as well as T9 and T11 vertebra. No new foci of uptake is identified to strongly suggest progression of osseous metastatic disease. -- 11/5/2020 PSA was 7.4  -- He has seen Dr. Maximilian Hernández who did not see any current role for radiation as his urinary function is excellent and he has metastatic disease that is asymptomatic. He was agreeable with re-initiation of ADT given his excellent response at pervious time. He continues to decline chemotherapy. -- 2/3/2021 C1 Degarelix and Xgeva at [facility]. -- 4/8/2021 PSA 4.8.  -- 6/22/2021 PSA 4.2  -- 8/17/2021 PSA 6.6   -- 9/2/2021 CT New 2 mm right lower lobe pulmonary nodule, nonspecific. Attention on follow-up. Other small pulmonary nodules are unchanged from 9/28/2020.  + Arterially enhancing lesion in the posterior right hepatic lobe measuring 1.6  cm, most likely a flash filling hemangioma, stable from 9/28/2020.  + Few subcentimeter hypodense lesions in the liver are too small to characterize,  but likely cysts, also unchanged. -- 9/2/2021 Bone scan reported no definite scintigraphic evidence of osseous metastatic disease. Subtle focal uptake seen at the posterior T11 vertebral body may correspond to a compression deformity at this location seen on reference imaging.  Suggests chronic fracture. -- Patient started Abiraterone and prednisone combo on 10/13/21.   -- 10/20/2021 PSA 2.8  -- His daughter reported he had about 1 week off meds d/t insurance coverage: 12/8-1/4/2022  He reported some fatigue which lasted few days after most recent Firmagon injection. -- He has tolerated therapy without high graded toxicities. Clinical stable. Today I have reviewed with the patient about recent lab reports. 12/29/2021 hemoglobin 11.1, hematocrit 35.5%, WBC 3.8, ANC 2.1, platelet 734,285. Iron saturation 24%, ferritin 426.  12/29/2021 PSA 0.3    Plan:  -- He will continue Xgeva injection at [facility] as scheduled. -- He will continue Degarelix maintenance dose 80 mg every 4 weeks; Abiraterone 1,000 mg once daily in combination with Prednisone 5 mg once daily. -- Lab check: CBC, CMP, PSA/Testosterone. -- We will see the patient back in clinic in about 8 weeks. Always sooner if required. Degarelix  Day 1: Degarelix 240mg in one 4-week cycle, followed by:  Day 1: Degarelix 80mg (administered as 1 subcutaneous 4mL injection). Repeat cycle every 4 weeks.         # Nonobstructing renal stones bilaterally. -- Asymptomatic. The patient will f/u his PCP/Urology for further management. # Anemia associated with chronic renal failure/ Anemia of chronic disease  # Hx Iron deficiency anemia   -- The patient has been scheduled to receive Procrit therapy whenever his hemoglobin is below 10 g/dL and hematocrit is below 30%. -- Recent CBC and CMP reviewed. 12/29/2021 hemoglobin 11.1, hematocrit 35.5%, WBC 3.8, ANC 2.1, platelet 395,194. Iron saturation 24%, ferritin 426. We will repeat CBC, Iron profiles. /ferritin periodically. 12  Subjective:   Mr. Zi Rudd is a 47-year-old man who has history of prostate cancer and anemia associated with chronic kidney disease. Per chart review, he had an elevation in his PSA to 7 ng/mL then was referred for a bone scan on 4/12/2019.   This test showed intense uptake involving the medial aspect of the right clavicle. Also there was increased uptake involving the lower thoracic spine at the level of T9 and T11. There was some concern that he may have a small pathologic fracture. With his history of prostate cancer and progressive PSA elevation he was suggested on Degarelix at a dose of 240 mg loading dose followed by 80 mg monthly. His daughter stated he declined systemic therapy. He had received homeopathic therapy for prostate cancer in [place] instead. He subsequently had stroke in March 2020. He was very poor historian after stroke with mostly verbal response in yes or no. He has been working with speech therapy after stroke. He stated he did receive radiation to his brain mass previously. On 2/3/2021 C1 Degarelix and Xgeva. Today the patient has follow up. The patient was accompanied by his daughter. He denied any new symptoms. He has tolerated therapy fairly well. He reported some fatigue which lasted few days after most recent Firmagon injection. The patient otherwise has no other complaints. Denied fever, chills, night sweat, unintentional weight loss, skin lumps or bumps, acute bleeding or bruising issues. Denied headache, acute vision change, dizziness, chest pain, worsen shortness of breath, palpitation, productive cough, nausea, vomiting, abdominal pain, altered bowel habits, dysuria, worsening bone pain or back pain, new focal numbness or weakness. Prior to Admission medications    Medication Sig Start Date End Date Taking? Authorizing Provider   Zytiga 500 mg tab Take 1,000 mg by mouth daily. On an empty stomach 1 hour prior or 2 hours after a meal 12/30/21   Vanita VOGT NP   abiraterone 500 mg tab  10/27/21   Provider, Historical   tamsulosin (FLOMAX) 0.4 mg capsule take 1 capsule by mouth every evening 10/23/19   Deya Evans MD   bumetanide (BUMEX) 2 mg tablet Take 2 mg by mouth daily.  1/11/18 Provider, Historical   CARTIA  mg ER capsule  1/13/18   Provider, Historical   metFORMIN (GLUCOPHAGE) 500 mg tablet Take  by mouth two (2) times daily (with meals). Provider, Historical   timolol (TIMOPTIC) 0.5 % ophthalmic solution 1 Drop two (2) times a day. Provider, Historical   ELIQUIS 5 mg tablet  1/27/17   Provider, Historical   atorvastatin (LIPITOR) 40 mg tablet take 1 tablet by mouth at bedtime for cholesterol 1/23/17   Provider, Historical   fenofibrate micronized (LOFIBRA) 67 mg capsule take 1 capsule by mouth once daily 1/23/17   Provider, Historical   furosemide (LASIX) 20 mg tablet  1/12/17   Provider, Historical   levETIRAcetam (KEPPRA) 750 mg tablet take 1 tablet by mouth twice a day 1/23/17   Provider, Historical   carvedilol (COREG) 12.5 mg tablet Take  by mouth two (2) times daily (with meals). Provider, Historical   losartan-hydrochlorothiazide (HYZAAR) 100-25 mg per tablet Take 1 Tab by mouth daily. Provider, Historical   omeprazole (PRILOSEC) 10 mg capsule Take 10 mg by mouth daily. Provider, Historical     Patient Active Problem List   Diagnosis Code    Atrial fibrillation (HCC) I48.91    Nonspecific abnormal electrocardiogram (ECG) (EKG) R94.31    Benign hypertensive heart disease without heart failure I11.9    Right bundle branch block I45.10    Other and unspecified hyperlipidemia E78.5    Hypercholesterolemia E78.00    Hypertension I10    Stroke (cerebrum) ([facility]) I63.9    Hydrocele, bilateral N43.3    PSA elevation R97.20    Anemia, chronic renal failure, stage 3 (moderate) ([facility]) N18.30, D63.1    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 D50.8    Prostate cancer ([facility]) C61    Chronic leukopenia D72.819       Review of Systems   Constitutional: Negative for chills, diaphoresis, fever, malaise/fatigue and weight loss. Respiratory: Negative for cough, hemoptysis, shortness of breath and wheezing.     Cardiovascular: Negative for chest pain, palpitations and leg swelling. Gastrointestinal: Negative for abdominal pain, diarrhea, heartburn, nausea and vomiting. Genitourinary: Negative for dysuria, frequency, hematuria and urgency. Musculoskeletal: Negative for joint pain and myalgias. Skin: Negative for itching and rash. Neurological: Negative for dizziness, seizures, weakness and headaches. Psychiatric/Behavioral: Negative for depression. The patient does not have insomnia. Patient-Reported Vitals 1/10/2022   Patient-Reported Weight 172lb       El Marie, who was evaluated through a patient-initiated, synchronous (real-time) audio only encounter, and/or her healthcare decision maker, is aware that it is a billable service, with coverage as determined by his insurance carrier. He provided verbal consent to proceed: Yes. He has not had a related appointment within my department in the past 7 days or scheduled within the next 24 hours. On this date 01/10/2022 I have spent 30 minutes reviewing previous notes, test results and face to face (virtual) with the patient discussing the diagnosis and importance of compliance with the treatment plan as well as documenting on the day of the visit.     Tod Vickers MD no

## 2022-01-20 RX ORDER — PREDNISONE 5 MG/1
5 TABLET ORAL DAILY
Qty: 30 TABLET | Refills: 3 | Status: SHIPPED | OUTPATIENT
Start: 2022-01-20 | End: 2022-05-16 | Stop reason: SDUPTHER

## 2022-03-19 PROBLEM — N18.30 ANEMIA, CHRONIC RENAL FAILURE, STAGE 3 (MODERATE) (HCC): Status: ACTIVE | Noted: 2018-02-02

## 2022-03-19 PROBLEM — D63.1 ANEMIA, CHRONIC RENAL FAILURE, STAGE 3 (MODERATE) (HCC): Status: ACTIVE | Noted: 2018-02-02

## 2022-03-19 PROBLEM — D50.8 IRON DEFICIENCY ANEMIA SECONDARY TO INADEQUATE DIETARY IRON INTAKE: Status: ACTIVE | Noted: 2018-02-02

## 2022-03-19 PROBLEM — C61 PROSTATE CANCER (HCC): Status: ACTIVE | Noted: 2019-04-08

## 2022-03-19 PROBLEM — D72.819 CHRONIC LEUKOPENIA: Status: ACTIVE | Noted: 2019-04-08

## 2022-03-23 DIAGNOSIS — C61 PROSTATE CANCER (HCC): ICD-10-CM

## 2022-03-23 DIAGNOSIS — C61 PROSTATE CANCER METASTATIC TO BONE (HCC): ICD-10-CM

## 2022-03-23 DIAGNOSIS — C79.51 PROSTATE CANCER METASTATIC TO BONE (HCC): ICD-10-CM

## 2022-03-23 DIAGNOSIS — C79.51 BONE METASTASES (HCC): ICD-10-CM

## 2022-03-24 RX ORDER — ABIRATERONE ACETATE 500 MG/1
TABLET, FILM COATED ORAL
Qty: 90 TABLET | Refills: 0 | Status: SHIPPED | OUTPATIENT
Start: 2022-03-24 | End: 2022-05-13

## 2022-03-29 ENCOUNTER — OFFICE VISIT (OUTPATIENT)
Dept: ONCOLOGY | Age: 82
End: 2022-03-29
Payer: MEDICARE

## 2022-03-29 VITALS
WEIGHT: 186 LBS | BODY MASS INDEX: 28.19 KG/M2 | RESPIRATION RATE: 18 BRPM | OXYGEN SATURATION: 100 % | HEIGHT: 68 IN | DIASTOLIC BLOOD PRESSURE: 52 MMHG | SYSTOLIC BLOOD PRESSURE: 130 MMHG | HEART RATE: 60 BPM

## 2022-03-29 DIAGNOSIS — D50.8 IRON DEFICIENCY ANEMIA SECONDARY TO INADEQUATE DIETARY IRON INTAKE: ICD-10-CM

## 2022-03-29 DIAGNOSIS — D63.1 ANEMIA, CHRONIC RENAL FAILURE, STAGE 3 (MODERATE) (HCC): ICD-10-CM

## 2022-03-29 DIAGNOSIS — C79.51 PROSTATE CANCER METASTATIC TO BONE (HCC): Primary | ICD-10-CM

## 2022-03-29 DIAGNOSIS — N18.30 ANEMIA, CHRONIC RENAL FAILURE, STAGE 3 (MODERATE) (HCC): ICD-10-CM

## 2022-03-29 DIAGNOSIS — N20.0 BILATERAL RENAL STONES: ICD-10-CM

## 2022-03-29 DIAGNOSIS — C61 PROSTATE CANCER METASTATIC TO BONE (HCC): Primary | ICD-10-CM

## 2022-03-29 PROCEDURE — G8752 SYS BP LESS 140: HCPCS | Performed by: INTERNAL MEDICINE

## 2022-03-29 PROCEDURE — G0463 HOSPITAL OUTPT CLINIC VISIT: HCPCS | Performed by: INTERNAL MEDICINE

## 2022-03-29 PROCEDURE — G8536 NO DOC ELDER MAL SCRN: HCPCS | Performed by: INTERNAL MEDICINE

## 2022-03-29 PROCEDURE — G8432 DEP SCR NOT DOC, RNG: HCPCS | Performed by: INTERNAL MEDICINE

## 2022-03-29 PROCEDURE — G8427 DOCREV CUR MEDS BY ELIG CLIN: HCPCS | Performed by: INTERNAL MEDICINE

## 2022-03-29 PROCEDURE — G8419 CALC BMI OUT NRM PARAM NOF/U: HCPCS | Performed by: INTERNAL MEDICINE

## 2022-03-29 PROCEDURE — 99214 OFFICE O/P EST MOD 30 MIN: CPT | Performed by: INTERNAL MEDICINE

## 2022-03-29 PROCEDURE — 1101F PT FALLS ASSESS-DOCD LE1/YR: CPT | Performed by: INTERNAL MEDICINE

## 2022-03-29 PROCEDURE — G8754 DIAS BP LESS 90: HCPCS | Performed by: INTERNAL MEDICINE

## 2022-03-29 NOTE — PROGRESS NOTES
Hematology/Oncology  Progress Note    NAME Nelson Nurse  : 1940    PCP: Monika Metz MD     Mr. Luisa Workman is a [de-identified] y.o. man who is here for reassessment of his anemia associated with Chronic Kidney Disease, Prostate Cancer     Subjective:   Mr. Luisa Workman is a 57-year-old man who has history of Prostate Cancer and Anemia associated with Chronic Kidney Disease. Per chart review, he had an elevation in his PSA to 7 ng/mL then was referred for a bone scan on 2019. This test showed intense uptake involving the medial aspect of the right clavicle. Also there was increased uptake involving the lower thoracic spine at the level of T9 and T11. There was some concern that he may have a small pathologic fracture. With his history of prostate cancer and progressive PSA elevation he was suggested on Degarelix at a dose of 240 mg loading dose followed by 80 mg monthly. His daughter stated he declined systemic therapy. He had received homeopathic therapy for prostate cancer in [place] instead. He subsequently had stroke in 2020. He was very poor historian after stroke with mostly verbal response in yes or no. He has been working with speech therapy after stroke. He stated he did receive radiation to his brain mass previously. On 2/3/2021 C1 Degarelix and Xgeva. Today the patient is here for follow up. He id accompanied by his daughter. He denies any new symptoms. He has tolerated therapy well. The patient otherwise has no other complaints. Denies fever, chills, night sweat, unintentional weight loss, skin lumps or bumps, acute bleeding or bruising issues. Denies headache, acute vision change, dizziness, chest pain, worsen shortness of breath, palpitation, productive cough, nausea, vomiting, abdominal pain, altered bowel habits, dysuria, worsening bone pain or back pain, new focal numbness or weakness.        Past medical history, family history, and social history: these were reviewed and remains unchanged. Past Medical History:   Diagnosis Date    Elevated PSA     Hypercholesterolemia     Hypertension     Stroke (cerebrum) ([facility] [address]      History reviewed. No pertinent surgical history. Social History     Socioeconomic History    Marital status: SINGLE     Spouse name: Not on file    Number of children: Not on file    Years of education: Not on file    Highest education level: Not on file   Occupational History    Not on file   Tobacco Use    Smoking status: Never Smoker    Smokeless tobacco: Never Used   Substance and Sexual Activity    Alcohol use: No    Drug use: Not on file    Sexual activity: Not on file   Other Topics Concern    Not on file   Social History Narrative    Not on file     Social Determinants of Health     Financial Resource Strain:     Difficulty of Paying Living Expenses: Not on file   Food Insecurity:     Worried About Running Out of Food in the Last Year: Not on file    Jesi of Food in the Last Year: Not on file   Transportation Needs:     Lack of Transportation (Medical): Not on file    Lack of Transportation (Non-Medical):  Not on file   Physical Activity:     Days of Exercise per Week: Not on file    Minutes of Exercise per Session: Not on file   Stress:     Feeling of Stress : Not on file   Social Connections:     Frequency of Communication with Friends and Family: Not on file    Frequency of Social Gatherings with Friends and Family: Not on file    Attends [employer] Services: Not on file    Active Member of Clubs or Organizations: Not on file    Attends Club or Organization Meetings: Not on file    Marital Status: Not on file   Intimate Partner Violence:     Fear of Current or Ex-Partner: Not on file    Emotionally Abused: Not on file    Physically Abused: Not on file    Sexually Abused: Not on file   Housing Stability:     Unable to Pay for Housing in the Last Year: Not on file    Number of [place] in the Last Year: Not on file    Unstable Housing in the Last Year: Not on file     Family History   Problem Relation Age of Onset    Diabetes Mother     Hypertension Mother     Heart Disease Mother     Cancer Father     Heart Disease Brother     Diabetes Brother     Heart Disease Brother      Current Outpatient Medications   Medication Sig Dispense Refill    Zytiga 500 mg tab TAKE 2 TABLETS (1,000MG) BY MOUTH ONCE DAILY ON AN  EMPTY STOMACH 1 HOUR BEFORE OR 2 HOURS AFTER A MEAL 90 Tablet 0    predniSONE (DELTASONE) 5 mg tablet Take 1 Tablet by mouth daily. 30 Tablet 3    abiraterone 500 mg tab       tamsulosin (FLOMAX) 0.4 mg capsule take 1 capsule by mouth every evening 90 Cap 0    bumetanide (BUMEX) 2 mg tablet Take 2 mg by mouth daily. 0    CARTIA  mg ER capsule   0    metFORMIN (GLUCOPHAGE) 500 mg tablet Take  by mouth two (2) times daily (with meals).  timolol (TIMOPTIC) 0.5 % ophthalmic solution 1 Drop two (2) times a day.  ELIQUIS 5 mg tablet   0    atorvastatin (LIPITOR) 40 mg tablet take 1 tablet by mouth at bedtime for cholesterol  0    fenofibrate micronized (LOFIBRA) 67 mg capsule take 1 capsule by mouth once daily  0    furosemide (LASIX) 20 mg tablet   0    levETIRAcetam (KEPPRA) 750 mg tablet take 1 tablet by mouth twice a day  0    carvedilol (COREG) 12.5 mg tablet Take  by mouth two (2) times daily (with meals).  losartan-hydrochlorothiazide (HYZAAR) 100-25 mg per tablet Take 1 Tab by mouth daily.  omeprazole (PRILOSEC) 10 mg capsule Take 10 mg by mouth daily. Review of Systems   Constitutional: Negative for chills, diaphoresis, fever, malaise/fatigue and weight loss. Respiratory: Negative for cough, hemoptysis, shortness of breath and wheezing. Cardiovascular: Negative for chest pain, palpitations and leg swelling. Gastrointestinal: Negative for abdominal pain, diarrhea, heartburn, nausea and vomiting.    Genitourinary: Negative for dysuria, frequency, hematuria and urgency. Musculoskeletal: Negative for joint pain and myalgias. Skin: Negative for itching and rash. Neurological: Negative for dizziness, seizures, weakness and headaches. Psychiatric/Behavioral: Negative for depression. The patient does not have insomnia. Objective:     Visit Vitals  BP (!) 130/52   Pulse 60   Resp 18   Ht 5' 8\" (1.727 m)   Wt 84.4 kg (186 lb)   SpO2 100%   BMI 28.28 kg/m²       Physical Exam  Constitutional:       General: He is not in acute distress. HENT:      Head: Normocephalic and atraumatic. Eyes:      Pupils: Pupils are equal, round, and reactive to light. Cardiovascular:      Pulses: Normal pulses. Heart sounds: Normal heart sounds. No murmur heard. Pulmonary:      Effort: Pulmonary effort is normal. No respiratory distress. Breath sounds: Normal breath sounds. Abdominal:      General: Bowel sounds are normal. There is no distension. Palpations: Abdomen is soft. There is no mass. Tenderness: There is no abdominal tenderness. There is no guarding. Musculoskeletal:         General: No swelling or tenderness. Cervical back: Neck supple. No rigidity. Lymphadenopathy:      Cervical: No cervical adenopathy. Skin:     General: Skin is warm. Findings: No rash. Neurological:      Mental Status: He is alert and oriented to person, place, and time. Mental status is at baseline. Cranial Nerves: No cranial nerve deficit. Psychiatric:         Mood and Affect: Mood normal.          Diagnostics:      No results found for this or any previous visit (from the past 96 hour(s)). Imaging:  No results found for this or any previous visit. No results found for this or any previous visit. Results for orders placed in visit on 08/26/21    CT CHEST ABD PELV W CONT    Narrative  EXAM: CT CHEST, ABDOMEN AND PELVIS WITH CONTRAST    CLINICAL INDICATION/HISTORY: Prostate cancer with bone metastases.     COMPARISON: Outside hospital CT chest/abdomen/pelvis 9/28/2020    TECHNIQUE:  CT chest, abdomen and pelvis with IV contrast.  All CT scans at this facility are performed using dose optimization technique as  appropriate to the performed examination, to include automated exposure control,  adjustment of the mA and/or kV according to patient's size (including  appropriate matching for site-specific examinations), or use of an iterative  reconstruction technique. FINDINGS:    Lung/Airways:  6 mm nodule along the right major fissure (4, 30), may be an  intrapulmonary lymph node, stable. Another 4 mm nodule more inferiorly along the  right major fissure (33) may also be an intrapulmonary lymph node, also stable. 4 mm right upper lobe nodule (4, 18), stable. 3 mm right middle lobe nodule  (41), stable. 2 mm right lower lobe nodule (41), not definitely seen on prior. No consolidation or pleural effusion. Base of Neck:  Unremarkable. Mediastinum: 1 cm short axis AP window lymph node. Main pulmonary artery  measures 3.7 cm, suggesting pulmonary arterial hypertension. Heart: Small pericardial effusion, increased from prior. . Extensive coronary  artery calcifications. Mild aortic valve calcification. Liver: Diffuse low-attenuation of the liver likely reflects steatosis. Few  subcentimeter hypodense lesions (for example, measuring 5 mm in the inferior  right hepatic lobe on 3, 23) are too small to characterize, but most likely  cysts or hemangiomas, and stable from prior. 1.6 x 0.9 cm focus of arterial  enhancement in segment 7 (11), stable from prior. Biliary: Unremarkable. Pancreas: Unremarkable. Spleen: Unremarkable. Adrenal glands: Unremarkable. Kidneys: Punctate nonobstructing bilateral renal stones. Mild areas of scarring  bilateral kidneys, right greater than left. 2.5 cm left renal cyst. No  hydronephrosis.     Reproductive organs: Evaluation is significantly limited due to streak artifact  from bilateral hyperechoic opacities. Bladder: Limited evaluation, but grossly unremarkable. Bowel: Tiny hiatal hernia. No evidence for bowel obstruction or inflammation. Lymph nodes: No pathologically enlarged lymph nodes. Vasculature: Extensive burden of calcified atherosclerotic disease, including at  the mesenteric and renal artery ostia. Other: No free fluid or free air. Body wall: Mild bilateral gynecomastia. Tiny fat-containing umbilical hernia. Areas of stranding in the subcutaneous fat of the lower abdominal wall may  reflect contusions or sequela of medication injection. Bilateral hydroceles,  with fluid extending into the bilateral inguinal regions, new on the left, but  stable on the right since 9/28/2020. Bones: Bilateral hip arthroplasties. Moderate T9 compression deformity and mild  T11 compression deformity, both unchanged. Multilevel degenerative disc disease. Subtle sclerosis of the right clavicular head is unchanged from 9/28/2020. Please see nuclear medicine bone scan performed on the same day and dictated  separately. Impression  1. New 2 mm right lower lobe pulmonary nodule, nonspecific. Attention on  follow-up. Other small pulmonary nodules are unchanged from 9/28/2020.    2. Arterially enhancing lesion in the posterior right hepatic lobe measuring 1.6  cm, most likely a flash filling hemangioma, stable from 9/28/2020.  -Few subcentimeter hypodense lesions in the liver are too small to characterize,  but likely cysts, also unchanged. 3. Nonobstructing renal stones bilaterally. 4. Please see nuclear medicine bone scan performed the same day and dictated  separately for complete evaluation. Unchanged subtle sclerosis in the right  clavicular head. 5. Small pericardial effusion is increased from prior. 6. Enlarged main pulmonary artery, unchanged from prior, suggests pulmonary  chill hypertension. 7. Other findings as above. Assessment:     No diagnosis found.   Plan:   # Prostate cancer  # Bone metastases:   -- 12/11/2018 showed that his prostate specific antigen was 7.27 ng/mL. -- Patient was being followed by Dr. Efrain Mayo who just retired. A follow-up PSA and and a bone scan was schedule and the result showed intense uptake involving the medial aspect of the right clavicle. Also there was increased uptake involving the lower thoracic spine at the level of T9 and T11. --The MRI of the thoracic spine shows correlating with abnormal uptake on prior whole body bone scan of 4/12/2019, there are acute/subacute fractures are seen within T9 and T11 vertebral bodies, both of which are almost certainly pathologic given foci of suspicious enhancement. Additional osseous metastatic lesion within left paracentral T2 vertebral body. Multilevel degenerative disc disease, to include ventral thoracic cord impingement at T2-T3 and T6-T7 vertebral body levels. + The MRI of the lumber spine also shows no fracture nor osseous metastatic disease within the lumbar spine or visualized osseous pelvis. Only partially visualized on this exam is the T11 vertebral body pathologic fracture, better evaluated on concurrent thoracic spine MRI. Severe right foraminal stenosis at L3-L4 with impingement of right L3 foraminal nerve root by prominent focal right foraminal disc bulge. Moderate bilateral foraminal stenoses at L5-S1, each with abutment of respective L5 foraminal nerve roots by adjacent foraminal disc. Moderate L3-L4 and mild L4-L5 canal stenoses. -- 4/2019 He was suggested on Degarelix at a dose of 240 mg loading dose followed by 80 mg monthly. He declined systemic chemotherapy. Subsequently he had received homeopathic therapy for prostate cancer in [place] instead. -- He had stroke in March 2020. He was poor historian after stroke with mostly verbal response only in yes or no. He has been working with speech therapy after stroke.     -- 7/14/2020 PSA was elevated from 3.7 to 7.5  -- 9/28/2020 CT C/A/P reported no findings to suggest interval progression of metastatic disease.  -- 9/15/2020 Bone Scan reported persistent moderate to intense activity involving the medial aspect of both clavicular heads. Less intense uptake involving the right anterior fourth rib as well as T9 and T11 vertebra. No new foci of uptake is identified to strongly suggest progression of osseous metastatic disease. -- 11/5/2020 PSA was 7.4  -- He has seen Dr. Verena Andrews who did not see any current role for radiation as his urinary function is excellent and he has metastatic disease that is asymptomatic. He was agreeable with re-initiation of ADT given his excellent response at pervious time. He continues to decline chemotherapy. -- 2/3/2021 C1 Degarelix and Xgeva at \[facility]". -- 4/8/2021 PSA 4.8.  -- 6/22/2021 PSA 4.2  -- 8/17/2021 PSA 6.6   -- 9/2/2021 CT New 2 mm right lower lobe pulmonary nodule, nonspecific. Attention on follow-up. Other small pulmonary nodules are unchanged from 9/28/2020.  + Arterially enhancing lesion in the posterior right hepatic lobe measuring 1.6  cm, most likely a flash filling hemangioma, stable from 9/28/2020.  + Few subcentimeter hypodense lesions in the liver are too small to characterize,  but likely cysts, also unchanged. -- 9/2/2021 Bone scan reported no definite scintigraphic evidence of osseous metastatic disease. Subtle focal uptake seen at the posterior T11 vertebral body may correspond to a compression deformity at this location seen on reference imaging. Suggests chronic fracture. -- Patient started Abiraterone and Prednisone combo on 10/13/21. He has tolerated therapy without high graded toxicities. Clinical stable.   --01/28/2021 PSA 5.930  --09/22/2021 PSA 3.620  --12/15/2021 PSA 0.400  --03/26/2022 PSA 0.050    Plan:  -- He will continue Xgeva injection at \[facility]\"" as scheduled.   -- He will continue Degarelix maintenance dose 80mg every 4 weeks; Abiraterone 1,000mg once daily in combination with Prednisone 5mg once daily. -- Lab check: CBC, CMP, PSA/Testosterone. -- We will see the patient back in clinic in about 8 weeks. Always sooner if required. -- Repeat CT CAP and NMN Bone Scan- Patient prefers to be done at [facility]    Degarelix  Day 1: Degarelix 240mg in one 4-week cycle, followed by:  Day 1: Degarelix 80mg (administered as 1 subcutaneous 4mL injection). Repeat cycle every 4 weeks. Nonobstructing renal stones bilaterally. -- Asymptomatic. --The patient will f/u his PCP/Urology for further management. Anemia associated with chronic renal failure, stage III:  Hx Iron deficiency anemia   --The patient has been scheduled to receive Procrit therapy whenever his hemoglobin is below 10g/dL and hematocrit is below 30%. --Recent CBC and CMP reviewed. We will repeat CBC, Iron Profiles/Ferritin periodically. Follow up in 2 months with repeat labs or sooner if indicated. Orders Placed This Encounter    CBC WITH AUTOMATED DIFF     Standing Status:   Future     Standing Expiration Date:   3/30/2023    FERRITIN     Standing Status:   Future     Standing Expiration Date:   4/32/3858    METABOLIC PANEL, COMPREHENSIVE     Standing Status:   Future     Standing Expiration Date:   3/30/2023    IRON PROFILE     Standing Status:   Future     Standing Expiration Date:   3/30/2023    PROSTATE SPECIFIC AG     Standing Status:   Future     Standing Expiration Date:   3/30/2023         Mr. Veena Prince has a reminder for a \"due or due soon\" health maintenance. I have asked that he contact his primary care provider for follow-up on this health maintenance. All of patient's questions answered to their apparent satisfaction. They verbally show understanding and agreement with aforementioned plan. David Olmedo MD  3/29/2022            Parts of this document has been produced using Dragon dictation system. Unrecognized errors in transcription may be present.  Please do not hesitate to reach out for any questions or clarifications.       CC: Tres Tidwell MD

## 2022-05-04 DIAGNOSIS — C61 PROSTATE CANCER (HCC): ICD-10-CM

## 2022-05-04 DIAGNOSIS — C79.51 BONE METASTASES (HCC): ICD-10-CM

## 2022-05-04 DIAGNOSIS — C79.51 PROSTATE CANCER METASTATIC TO BONE (HCC): ICD-10-CM

## 2022-05-04 DIAGNOSIS — C61 PROSTATE CANCER METASTATIC TO BONE (HCC): ICD-10-CM

## 2022-05-13 RX ORDER — ABIRATERONE ACETATE 500 MG/1
TABLET, FILM COATED ORAL
Qty: 60 TABLET | Refills: 1 | Status: SHIPPED | OUTPATIENT
Start: 2022-05-13 | End: 2022-06-29

## 2022-05-16 RX ORDER — PREDNISONE 5 MG/1
5 TABLET ORAL DAILY
Qty: 30 TABLET | Refills: 3 | Status: SHIPPED | OUTPATIENT
Start: 2022-05-16 | End: 2022-10-19 | Stop reason: SDUPTHER

## 2022-05-17 ENCOUNTER — APPOINTMENT (OUTPATIENT)
Dept: ONCOLOGY | Age: 82
End: 2022-05-17

## 2022-05-17 ENCOUNTER — HOSPITAL ENCOUNTER (OUTPATIENT)
Dept: LAB | Age: 82
Discharge: HOME | End: 2022-05-17
Payer: MEDICARE

## 2022-05-17 DIAGNOSIS — D63.1 ANEMIA, CHRONIC RENAL FAILURE, STAGE 3 (MODERATE) (HCC): ICD-10-CM

## 2022-05-17 DIAGNOSIS — C79.51 PROSTATE CANCER METASTATIC TO BONE (HCC): ICD-10-CM

## 2022-05-17 DIAGNOSIS — C61 PROSTATE CANCER METASTATIC TO BONE (HCC): ICD-10-CM

## 2022-05-17 DIAGNOSIS — N18.30 ANEMIA, CHRONIC RENAL FAILURE, STAGE 3 (MODERATE) (HCC): ICD-10-CM

## 2022-05-17 LAB
ALBUMIN SERPL-MCNC: 3.6 G/DL (ref 3.4–5)
ALBUMIN/GLOB SERPL: 1.2 {RATIO} (ref 0.8–1.7)
ALP SERPL-CCNC: 77 U/L (ref 45–117)
ALT SERPL-CCNC: 24 U/L (ref 16–61)
ANION GAP SERPL CALC-SCNC: 4 MMOL/L (ref 3–18)
AST SERPL-CCNC: 21 U/L (ref 10–38)
BASOPHILS # BLD: 0 K/UL (ref 0–0.1)
BASOPHILS NFR BLD: 1 % (ref 0–2)
BILIRUB SERPL-MCNC: 0.6 MG/DL (ref 0.2–1)
BUN SERPL-MCNC: 19 MG/DL (ref 7–18)
BUN/CREAT SERPL: 15 (ref 12–20)
CALCIUM SERPL-MCNC: 9.5 MG/DL (ref 8.5–10.1)
CHLORIDE SERPL-SCNC: 111 MMOL/L (ref 100–111)
CO2 SERPL-SCNC: 30 MMOL/L (ref 21–32)
CREAT SERPL-MCNC: 1.24 MG/DL (ref 0.6–1.3)
DIFFERENTIAL METHOD BLD: ABNORMAL
EOSINOPHIL # BLD: 0.3 K/UL (ref 0–0.4)
EOSINOPHIL NFR BLD: 7 % (ref 0–5)
ERYTHROCYTE [DISTWIDTH] IN BLOOD BY AUTOMATED COUNT: 14.6 % (ref 11.6–14.5)
FERRITIN SERPL-MCNC: 382 NG/ML (ref 8–388)
GLOBULIN SER CALC-MCNC: 3.1 G/DL (ref 2–4)
GLUCOSE SERPL-MCNC: 112 MG/DL (ref 74–99)
HCT VFR BLD AUTO: 35.9 % (ref 36–48)
HGB BLD-MCNC: 11.2 G/DL (ref 13–16)
IMM GRANULOCYTES # BLD AUTO: 0 K/UL (ref 0–0.04)
IMM GRANULOCYTES NFR BLD AUTO: 0 % (ref 0–0.5)
IRON SATN MFR SERPL: 17 % (ref 20–50)
IRON SERPL-MCNC: 35 UG/DL (ref 50–175)
LYMPHOCYTES # BLD: 1.1 K/UL (ref 0.9–3.6)
LYMPHOCYTES NFR BLD: 25 % (ref 21–52)
MCH RBC QN AUTO: 29.7 PG (ref 24–34)
MCHC RBC AUTO-ENTMCNC: 31.2 G/DL (ref 31–37)
MCV RBC AUTO: 95.2 FL (ref 78–100)
MONOCYTES # BLD: 0.6 K/UL (ref 0.05–1.2)
MONOCYTES NFR BLD: 14 % (ref 3–10)
NEUTS SEG # BLD: 2.3 K/UL (ref 1.8–8)
NEUTS SEG NFR BLD: 53 % (ref 40–73)
NRBC # BLD: 0 K/UL (ref 0–0.01)
NRBC BLD-RTO: 0 PER 100 WBC
PLATELET # BLD AUTO: 120 K/UL (ref 135–420)
PMV BLD AUTO: 12.7 FL (ref 9.2–11.8)
POTASSIUM SERPL-SCNC: 4.7 MMOL/L (ref 3.5–5.5)
PROT SERPL-MCNC: 6.7 G/DL (ref 6.4–8.2)
PSA SERPL-MCNC: 0 NG/ML (ref 0–4)
RBC # BLD AUTO: 3.77 M/UL (ref 4.35–5.65)
SODIUM SERPL-SCNC: 145 MMOL/L (ref 136–145)
TIBC SERPL-MCNC: 204 UG/DL (ref 250–450)
WBC # BLD AUTO: 4.4 K/UL (ref 4.6–13.2)

## 2022-05-17 PROCEDURE — 83540 ASSAY OF IRON: CPT

## 2022-05-17 PROCEDURE — 84153 ASSAY OF PSA TOTAL: CPT

## 2022-05-17 PROCEDURE — 36415 COLL VENOUS BLD VENIPUNCTURE: CPT

## 2022-05-17 PROCEDURE — 85025 COMPLETE CBC W/AUTO DIFF WBC: CPT

## 2022-05-17 PROCEDURE — 80053 COMPREHEN METABOLIC PANEL: CPT

## 2022-05-17 PROCEDURE — 82728 ASSAY OF FERRITIN: CPT

## 2022-05-31 ENCOUNTER — OFFICE VISIT (OUTPATIENT)
Dept: ONCOLOGY | Age: 82
End: 2022-05-31
Payer: MEDICARE

## 2022-05-31 VITALS
OXYGEN SATURATION: 95 % | DIASTOLIC BLOOD PRESSURE: 54 MMHG | HEART RATE: 73 BPM | HEIGHT: 68 IN | WEIGHT: 183 LBS | SYSTOLIC BLOOD PRESSURE: 130 MMHG | RESPIRATION RATE: 18 BRPM | BODY MASS INDEX: 27.74 KG/M2

## 2022-05-31 DIAGNOSIS — C61 PROSTATE CANCER METASTATIC TO BONE (HCC): Primary | ICD-10-CM

## 2022-05-31 DIAGNOSIS — C79.51 PROSTATE CANCER METASTATIC TO BONE (HCC): Primary | ICD-10-CM

## 2022-05-31 DIAGNOSIS — D50.8 IRON DEFICIENCY ANEMIA SECONDARY TO INADEQUATE DIETARY IRON INTAKE: ICD-10-CM

## 2022-05-31 DIAGNOSIS — D63.1 ANEMIA, CHRONIC RENAL FAILURE, STAGE 3 (MODERATE) (HCC): ICD-10-CM

## 2022-05-31 DIAGNOSIS — N18.30 ANEMIA, CHRONIC RENAL FAILURE, STAGE 3 (MODERATE) (HCC): ICD-10-CM

## 2022-05-31 DIAGNOSIS — Z79.899 ON ANTINEOPLASTIC CHEMOTHERAPY: ICD-10-CM

## 2022-05-31 DIAGNOSIS — C79.51 BONE METASTASES (HCC): ICD-10-CM

## 2022-05-31 DIAGNOSIS — N20.0 BILATERAL RENAL STONES: ICD-10-CM

## 2022-05-31 PROCEDURE — G8754 DIAS BP LESS 90: HCPCS | Performed by: INTERNAL MEDICINE

## 2022-05-31 PROCEDURE — G8536 NO DOC ELDER MAL SCRN: HCPCS | Performed by: INTERNAL MEDICINE

## 2022-05-31 PROCEDURE — 1101F PT FALLS ASSESS-DOCD LE1/YR: CPT | Performed by: INTERNAL MEDICINE

## 2022-05-31 PROCEDURE — G8417 CALC BMI ABV UP PARAM F/U: HCPCS | Performed by: INTERNAL MEDICINE

## 2022-05-31 PROCEDURE — 1123F ACP DISCUSS/DSCN MKR DOCD: CPT | Performed by: INTERNAL MEDICINE

## 2022-05-31 PROCEDURE — G8432 DEP SCR NOT DOC, RNG: HCPCS | Performed by: INTERNAL MEDICINE

## 2022-05-31 PROCEDURE — G8427 DOCREV CUR MEDS BY ELIG CLIN: HCPCS | Performed by: INTERNAL MEDICINE

## 2022-05-31 PROCEDURE — 99214 OFFICE O/P EST MOD 30 MIN: CPT | Performed by: INTERNAL MEDICINE

## 2022-05-31 PROCEDURE — G0463 HOSPITAL OUTPT CLINIC VISIT: HCPCS | Performed by: INTERNAL MEDICINE

## 2022-05-31 PROCEDURE — G8752 SYS BP LESS 140: HCPCS | Performed by: INTERNAL MEDICINE

## 2022-05-31 NOTE — PROGRESS NOTES
Hematology/Oncology  Progress Note    NAME Mason Us  : 1940    PCP: Ani Vaughan MD     Mr. Eli Briceno is a [de-identified] y.o. man who is here for reassessment of his anemia associated with Chronic Kidney Disease, Prostate Cancer     Subjective:   Mr. Eli Briceno is a 81-year-old man who has history of Prostate Cancer and Anemia associated with Chronic Kidney Disease. Per chart review, he had an elevation in his PSA to 7 ng/mL then was referred for a bone scan on 2019. This test showed intense uptake involving the medial aspect of the right clavicle. Also there was increased uptake involving the lower thoracic spine at the level of T9 and T11. There was some concern that he may have a small pathologic fracture. With his history of prostate cancer and progressive PSA elevation he was suggested on Degarelix at a dose of 240 mg loading dose followed by 80 mg monthly. His daughter stated he declined systemic therapy. He had received homeopathic therapy for prostate cancer in [place] instead. He subsequently had stroke in 2020. He was very poor historian after stroke with mostly verbal response in yes or no. He has been working with speech therapy after stroke. He stated he did receive radiation to his brain mass previously. On 2/3/2021 C1 Degarelix and Xgeva. Today the patient is here for follow up. He id accompanied by his daughter. He denies any new symptoms. He has tolerated therapy well. The patient otherwise has no other complaints. Denies fever, chills, night sweat, unintentional weight loss, skin lumps or bumps, acute bleeding or bruising issues. Denies headache, acute vision change, dizziness, chest pain, worsen shortness of breath, palpitation, productive cough, nausea, vomiting, abdominal pain, altered bowel habits, dysuria, worsening bone pain or back pain, new focal numbness or weakness.        Past medical history, family history, and social history: these were reviewed and remains unchanged. Past Medical History:   Diagnosis Date    Elevated PSA     Hypercholesterolemia     Hypertension     Stroke (cerebrum) ([facility] [address]      History reviewed. No pertinent surgical history. Social History     Socioeconomic History    Marital status: SINGLE     Spouse name: Not on file    Number of children: Not on file    Years of education: Not on file    Highest education level: Not on file   Occupational History    Not on file   Tobacco Use    Smoking status: Never Smoker    Smokeless tobacco: Never Used   Substance and Sexual Activity    Alcohol use: No    Drug use: Not on file    Sexual activity: Not on file   Other Topics Concern    Not on file   Social History Narrative    Not on file     Social Determinants of Health     Financial Resource Strain:     Difficulty of Paying Living Expenses: Not on file   Food Insecurity:     Worried About Running Out of Food in the Last Year: Not on file    Jesi of Food in the Last Year: Not on file   Transportation Needs:     Lack of Transportation (Medical): Not on file    Lack of Transportation (Non-Medical):  Not on file   Physical Activity:     Days of Exercise per Week: Not on file    Minutes of Exercise per Session: Not on file   Stress:     Feeling of Stress : Not on file   Social Connections:     Frequency of Communication with Friends and Family: Not on file    Frequency of Social Gatherings with Friends and Family: Not on file    Attends Cheondoism Services: Not on file    Active Member of Clubs or Organizations: Not on file    Attends Club or Organization Meetings: Not on file    Marital Status: Not on file   Intimate Partner Violence:     Fear of Current or Ex-Partner: Not on file    Emotionally Abused: Not on file    Physically Abused: Not on file    Sexually Abused: Not on file   Housing Stability:     Unable to Pay for Housing in the Last Year: Not on file    Number of [place] in the Last Year: Not on file  Unstable Housing in the Last Year: Not on file     Family History   Problem Relation Age of Onset    Diabetes Mother     Hypertension Mother     Heart Disease Mother     Cancer Father     Heart Disease Brother     Diabetes Brother     Heart Disease Brother      Current Outpatient Medications   Medication Sig Dispense Refill    ferrous sulfate (SLOW FE) 142 mg (45 mg iron) ER tablet Take 1 Tablet by mouth every other day. 45 Tablet 1    predniSONE (DELTASONE) 5 mg tablet Take 1 Tablet by mouth daily. 30 Tablet 3    Zytiga 500 mg tab TAKE 2 TABLETS (1,000MG) BY MOUTH ONCE DAILY ON AN  EMPTY STOMACH 1 HOUR BEFORE OR 2 HOURS AFTER A MEAL 60 Tablet 1    abiraterone 500 mg tab       tamsulosin (FLOMAX) 0.4 mg capsule take 1 capsule by mouth every evening 90 Cap 0    bumetanide (BUMEX) 2 mg tablet Take 2 mg by mouth daily. 0    CARTIA  mg ER capsule   0    metFORMIN (GLUCOPHAGE) 500 mg tablet Take  by mouth two (2) times daily (with meals).  timolol (TIMOPTIC) 0.5 % ophthalmic solution 1 Drop two (2) times a day.  ELIQUIS 5 mg tablet   0    atorvastatin (LIPITOR) 40 mg tablet take 1 tablet by mouth at bedtime for cholesterol  0    fenofibrate micronized (LOFIBRA) 67 mg capsule take 1 capsule by mouth once daily  0    furosemide (LASIX) 20 mg tablet   0    levETIRAcetam (KEPPRA) 750 mg tablet take 1 tablet by mouth twice a day  0    carvedilol (COREG) 12.5 mg tablet Take  by mouth two (2) times daily (with meals).  losartan-hydrochlorothiazide (HYZAAR) 100-25 mg per tablet Take 1 Tab by mouth daily.  omeprazole (PRILOSEC) 10 mg capsule Take 10 mg by mouth daily. Review of Systems   Constitutional: Negative for chills, diaphoresis, fever, malaise/fatigue and weight loss. Respiratory: Negative for cough, hemoptysis, shortness of breath and wheezing. Cardiovascular: Negative for chest pain, palpitations and leg swelling.    Gastrointestinal: Negative for abdominal pain, diarrhea, heartburn, nausea and vomiting. Genitourinary: Negative for dysuria, frequency, hematuria and urgency. Musculoskeletal: Negative for joint pain and myalgias. Skin: Negative for itching and rash. Neurological: Negative for dizziness, seizures, weakness and headaches. Psychiatric/Behavioral: Negative for depression. The patient does not have insomnia. Objective:     Visit Vitals  BP (!) 130/54   Pulse 73   Resp 18   Ht 5' 8\" (1.727 m)   Wt 83 kg (183 lb)   SpO2 95%   BMI 27.83 kg/m²       Physical Exam  Constitutional:       General: He is not in acute distress. HENT:      Head: Normocephalic and atraumatic. Eyes:      Pupils: Pupils are equal, round, and reactive to light. Cardiovascular:      Pulses: Normal pulses. Heart sounds: Normal heart sounds. No murmur heard. Pulmonary:      Effort: Pulmonary effort is normal. No respiratory distress. Breath sounds: Normal breath sounds. Abdominal:      General: Bowel sounds are normal. There is no distension. Palpations: Abdomen is soft. There is no mass. Tenderness: There is no abdominal tenderness. There is no guarding. Musculoskeletal:         General: No swelling or tenderness. Cervical back: Neck supple. No rigidity. Lymphadenopathy:      Cervical: No cervical adenopathy. Skin:     General: Skin is warm. Findings: No rash. Neurological:      Mental Status: He is alert and oriented to person, place, and time. Mental status is at baseline. Cranial Nerves: No cranial nerve deficit. Psychiatric:         Mood and Affect: Mood normal.          Diagnostics:      No results found for this or any previous visit (from the past 96 hour(s)). Imaging:  No results found for this or any previous visit. No results found for this or any previous visit.       Results for orders placed in visit on 08/26/21    CT CHEST ABD PELV W CONT    Narrative  EXAM: CT CHEST, ABDOMEN AND PELVIS WITH CONTRAST    CLINICAL INDICATION/HISTORY: Prostate cancer with bone metastases. COMPARISON: Outside hospital CT chest/abdomen/pelvis 9/28/2020    TECHNIQUE:  CT chest, abdomen and pelvis with IV contrast.  All CT scans at this facility are performed using dose optimization technique as  appropriate to the performed examination, to include automated exposure control,  adjustment of the mA and/or kV according to patient's size (including  appropriate matching for site-specific examinations), or use of an iterative  reconstruction technique. FINDINGS:    Lung/Airways:  6 mm nodule along the right major fissure (4, 30), may be an  intrapulmonary lymph node, stable. Another 4 mm nodule more inferiorly along the  right major fissure (33) may also be an intrapulmonary lymph node, also stable. 4 mm right upper lobe nodule (4, 18), stable. 3 mm right middle lobe nodule  (41), stable. 2 mm right lower lobe nodule (41), not definitely seen on prior. No consolidation or pleural effusion. Base of Neck:  Unremarkable. Mediastinum: 1 cm short axis AP window lymph node. Main pulmonary artery  measures 3.7 cm, suggesting pulmonary arterial hypertension. Heart: Small pericardial effusion, increased from prior. . Extensive coronary  artery calcifications. Mild aortic valve calcification. Liver: Diffuse low-attenuation of the liver likely reflects steatosis. Few  subcentimeter hypodense lesions (for example, measuring 5 mm in the inferior  right hepatic lobe on 3, 23) are too small to characterize, but most likely  cysts or hemangiomas, and stable from prior. 1.6 x 0.9 cm focus of arterial  enhancement in segment 7 (11), stable from prior. Biliary: Unremarkable. Pancreas: Unremarkable. Spleen: Unremarkable. Adrenal glands: Unremarkable. Kidneys: Punctate nonobstructing bilateral renal stones. Mild areas of scarring  bilateral kidneys, right greater than left.  2.5 cm left renal cyst. No  hydronephrosis. Reproductive organs: Evaluation is significantly limited due to streak artifact  from bilateral hyperechoic opacities. Bladder: Limited evaluation, but grossly unremarkable. Bowel: Tiny hiatal hernia. No evidence for bowel obstruction or inflammation. Lymph nodes: No pathologically enlarged lymph nodes. Vasculature: Extensive burden of calcified atherosclerotic disease, including at  the mesenteric and renal artery ostia. Other: No free fluid or free air. Body wall: Mild bilateral gynecomastia. Tiny fat-containing umbilical hernia. Areas of stranding in the subcutaneous fat of the lower abdominal wall may  reflect contusions or sequela of medication injection. Bilateral hydroceles,  with fluid extending into the bilateral inguinal regions, new on the left, but  stable on the right since 9/28/2020. Bones: Bilateral hip arthroplasties. Moderate T9 compression deformity and mild  T11 compression deformity, both unchanged. Multilevel degenerative disc disease. Subtle sclerosis of the right clavicular head is unchanged from 9/28/2020. Please see nuclear medicine bone scan performed on the same day and dictated  separately. Impression  1. New 2 mm right lower lobe pulmonary nodule, nonspecific. Attention on  follow-up. Other small pulmonary nodules are unchanged from 9/28/2020.    2. Arterially enhancing lesion in the posterior right hepatic lobe measuring 1.6  cm, most likely a flash filling hemangioma, stable from 9/28/2020.  -Few subcentimeter hypodense lesions in the liver are too small to characterize,  but likely cysts, also unchanged. 3. Nonobstructing renal stones bilaterally. 4. Please see nuclear medicine bone scan performed the same day and dictated  separately for complete evaluation. Unchanged subtle sclerosis in the right  clavicular head. 5. Small pericardial effusion is increased from prior.     6. Enlarged main pulmonary artery, unchanged from prior, suggests pulmonary  chill hypertension. 7. Other findings as above. Assessment:     1. Prostate cancer metastatic to bone ([address]    2. Bone metastases ([address]    3. Bilateral renal stones    4. Anemia, chronic renal failure, stage 3 (moderate) (HCC)    5. 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    6. On antineoplastic chemotherapy      Plan:   # Prostate cancer  # Bone metastases:   -- 12/11/2018 showed that his prostate specific antigen was 7.27 ng/mL. -- Patient was being followed by Dr. Mik Hewitt who just retired. A follow-up PSA and and a bone scan was schedule and the result showed intense uptake involving the medial aspect of the right clavicle. Also there was increased uptake involving the lower thoracic spine at the level of T9 and T11. --The MRI of the thoracic spine shows correlating with abnormal uptake on prior whole body bone scan of 4/12/2019, there are acute/subacute fractures are seen within T9 and T11 vertebral bodies, both of which are almost certainly pathologic given foci of suspicious enhancement. Additional osseous metastatic lesion within left paracentral T2 vertebral body. Multilevel degenerative disc disease, to include ventral thoracic cord impingement at T2-T3 and T6-T7 vertebral body levels. + The MRI of the lumber spine also shows no fracture nor osseous metastatic disease within the lumbar spine or visualized osseous pelvis. Only partially visualized on this exam is the T11 vertebral body pathologic fracture, better evaluated on concurrent thoracic spine MRI. Severe right foraminal stenosis at L3-L4 with impingement of right L3 foraminal nerve root by prominent focal right foraminal disc bulge. Moderate bilateral foraminal stenoses at L5-S1, each with abutment of respective L5 foraminal nerve roots by adjacent foraminal disc. Moderate L3-L4 and mild L4-L5 canal stenoses.   -- 4/2019 He was suggested on Degarelix at a dose of 240 mg loading dose followed by 80 mg monthly. He declined systemic chemotherapy. Subsequently he had received homeopathic therapy for prostate cancer in [place] instead. -- He had stroke in March 2020. He was poor historian after stroke with mostly verbal response only in yes or no. He has been working with speech therapy after stroke. -- 7/14/2020 PSA was elevated from 3.7 to 7.5  -- 9/28/2020 CT C/A/P reported no findings to suggest interval progression of metastatic disease.  -- 9/15/2020 Bone Scan reported persistent moderate to intense activity involving the medial aspect of both clavicular heads. Less intense uptake involving the right anterior fourth rib as well as T9 and T11 vertebra. No new foci of uptake is identified to strongly suggest progression of osseous metastatic disease. -- 11/5/2020 PSA was 7.4  -- He has seen Dr. Jesse Hodge who did not see any current role for radiation as his urinary function is excellent and he has metastatic disease that is asymptomatic. He was agreeable with re-initiation of ADT given his excellent response at pervious time. He continues to decline chemotherapy. -- 2/3/2021 C[facility] Degarelix [facility]geva at [facility]. -- 4/8/2021 PSA 4.8.  -- 6/22/2021 PSA 4.2  -- 8/17/2021 PSA 6.6   -- 9/2/2021 CT New 2 mm right lower lobe pulmonary nodule, nonspecific. Attention on follow-up. Other small pulmonary nodules are unchanged from 9/28/2020.  + Arterially enhancing lesion in the posterior right hepatic lobe measuring 1.6  cm, most likely a flash filling hemangioma, stable from 9/28/2020.  + Few subcentimeter hypodense lesions in the liver are too small to characterize,  but likely cysts, also unchanged. --9/2/2021 Bone scan reported no definite scintigraphic evidence of osseous metastatic disease. Subtle focal uptake seen at the posterior T11 vertebral body may correspond to a compression deformity at this location seen on reference imaging. Suggests chronic fracture.   -- Patient started Abiraterone and Prednisone combo on 10/13/21. He has tolerated therapy without high graded toxicities. Clinical stable.   --01/28/2021 PSA 5.930  --09/22/2021 PSA 3.620  --12/15/2021 PSA 0.400  --03/26/2022 PSA 0.050  --05/17/2022 PSA 0.0  --05/05/2022: CT C/A/P reported no metastatic disease identified in the abdomen or pelvis.  + Interval increase in number of small pulmonary nodules bilaterally which measure less than 6 mm in size. Some of these are centrilobular in location and others demonstrate mild surrounding groundglass density suggesting these could be related to infectious or inflammatory bronchiolitis rather than metastatic disease. Recommend attention on follow-up imaging.   --05/05/2022: Bone Scan showed no evidence of osseous metastatic disease. -- He ha tolerated therapy without high graded toxicities. Plan:  --He will continue Xgeva injection at [facility] as scheduled. --He will continue Degarelix maintenance dose 80mg every 4 weeks; Abiraterone 1,000mg once daily in combination with Prednisone 5mg once daily. --Lab check: CBC, CMP, PSA/Testosterone. --We will see the patient back in clinic in about 8 weeks. Always sooner if required. Degarelix  Day 1: Degarelix 240mg in one 4-week cycle, followed by:  Day 1: Degarelix 80mg (administered as 1 subcutaneous 4mL injection). Repeat cycle every 4 weeks. Nonobstructing Renal Stones Bilaterally. --Asymptomatic. --The patient will f/u his PCP/Urology for further management. Anemia associated with Chronic Renal Failure, Stage III:  Iron Deficiency Anemia   --The patient has been scheduled to receive Procrit therapy whenever his hemoglobin is below 10g/dL. 05/17/2022: CBC showed WBC 4.4, hemoglobin 11.2g/dL and hematocrit 35.9%. Platelet 668. Ferritin 382. Iron Sat 17%. BUN 19 and Creatinine 1.24  --Advised patient to take oral iron supplementation every other day prior to breakfast.  --Sent Slow Fe Rx to his pharmacy of choice. --Patient and his daughter verbalized understanding and agreed with the plan. Follow up in 2 months with repeat labs or sooner if indicated. Orders Placed This Encounter    CBC WITH AUTOMATED DIFF     Standing Status:   Future     Standing Expiration Date:   6/1/2023    IRON PROFILE     Standing Status:   Future     Standing Expiration Date:   4/7/6230    METABOLIC PANEL, COMPREHENSIVE     Standing Status:   Future     Standing Expiration Date:   6/1/2023    FERRITIN     Standing Status:   Future     Standing Expiration Date:   6/1/2023    PROSTATE SPECIFIC AG     Standing Status:   Future     Standing Expiration Date:   6/1/2023    ferrous sulfate (SLOW FE) 142 mg (45 mg iron) ER tablet     Sig: Take 1 Tablet by mouth every other day. Dispense:  45 Tablet     Refill:  1         Mr. Stefan Schaefer has a reminder for a \"due or due soon\" health maintenance. I have asked that he contact his primary care provider for follow-up on this health maintenance. All of patient's questions answered to their apparent satisfaction. They verbally show understanding and agreement with aforementioned plan. Jany Simon MD  5/31/2022        Parts of this document has been produced using Dragon dictation system. Unrecognized errors in transcription may be present. Please do not hesitate to reach out for any questions or clarifications.       CC: Indra Mireles MD

## 2022-06-28 DIAGNOSIS — C79.51 BONE METASTASES (HCC): ICD-10-CM

## 2022-06-28 DIAGNOSIS — C61 PROSTATE CANCER METASTATIC TO BONE (HCC): ICD-10-CM

## 2022-06-28 DIAGNOSIS — C61 PROSTATE CANCER (HCC): ICD-10-CM

## 2022-06-28 DIAGNOSIS — C79.51 PROSTATE CANCER METASTATIC TO BONE (HCC): ICD-10-CM

## 2022-06-29 RX ORDER — ABIRATERONE ACETATE 500 MG/1
TABLET, FILM COATED ORAL
Qty: 60 TABLET | Refills: 1 | Status: SHIPPED | OUTPATIENT
Start: 2022-06-29 | End: 2022-09-16

## 2022-07-26 LAB
A-G RATIO,AGRAT: 1.4 RATIO (ref 1.1–2.6)
ABSOLUTE LYMPHOCYTE COUNT, 10803: 0.8 K/UL (ref 1–4.8)
ALBUMIN SERPL-MCNC: 3.8 G/DL (ref 3.5–5)
ALP SERPL-CCNC: 64 U/L (ref 40–125)
ALT SERPL-CCNC: 13 U/L (ref 5–40)
ANION GAP SERPL CALC-SCNC: 12 MMOL/L (ref 3–15)
AST SERPL W P-5'-P-CCNC: 26 U/L (ref 10–37)
BASOPHILS # BLD: 0 K/UL (ref 0–0.2)
BASOPHILS NFR BLD: 1 % (ref 0–2)
BILIRUB SERPL-MCNC: 0.5 MG/DL (ref 0.2–1.2)
BUN SERPL-MCNC: 17 MG/DL (ref 6–22)
CALCIUM SERPL-MCNC: 8.8 MG/DL (ref 8.4–10.5)
CHLORIDE SERPL-SCNC: 104 MMOL/L (ref 98–110)
CO2 SERPL-SCNC: 24 MMOL/L (ref 20–32)
CREAT SERPL-MCNC: 1.2 MG/DL (ref 0.8–1.6)
EOSINOPHIL # BLD: 0.3 K/UL (ref 0–0.5)
EOSINOPHIL NFR BLD: 10 % (ref 0–6)
ERYTHROCYTE [DISTWIDTH] IN BLOOD BY AUTOMATED COUNT: 14 % (ref 10–15.5)
FE % SATURATION,PSAT: 20 % (ref 20–50)
FERRITIN SERPL-MCNC: 632 NG/ML (ref 22–322)
GLOBULIN,GLOB: 2.7 G/DL (ref 2–4)
GLOMERULAR FILTRATION RATE: 58.8 ML/MIN/1.73 SQ.M.
GLUCOSE SERPL-MCNC: 120 MG/DL (ref 70–99)
GRANULOCYTES,GRANS: 40 % (ref 40–75)
HCT VFR BLD AUTO: 33.5 % (ref 37.8–52.2)
HGB BLD-MCNC: 11.1 G/DL (ref 12.6–17.1)
IMMATURE PLATELET FRACTION: 4.6 % (ref 1.1–7.1)
IRON,IRN: 35 MCG/DL (ref 45–160)
LYMPHOCYTES, LYMLT: 29 % (ref 20–45)
MCH RBC QN AUTO: 30 PG (ref 26–34)
MCHC RBC AUTO-ENTMCNC: 33 G/DL (ref 31–36)
MCV RBC AUTO: 91 FL (ref 80–95)
MONOCYTES # BLD: 0.6 K/UL (ref 0.1–1)
MONOCYTES NFR BLD: 20 % (ref 3–12)
NEUTROPHILS # BLD AUTO: 1.1 K/UL (ref 1.8–7.7)
PLATELET # BLD AUTO: 112 K/UL (ref 140–440)
PMV BLD AUTO: 11.7 FL (ref 9–13)
POTASSIUM SERPL-SCNC: 3.7 MMOL/L (ref 3.5–5.5)
PROT SERPL-MCNC: 6.5 G/DL (ref 6.2–8.1)
PSA SERPL-MCNC: 0.02 NG/ML
RBC # BLD AUTO: 3.69 M/UL (ref 3.8–5.8)
SODIUM SERPL-SCNC: 140 MMOL/L (ref 133–145)
TIBC,TIBC: 175 MCG/DL (ref 228–428)
UIBC SERPL-MCNC: 140 MCG/DL (ref 110–370)
WBC # BLD AUTO: 2.8 K/UL (ref 4–11)

## 2022-08-19 ENCOUNTER — OFFICE VISIT (OUTPATIENT)
Dept: ONCOLOGY | Age: 82
End: 2022-08-19
Payer: MEDICARE

## 2022-08-19 VITALS
WEIGHT: 183.2 LBS | HEART RATE: 71 BPM | HEIGHT: 69 IN | BODY MASS INDEX: 27.13 KG/M2 | TEMPERATURE: 97.8 F | SYSTOLIC BLOOD PRESSURE: 103 MMHG | DIASTOLIC BLOOD PRESSURE: 60 MMHG | OXYGEN SATURATION: 96 %

## 2022-08-19 DIAGNOSIS — C61 PROSTATE CANCER METASTATIC TO BONE (HCC): Primary | ICD-10-CM

## 2022-08-19 DIAGNOSIS — C79.51 PROSTATE CANCER METASTATIC TO BONE (HCC): Primary | ICD-10-CM

## 2022-08-19 DIAGNOSIS — D72.819 CHRONIC LEUKOPENIA: ICD-10-CM

## 2022-08-19 DIAGNOSIS — Z79.899 ON ANTINEOPLASTIC CHEMOTHERAPY: ICD-10-CM

## 2022-08-19 DIAGNOSIS — N18.30 ANEMIA, CHRONIC RENAL FAILURE, STAGE 3 (MODERATE) (HCC): ICD-10-CM

## 2022-08-19 DIAGNOSIS — C79.51 BONE METASTASES (HCC): ICD-10-CM

## 2022-08-19 DIAGNOSIS — D63.1 ANEMIA, CHRONIC RENAL FAILURE, STAGE 3 (MODERATE) (HCC): ICD-10-CM

## 2022-08-19 DIAGNOSIS — D50.8 IRON DEFICIENCY ANEMIA SECONDARY TO INADEQUATE DIETARY IRON INTAKE: ICD-10-CM

## 2022-08-19 DIAGNOSIS — C61 PROSTATE CANCER (HCC): ICD-10-CM

## 2022-08-19 PROCEDURE — G8510 SCR DEP NEG, NO PLAN REQD: HCPCS | Performed by: INTERNAL MEDICINE

## 2022-08-19 PROCEDURE — G8428 CUR MEDS NOT DOCUMENT: HCPCS | Performed by: INTERNAL MEDICINE

## 2022-08-19 PROCEDURE — 1123F ACP DISCUSS/DSCN MKR DOCD: CPT | Performed by: INTERNAL MEDICINE

## 2022-08-19 PROCEDURE — G8752 SYS BP LESS 140: HCPCS | Performed by: INTERNAL MEDICINE

## 2022-08-19 PROCEDURE — G8754 DIAS BP LESS 90: HCPCS | Performed by: INTERNAL MEDICINE

## 2022-08-19 PROCEDURE — 1101F PT FALLS ASSESS-DOCD LE1/YR: CPT | Performed by: INTERNAL MEDICINE

## 2022-08-19 PROCEDURE — 99214 OFFICE O/P EST MOD 30 MIN: CPT | Performed by: INTERNAL MEDICINE

## 2022-08-19 PROCEDURE — G8536 NO DOC ELDER MAL SCRN: HCPCS | Performed by: INTERNAL MEDICINE

## 2022-08-19 PROCEDURE — G0463 HOSPITAL OUTPT CLINIC VISIT: HCPCS | Performed by: INTERNAL MEDICINE

## 2022-08-19 PROCEDURE — G8417 CALC BMI ABV UP PARAM F/U: HCPCS | Performed by: INTERNAL MEDICINE

## 2022-08-19 NOTE — PROGRESS NOTES
Hematology/Oncology  Progress Note    NAME Leif Ann  : 1940    PCP: Teena Stanford MD     Mr. Tamiko Gilmore is a [de-identified] y.o. man who is here for reassessment of his anemia associated with Chronic Kidney Disease, Prostate Cancer     Subjective:   Mr. Tamiko Gilmore is a 82-year-old man who has history of Prostate Cancer and Anemia associated with Chronic Kidney Disease. Per chart review, he had an elevation in his PSA to 7 ng/mL then was referred for a bone scan on 2019. This test showed intense uptake involving the medial aspect of the right clavicle. Also there was increased uptake involving the lower thoracic spine at the level of T9 and T11. There was some concern that he may have a small pathologic fracture. With his history of prostate cancer and progressive PSA elevation he was suggested on Degarelix at a dose of 240 mg loading dose followed by 80 mg monthly. His daughter stated he declined systemic therapy. He had received homeopathic therapy for prostate cancer in [place] instead. He subsequently had stroke in 2020. He was very poor historian after stroke with mostly verbal response in yes or no. He has been working with speech therapy after stroke. He stated he did receive radiation to his brain mass previously. On 2/3/2021 C1 Degarelix and Xgeva. Today the patient is in the office for follow up of anemia associated with CKD and Prostate Cancer. He is in the office with his his daughter. Today he denies urinary symptoms. Today he  Denies fever, chills, night sweat, unintentional weight loss, skin lumps or bumps, acute bleeding or bruising issues. Denies headache, acute vision change, dizziness, chest pain, worsen shortness of breath, palpitation, productive cough, nausea, vomiting, abdominal pain, altered bowel habits, dysuria, bone pain or back pain, new focal numbness or weakness.  Using a cane for support and mobility       Past medical history, family history, and social history: these were reviewed and remains unchanged. Past Medical History:   Diagnosis Date    Elevated PSA     Hypercholesterolemia     Hypertension     Stroke (cerebrum) ([address]      No past surgical history on file. Social History     Socioeconomic History    Marital status: SINGLE     Spouse name: Not on file    Number of children: Not on file    Years of education: Not on file    Highest education level: Not on file   Occupational History    Not on file   Tobacco Use    Smoking status: Never    Smokeless tobacco: Never   Substance and Sexual Activity    Alcohol use: No    Drug use: Not on file    Sexual activity: Not on file   Other Topics Concern    Not on file   Social History Narrative    Not on file     Social Determinants of Health     Financial Resource Strain: Not on file   Food Insecurity: Not on file   Transportation Needs: Not on file   Physical Activity: Not on file   Stress: Not on file   Social Connections: Not on file   Intimate Partner Violence: Not on file   Housing Stability: Not on file     Family History   Problem Relation Age of Onset    Diabetes Mother     Hypertension Mother     Heart Disease Mother     Cancer Father     Heart Disease Brother     Diabetes Brother     Heart Disease Brother      Current Outpatient Medications   Medication Sig Dispense Refill    Zytiga 500 mg tab TAKE 2 TABLETS (1,000MG) BY MOUTH ONCE DAILY ON AN  EMPTY STOMACH 1 HOUR BEFORE OR 2 HOURS AFTER A MEAL 60 Tablet 1    dorzolamide-timoloL (COSOPT) 22.3-6.8 mg/mL ophthalmic solution instill 1 drop into both eyes twice a day      losartan (Cozaar) 50 mg tablet Take  by mouth daily. amLODIPine (Norvasc) 10 mg tablet Take  by mouth daily. ferrous sulfate (SLOW FE) 142 mg (45 mg iron) ER tablet Take 1 Tablet by mouth every other day. 45 Tablet 1    predniSONE (DELTASONE) 5 mg tablet Take 1 Tablet by mouth daily.  30 Tablet 3    abiraterone 500 mg tab       tamsulosin (FLOMAX) 0.4 mg capsule take 1 capsule by mouth every evening 90 Cap 0    bumetanide (BUMEX) 2 mg tablet Take 2 mg by mouth daily. 0    CARTIA  mg ER capsule   0    metFORMIN (GLUCOPHAGE) 500 mg tablet Take  by mouth two (2) times daily (with meals). (Patient not taking: Reported on 6/23/2022)      timolol (TIMOPTIC) 0.5 % ophthalmic solution 1 Drop two (2) times a day. ELIQUIS 5 mg tablet   0    atorvastatin (LIPITOR) 40 mg tablet take 1 tablet by mouth at bedtime for cholesterol  0    fenofibrate micronized (LOFIBRA) 67 mg capsule take 1 capsule by mouth once daily (Patient not taking: Reported on 6/23/2022)  0    furosemide (LASIX) 20 mg tablet  (Patient not taking: Reported on 6/23/2022)  0    levETIRAcetam (KEPPRA) 750 mg tablet take 1 tablet by mouth twice a day  0    carvedilol (COREG) 12.5 mg tablet Take  by mouth two (2) times daily (with meals). losartan-hydrochlorothiazide (HYZAAR) 100-25 mg per tablet Take 1 Tab by mouth daily. omeprazole (PRILOSEC) 10 mg capsule Take 10 mg by mouth daily. Review of Systems   Constitutional:  Negative for chills, diaphoresis, fever, malaise/fatigue and weight loss. Respiratory:  Negative for cough, hemoptysis, shortness of breath and wheezing. Cardiovascular:  Negative for chest pain, palpitations and leg swelling. Gastrointestinal:  Negative for abdominal pain, diarrhea, heartburn, nausea and vomiting. Genitourinary:  Negative for dysuria, frequency, hematuria and urgency. Musculoskeletal:  Negative for joint pain and myalgias. Skin:  Negative for itching and rash. Neurological:  Negative for dizziness, seizures, weakness and headaches. Psychiatric/Behavioral:  Negative for depression. The patient does not have insomnia. Objective:     Visit Vitals  /60   Pulse 71   Temp 97.8 °F (36.6 °C)   Ht 5' 9\" (1.753 m)   Wt 83.1 kg (183 lb 3.2 oz)   SpO2 96%   BMI 27.05 kg/m²       Physical Exam  Constitutional:       General: He is not in acute distress.   HENT: Head: Normocephalic and atraumatic. Eyes:      Pupils: Pupils are equal, round, and reactive to light. Cardiovascular:      Pulses: Normal pulses. Heart sounds: Normal heart sounds. No murmur heard. Pulmonary:      Effort: Pulmonary effort is normal. No respiratory distress. Breath sounds: Normal breath sounds. Abdominal:      General: Bowel sounds are normal. There is no distension. Palpations: Abdomen is soft. There is no mass. Tenderness: There is no abdominal tenderness. There is no guarding. Musculoskeletal:         General: No swelling or tenderness. Cervical back: Neck supple. No rigidity. Lymphadenopathy:      Cervical: No cervical adenopathy. Skin:     General: Skin is warm. Findings: No rash. Neurological:      Mental Status: He is alert and oriented to person, place, and time. Mental status is at baseline. Cranial Nerves: No cranial nerve deficit. Psychiatric:         Mood and Affect: Mood normal.        Diagnostics:      No results found for this or any previous visit (from the past 96 hour(s)). Imaging:  No results found for this or any previous visit. No results found for this or any previous visit. Results for orders placed in visit on 08/26/21    CT CHEST ABD PELV W CONT    Narrative  EXAM: CT CHEST, ABDOMEN AND PELVIS WITH CONTRAST    CLINICAL INDICATION/HISTORY: Prostate cancer with bone metastases. COMPARISON: Outside hospital CT chest/abdomen/pelvis 9/28/2020    TECHNIQUE:  CT chest, abdomen and pelvis with IV contrast.  All CT scans at this facility are performed using dose optimization technique as  appropriate to the performed examination, to include automated exposure control,  adjustment of the mA and/or kV according to patient's size (including  appropriate matching for site-specific examinations), or use of an iterative  reconstruction technique.       FINDINGS:    Lung/Airways:  6 mm nodule along the right major fissure (4, 30), may be an  intrapulmonary lymph node, stable. Another 4 mm nodule more inferiorly along the  right major fissure (33) may also be an intrapulmonary lymph node, also stable. 4 mm right upper lobe nodule (4, 18), stable. 3 mm right middle lobe nodule  (41), stable. 2 mm right lower lobe nodule (41), not definitely seen on prior. No consolidation or pleural effusion. Base of Neck:  Unremarkable. Mediastinum: 1 cm short axis AP window lymph node. Main pulmonary artery  measures 3.7 cm, suggesting pulmonary arterial hypertension. Heart: Small pericardial effusion, increased from prior. . Extensive coronary  artery calcifications. Mild aortic valve calcification. Liver: Diffuse low-attenuation of the liver likely reflects steatosis. Few  subcentimeter hypodense lesions (for example, measuring 5 mm in the inferior  right hepatic lobe on 3, 23) are too small to characterize, but most likely  cysts or hemangiomas, and stable from prior. 1.6 x 0.9 cm focus of arterial  enhancement in segment 7 (11), stable from prior. Biliary: Unremarkable. Pancreas: Unremarkable. Spleen: Unremarkable. Adrenal glands: Unremarkable. Kidneys: Punctate nonobstructing bilateral renal stones. Mild areas of scarring  bilateral kidneys, right greater than left. 2.5 cm left renal cyst. No  hydronephrosis. Reproductive organs: Evaluation is significantly limited due to streak artifact  from bilateral hyperechoic opacities. Bladder: Limited evaluation, but grossly unremarkable. Bowel: Tiny hiatal hernia. No evidence for bowel obstruction or inflammation. Lymph nodes: No pathologically enlarged lymph nodes. Vasculature: Extensive burden of calcified atherosclerotic disease, including at  the mesenteric and renal artery ostia. Other: No free fluid or free air. Body wall: Mild bilateral gynecomastia. Tiny fat-containing umbilical hernia.   Areas of stranding in the subcutaneous fat of the lower abdominal wall may  reflect contusions or sequela of medication injection. Bilateral hydroceles,  with fluid extending into the bilateral inguinal regions, new on the left, but  stable on the right since 9/28/2020. Bones: Bilateral hip arthroplasties. Moderate T9 compression deformity and mild  T11 compression deformity, both unchanged. Multilevel degenerative disc disease. Subtle sclerosis of the right clavicular head is unchanged from 9/28/2020. Please see nuclear medicine bone scan performed on the same day and dictated  separately. Impression  1. New 2 mm right lower lobe pulmonary nodule, nonspecific. Attention on  follow-up. Other small pulmonary nodules are unchanged from 9/28/2020.    2. Arterially enhancing lesion in the posterior right hepatic lobe measuring 1.6  cm, most likely a flash filling hemangioma, stable from 9/28/2020.  -Few subcentimeter hypodense lesions in the liver are too small to characterize,  but likely cysts, also unchanged. 3. Nonobstructing renal stones bilaterally. 4. Please see nuclear medicine bone scan performed the same day and dictated  separately for complete evaluation. Unchanged subtle sclerosis in the right  clavicular head. 5. Small pericardial effusion is increased from prior. 6. Enlarged main pulmonary artery, unchanged from prior, suggests pulmonary  chill hypertension. 7. Other findings as above. Assessment:     1. Prostate cancer metastatic to bone ([address]    2. Prostate cancer ([address]    3. Bone metastases ([address]    4. On antineoplastic chemotherapy    5. Anemia, chronic renal failure, stage 3 (moderate) (HCC)    6. Chronic leukopenia    7. 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      Plan:   # Prostate cancer  # Bone metastases:   -- 12/11/2018 showed that his prostate specific antigen was 7.27 ng/mL. -- Patient was being followed by Dr. Ten Williamson who just retired.  A follow-up PSA and and a bone scan was schedule and the result showed intense uptake involving the medial aspect of the right clavicle. Also there was increased uptake involving the lower thoracic spine at the level of T9 and T11. --The MRI of the thoracic spine shows correlating with abnormal uptake on prior whole body bone scan of 4/12/2019, there are acute/subacute fractures are seen within T9 and T11 vertebral bodies, both of which are almost certainly pathologic given foci of suspicious enhancement. Additional osseous metastatic lesion within left paracentral T2 vertebral body. Multilevel degenerative disc disease, to include ventral thoracic cord impingement at T2-T3 and T6-T7 vertebral body levels. + The MRI of the lumber spine also shows no fracture nor osseous metastatic disease within the lumbar spine or visualized osseous pelvis. Only partially visualized on this exam is the T11 vertebral body pathologic fracture, better evaluated on concurrent thoracic spine MRI. Severe right foraminal stenosis at L3-L4 with impingement of right L3 foraminal nerve root by prominent focal right foraminal disc bulge. Moderate bilateral foraminal stenoses at L5-S1, each with abutment of respective L5 foraminal nerve roots by adjacent foraminal disc. Moderate L3-L4 and mild L4-L5 canal stenoses. -- 4/2019 He was suggested on Degarelix at a dose of 240 mg loading dose followed by 80 mg monthly. He declined systemic chemotherapy. Subsequently he had received homeopathic therapy for prostate cancer in [place] instead. -- He had stroke in March 2020. He was poor historian after stroke with mostly verbal response only in yes or no. He has been working with speech therapy after stroke. -- 7/14/2020 PSA was elevated from 3.7 to 7.5  -- 9/28/2020 CT C/A/P reported no findings to suggest interval progression of metastatic disease.  -- 9/15/2020 Bone Scan reported persistent moderate to intense activity involving the medial aspect of both clavicular heads. Less intense uptake involving the right anterior fourth rib as well as T9 and T11 vertebra. No new foci of uptake is identified to strongly suggest progression of osseous metastatic disease. -- 11/5/2020 PSA was 7.4  -- He has seen Dr. Lawrence Lerma who did not see any current role for radiation as his urinary function is excellent and he has metastatic disease that is asymptomatic. He was agreeable with re-initiation of ADT given his excellent response at pervious time. He continues to decline chemotherapy. -- 2/3/2021 C[facility] Degarelix and [facility]geva at [facility]. -- 4/8/2021 PSA 4.8.  -- 6/22/2021 PSA 4.2  -- 8/17/2021 PSA 6.6   -- 9/2/2021 CT New 2 mm right lower lobe pulmonary nodule, nonspecific. Attention on follow-up. Other small pulmonary nodules are unchanged from 9/28/2020.  + Arterially enhancing lesion in the posterior right hepatic lobe measuring 1.6  cm, most likely a flash filling hemangioma, stable from 9/28/2020.  + Few subcentimeter hypodense lesions in the liver are too small to characterize,  but likely cysts, also unchanged. --9/2/2021 Bone scan reported no definite scintigraphic evidence of osseous metastatic disease. Subtle focal uptake seen at the posterior T11 vertebral body may correspond to a compression deformity at this location seen on reference imaging. Suggests chronic fracture. -- Patient started Abiraterone and Prednisone combo on 10/13/21. He has tolerated therapy without high graded toxicities. Clinical stable.   --01/28/2021 PSA 5.930  --09/22/2021 PSA 3.620  --12/15/2021 PSA 0.400  --03/26/2022 PSA 0.050  --05/17/2022 PSA 0.0  --05/05/2022: CT C/A/P reported no metastatic disease identified in the abdomen or pelvis.  + Interval increase in number of small pulmonary nodules bilaterally which measure less than 6 mm in size.  Some of these are centrilobular in location and others demonstrate mild surrounding groundglass density suggesting these could be related to infectious or inflammatory bronchiolitis rather than metastatic disease. Recommend attention on follow-up imaging.   --05/05/2022: Bone Scan showed no evidence of osseous metastatic disease. -- He has tolerated therapy without high graded toxicities. -- Today I have reviewed with the patient about recent lab reports. 7/26/2022 PSA was 0.022 ng/ml     Plan:  -- He will continue Xgeva injection at [facility] as scheduled. -- He will continue Degarelix maintenance dose 80mg every 4 weeks; Abiraterone 1,000mg once daily in combination with Prednisone 5mg once daily. -- Lab check: CBC, CMP, PSA/Testosterone. -- We will see the patient back in clinic in about 8 weeks. Always sooner if required. Degarelix  Day 1: Degarelix 240mg in one 4-week cycle, followed by:  Day 1: Degarelix 80mg (administered as 1 subcutaneous 4mL injection). Repeat cycle every 4 weeks. Nonobstructing Renal Stones Bilaterally. -- Asymptomatic.   -- The patient will f/u his Urology for further management. Anemia associated with Chronic Renal Failure, Stage III:  Iron Deficiency Anemia   --The patient has been scheduled to receive Procrit therapy whenever his hemoglobin is below 10g/dL. 07/26/2022 : CBC showed WBC 2.8, hemoglobin 11.1g/dL and hematocrit 353.5%. Platelet 292. Ferritin 632. Iron Sat 20%. BUN 17 and Creatinine 1.20  --Advised patient to continue the oral iron supplementation every other day prior to breakfast.  --Patient and his daughter verbalized understanding and agreed with the plan.         Orders Placed This Encounter    METABOLIC PANEL, COMPREHENSIVE     Standing Status:   Future     Standing Expiration Date:   2/19/2023    FERRITIN     Standing Status:   Future     Standing Expiration Date:   8/19/2023    IRON PROFILE     Standing Status:   Future     Standing Expiration Date:   8/19/2023    CBC WITH AUTOMATED DIFF     Standing Status:   Future     Standing Expiration Date:   12/19/2022    PROSTATE SPECIFIC AG     Standing Status:   Future     Standing Expiration Date:   2/19/2024         Mr. Pike Person has a reminder for a \"due or due soon\" health maintenance. I have asked that he contact his primary care provider for follow-up on this health maintenance. All of patient's questions answered to their apparent satisfaction. They verbally show understanding and agreement with aforementioned plan. Heath Dee MD  8/19/2022          Parts of this document has been produced using Dragon dictation system. Unrecognized errors in transcription may be present. Please do not hesitate to reach out for any questions or clarifications.       CC: Danna Fitzpatrick MD

## 2022-09-02 NOTE — PROGRESS NOTES
Hematology/Oncology  Progress Note    Name: Shelly Cobb  : 1940    PCP: Larisa Mejia MD     Mr. Luc Marti is a 80 y.o. man who is here for reassessment of his anemia associated with chronic kidney disease, Prostate Cancer       Subjective:       Mr. Luc Marti is a 74-year-old man who has history of prostate cancer and anemia associated with chronic kidney disease. Per chart review, he had an elevation in his PSA to 7 ng/mL then was referred for a bone scan on 2019. This test showed intense uptake involving the medial aspect of the right clavicle. Also there was increased uptake involving the lower thoracic spine at the level of T9 and T11. There was some concern that he may have a small pathologic fracture. With his history of prostate cancer and progressive PSA elevation he was suggested on Degarelix at a dose of 240 mg loading dose followed by 80 mg monthly. His daughter stated he declined systemic therapy. He had received homeopathic therapy for prostate cancer in [place] instead. He subsequently had stroke in 2020. He was very poor historian after stroke with mostly verbal response in yes or no. He has been working with speech therapy after stroke. He stated he did receive radiation to his brain mass previously. On 2/3/2021 C1 Degarelix and Xgeva. Today the patient was here for follow up. The patient was accompanied by his family. He denied any new symptoms. He has tolerated therapy well. The patient otherwise has no other complaints. Denied fever, chills, night sweat, unintentional weight loss, skin lumps or bumps, acute bleeding or bruising issues. Denied headache, acute vision change, dizziness, chest pain, worsen shortness of breath, palpitation, productive cough, nausea, vomiting, abdominal pain, altered bowel habits, dysuria, worsening bone pain or back pain, new focal numbness or weakness.          Past medical history, family history, and social history: these were reviewed and remains unchanged. Past Medical History:   Diagnosis Date    Elevated PSA     Hypercholesterolemia     Hypertension     Stroke (cerebrum) ([facility] [address]      History reviewed. No pertinent surgical history. Social History     Socioeconomic History    Marital status: SINGLE     Spouse name: Not on file    Number of children: Not on file    Years of education: Not on file    Highest education level: Not on file   Occupational History    Not on file   Tobacco Use    Smoking status: Never Smoker    Smokeless tobacco: Never Used   Substance and Sexual Activity    Alcohol use: No    Drug use: Not on file    Sexual activity: Not on file   Other Topics Concern    Not on file   Social History Narrative    Not on file     Social Determinants of Health     Financial Resource Strain:     Difficulty of Paying Living Expenses:    Food Insecurity:     Worried About Running Out of Food in the Last Year:     [address] in the Last Year:    Transportation Needs:     Lack of Transportation (Medical):      Lack of Transportation (Non-Medical):    Physical Activity:     Days of Exercise per Week:     Minutes of Exercise per Session:    Stress:     Feeling of Stress :    Social Connections:     Frequency of Communication with Friends and Family:     Frequency of Social Gatherings with Friends and Family:     Attends Rastafarian Services:     Active Member of Clubs or Organizations:     Attends Club or Organization Meetings:     Marital Status:    Intimate Partner Violence:     Fear of Current or Ex-Partner:     Emotionally Abused:     Physically Abused:     Sexually Abused:      Family History   Problem Relation Age of Onset    Diabetes Mother     Hypertension Mother     Heart Disease Mother     Cancer Father     Heart Disease Brother     Diabetes Brother     Heart Disease Brother      Current Outpatient Medications   Medication Sig Dispense Refill    tamsulosin (FLOMAX) 0.4 mg capsule take 1 capsule by mouth every evening 90 Cap 0    bumetanide (BUMEX) 2 mg tablet Take 2 mg by mouth daily. 0    CARTIA  mg ER capsule   0    metFORMIN (GLUCOPHAGE) 500 mg tablet Take  by mouth two (2) times daily (with meals).  timolol (TIMOPTIC) 0.5 % ophthalmic solution 1 Drop two (2) times a day.  ELIQUIS 5 mg tablet   0    atorvastatin (LIPITOR) 40 mg tablet take 1 tablet by mouth at bedtime for cholesterol  0    fenofibrate micronized (LOFIBRA) 67 mg capsule take 1 capsule by mouth once daily  0    furosemide (LASIX) 20 mg tablet   0    levETIRAcetam (KEPPRA) 750 mg tablet take 1 tablet by mouth twice a day  0    carvedilol (COREG) 12.5 mg tablet Take  by mouth two (2) times daily (with meals).  losartan-hydrochlorothiazide (HYZAAR) 100-25 mg per tablet Take 1 Tab by mouth daily.  omeprazole (PRILOSEC) 10 mg capsule Take 10 mg by mouth daily. Review of Systems   Constitutional: Negative for chills, diaphoresis, fever, malaise/fatigue and weight loss. Respiratory: Negative for cough, hemoptysis, shortness of breath and wheezing. Cardiovascular: Negative for chest pain, palpitations and leg swelling. Gastrointestinal: Negative for abdominal pain, diarrhea, heartburn, nausea and vomiting. Genitourinary: Negative for dysuria, frequency, hematuria and urgency. Musculoskeletal: Negative for joint pain and myalgias. Skin: Negative for itching and rash. Neurological: Negative for dizziness, seizures, weakness and headaches. Psychiatric/Behavioral: Negative for depression. The patient does not have insomnia. Objective:     Visit Vitals  /70 (BP 1 Location: Left arm, BP Patient Position: Sitting)   Pulse 62   Temp 97.8 °F (36.6 °C)   Resp 18   Ht 5' 8\" (1.727 m)   Wt 78.8 kg (173 lb 12.8 oz)   SpO2 100%   BMI 26.43 kg/m²         Physical Exam  Constitutional:       General: He is not in acute distress.   HENT:      Head: Normocephalic and atraumatic. Eyes:      Pupils: Pupils are equal, round, and reactive to light. Cardiovascular:      Pulses: Normal pulses. Heart sounds: Normal heart sounds. No murmur heard. Pulmonary:      Effort: Pulmonary effort is normal. No respiratory distress. Breath sounds: Normal breath sounds. Abdominal:      General: Bowel sounds are normal. There is no distension. Palpations: Abdomen is soft. There is no mass. Tenderness: There is no abdominal tenderness. There is no guarding. Musculoskeletal:         General: No swelling or tenderness. Cervical back: Neck supple. No rigidity. Lymphadenopathy:      Cervical: No cervical adenopathy. Skin:     General: Skin is warm. Findings: No rash. Neurological:      Mental Status: He is alert and oriented to person, place, and time. Mental status is at baseline. Cranial Nerves: No cranial nerve deficit. Psychiatric:         Mood and Affect: Mood normal.          Diagnostics:      No results found for this or any previous visit (from the past 96 hour(s)). Imaging:  No results found for this or any previous visit. No results found for this or any previous visit. Results for orders placed in visit on 08/26/21    CT CHEST ABD PELV W CONT    Narrative  EXAM: CT CHEST, ABDOMEN AND PELVIS WITH CONTRAST    CLINICAL INDICATION/HISTORY: Prostate cancer with bone metastases. COMPARISON: Outside hospital CT chest/abdomen/pelvis 9/28/2020    TECHNIQUE:  CT chest, abdomen and pelvis with IV contrast.  All CT scans at this facility are performed using dose optimization technique as  appropriate to the performed examination, to include automated exposure control,  adjustment of the mA and/or kV according to patient's size (including  appropriate matching for site-specific examinations), or use of an iterative  reconstruction technique.       FINDINGS:    Lung/Airways:  6 mm nodule along the right major fissure (4, 30), may be an  intrapulmonary lymph node, stable. Another 4 mm nodule more inferiorly along the  right major fissure (33) may also be an intrapulmonary lymph node, also stable. 4 mm right upper lobe nodule (4, 18), stable. 3 mm right middle lobe nodule  (41), stable. 2 mm right lower lobe nodule (41), not definitely seen on prior. No consolidation or pleural effusion. Base of Neck:  Unremarkable. Mediastinum: 1 cm short axis AP window lymph node. Main pulmonary artery  measures 3.7 cm, suggesting pulmonary arterial hypertension. Heart: Small pericardial effusion, increased from prior. . Extensive coronary  artery calcifications. Mild aortic valve calcification. Liver: Diffuse low-attenuation of the liver likely reflects steatosis. Few  subcentimeter hypodense lesions (for example, measuring 5 mm in the inferior  right hepatic lobe on 3, 23) are too small to characterize, but most likely  cysts or hemangiomas, and stable from prior. 1.6 x 0.9 cm focus of arterial  enhancement in segment 7 (11), stable from prior. Biliary: Unremarkable. Pancreas: Unremarkable. Spleen: Unremarkable. Adrenal glands: Unremarkable. Kidneys: Punctate nonobstructing bilateral renal stones. Mild areas of scarring  bilateral kidneys, right greater than left. 2.5 cm left renal cyst. No  hydronephrosis. Reproductive organs: Evaluation is significantly limited due to streak artifact  from bilateral hyperechoic opacities. Bladder: Limited evaluation, but grossly unremarkable. Bowel: Tiny hiatal hernia. No evidence for bowel obstruction or inflammation. Lymph nodes: No pathologically enlarged lymph nodes. Vasculature: Extensive burden of calcified atherosclerotic disease, including at  the mesenteric and renal artery ostia. Other: No free fluid or free air. Body wall: Mild bilateral gynecomastia. Tiny fat-containing umbilical hernia.   Areas of stranding in the subcutaneous fat of the lower abdominal wall may  reflect contusions or sequela of medication injection. Bilateral hydroceles,  with fluid extending into the bilateral inguinal regions, new on the left, but  stable on the right since 9/28/2020. Bones: Bilateral hip arthroplasties. Moderate T9 compression deformity and mild  T11 compression deformity, both unchanged. Multilevel degenerative disc disease. Subtle sclerosis of the right clavicular head is unchanged from 9/28/2020. Please see nuclear medicine bone scan performed on the same day and dictated  separately. Impression  1. New 2 mm right lower lobe pulmonary nodule, nonspecific. Attention on  follow-up. Other small pulmonary nodules are unchanged from 9/28/2020.    2. Arterially enhancing lesion in the posterior right hepatic lobe measuring 1.6  cm, most likely a flash filling hemangioma, stable from 9/28/2020.  -Few subcentimeter hypodense lesions in the liver are too small to characterize,  but likely cysts, also unchanged. 3. Nonobstructing renal stones bilaterally. 4. Please see nuclear medicine bone scan performed the same day and dictated  separately for complete evaluation. Unchanged subtle sclerosis in the right  clavicular head. 5. Small pericardial effusion is increased from prior. 6. Enlarged main pulmonary artery, unchanged from prior, suggests pulmonary  chill hypertension. 7. Other findings as above. Assessment:     1. Prostate cancer metastatic to bone ([address]    2. Prostate cancer ([address]    3. Bone metastases ([address]    4. Anemia, chronic renal failure, stage 3 (moderate) (HCC)    5. 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    6. Chronic leukopenia      Plan:   # Prostate cancer  # Bone metastases:   -- 12/11/2018 showed that his prostate specific antigen was 7.27 ng/mL. -- Patient was being followed by Dr. Alexander Rodas who just retired.  A follow-up PSA and and a bone scan was schedule and the result showed intense uptake involving the medial aspect of the right clavicle. Also there was increased uptake involving the lower thoracic spine at the level of T9 and T11. --The MRI of the thoracic spine shows correlating with abnormal uptake on prior whole body bone scan of 4/12/2019, there are acute/subacute fractures are seen within T9 and T11 vertebral bodies, both of which are almost certainly pathologic given foci of suspicious enhancement. Additional osseous metastatic lesion within left paracentral T2 vertebral body. Multilevel degenerative disc disease, to include ventral thoracic cord impingement at T2-T3 and T6-T7 vertebral body levels. + The MRI of the lumber spine also shows no fracture nor osseous metastatic disease within the lumbar spine or visualized osseous pelvis. Only partially visualized on this exam is the T11 vertebral body pathologic fracture, better evaluated on concurrent thoracic spine MRI. Severe right foraminal stenosis at L3-L4 with impingement of right L3 foraminal nerve root by prominent focal right foraminal disc bulge. Moderate bilateral foraminal stenoses at L5-S1, each with abutment of respective L5 foraminal nerve roots by adjacent foraminal disc. Moderate L3-L4 and mild L4-L5 canal stenoses. -- 4/2019 He was suggested on Degarelix at a dose of 240 mg loading dose followed by 80 mg monthly. He declined systemic chemotherapy. Subsequently he had received homeopathic therapy for prostate cancer in [place] instead. -- He had stroke in March 2020. He was poor historian after stroke with mostly verbal response only in yes or no. He has been working with speech therapy after stroke. -- 7/14/2020 PSA was elevated from 3.7 to 7.5  -- 9/28/2020 CT C/A/P reported no findings to suggest interval progression of metastatic disease.  -- 9/15/2020 Bone Scan reported persistent moderate to intense activity involving the medial aspect of both clavicular heads.  Less intense uptake involving the right anterior fourth rib as well as T9 and T11 vertebra. No new foci of uptake is identified to strongly suggest progression of osseous metastatic disease. -- 11/5/2020 PSA was 7.4  -- He has seen Dr. Brian Segal who did not see any current role for radiation as his urinary function is excellent and he has metastatic disease that is asymptomatic. He was agreeable with re-initiation of ADT given his excellent response at pervious time. He continues to decline chemotherapy. -- 2/3/2021 C1 Degarelix and Xgeva at [facility]. -- 4/8/2021 PSA 4.8.  -- 6/22/2021 PSA 4.2  -- 8/17/2021 PSA 6.6   -- 9/2/2021 CT New 2 mm right lower lobe pulmonary nodule, nonspecific. Attention on follow-up. Other small pulmonary nodules are unchanged from 9/28/2020.  + Arterially enhancing lesion in the posterior right hepatic lobe measuring 1.6  cm, most likely a flash filling hemangioma, stable from 9/28/2020.  + Few subcentimeter hypodense lesions in the liver are too small to characterize,  but likely cysts, also unchanged. -- 9/2/2021 Bone scan reported no definite scintigraphic evidence of osseous metastatic disease. Subtle focal uptake seen at the posterior T11 vertebral body may correspond to a compression deformity at this location seen on reference imaging. Suggests chronic fracture. -- He has tolerated therapy without high graded toxicities. Clinical stable. We have discussed about the addition of anti-androgens or combined androgen blockade as next options of therapy to increase therapy effect. The patient was agreeable with the plan. Plan:  -- He will continue Xgeva injection at [facility] as scheduled. -- Degarelix maintenance dose 80 mg every 4 weeks; Abiraterone 1,000 mg once daily in combination with Prednisone 5 mg once daily. -- Lab check: CBC, CMP, PSA/Testosterone. -- We will see the patient back in clinic in about 4 weeks. Always sooner if required.       Degarelix  Day 1: Degarelix 240mg in one 4-week cycle, followed by:  Day 1: Degarelix 80mg (administered as 1 subcutaneous 4mL injection). Repeat cycle every 4 weeks.         # Nonobstructing renal stones bilaterally. -- Asymptomatic. The patient will f/u his PCP/Urology for further management. # Anemia associated with chronic renal failure, stage III:  # Hx Iron deficiency anemia   -- The patient has been scheduled to receive Procrit therapy whenever his hemoglobin is below 10 g/dL and hematocrit is below 30%. -- Recent CBC and CMP reviewed. We will repeat CBC, Iron profiles. /ferritin periodically. Orders Placed This Encounter    abiraterone 500 mg tab     Sig: Take 1,000 mg by mouth daily for 30 days. Dispense:  60 Tablet     Refill:  3    predniSONE (DELTASONE) 5 mg tablet     Sig: Take 1 Tablet by mouth daily for 30 days. Dispense:  30 Tablet     Refill:  3           Mr. Bobbi Reyna has a reminder for a \"due or due soon\" health maintenance. I have asked that he contact his primary care provider for follow-up on this health maintenance. All of patient's questions answered to their apparent satisfaction. They verbally show understanding and agreement with aforementioned plan. Neymar Marina MD  9/27/2021            Parts of this document has been produced using Dragon dictation system. Unrecognized errors in transcription may be present. Please do not hesitate to reach out for any questions or clarifications.       CC: Celia Dewey MD No

## 2022-09-07 LAB
CREATININE, EXTERNAL: 1
PSA, EXTERNAL: 0.02

## 2022-09-16 DIAGNOSIS — C61 PROSTATE CANCER (HCC): ICD-10-CM

## 2022-09-16 DIAGNOSIS — C61 PROSTATE CANCER METASTATIC TO BONE (HCC): ICD-10-CM

## 2022-09-16 DIAGNOSIS — C79.51 PROSTATE CANCER METASTATIC TO BONE (HCC): ICD-10-CM

## 2022-09-16 DIAGNOSIS — C79.51 BONE METASTASES (HCC): ICD-10-CM

## 2022-09-16 RX ORDER — ABIRATERONE ACETATE 500 MG/1
TABLET, FILM COATED ORAL
Qty: 60 TABLET | Refills: 1 | Status: SHIPPED | OUTPATIENT
Start: 2022-09-16

## 2022-10-10 DIAGNOSIS — C61 PROSTATE CANCER METASTATIC TO BONE (HCC): ICD-10-CM

## 2022-10-10 DIAGNOSIS — C61 PROSTATE CANCER (HCC): ICD-10-CM

## 2022-10-10 DIAGNOSIS — C79.51 BONE METASTASES (HCC): ICD-10-CM

## 2022-10-10 DIAGNOSIS — C79.51 PROSTATE CANCER METASTATIC TO BONE (HCC): ICD-10-CM

## 2022-10-11 ENCOUNTER — LAB ONLY (OUTPATIENT)
Dept: ONCOLOGY | Age: 82
End: 2022-10-11

## 2022-10-11 DIAGNOSIS — D63.1 ANEMIA, CHRONIC RENAL FAILURE, STAGE 3 (MODERATE) (HCC): ICD-10-CM

## 2022-10-11 DIAGNOSIS — C61 PROSTATE CANCER METASTATIC TO BONE (HCC): Primary | ICD-10-CM

## 2022-10-11 DIAGNOSIS — N18.30 ANEMIA, CHRONIC RENAL FAILURE, STAGE 3 (MODERATE) (HCC): ICD-10-CM

## 2022-10-11 DIAGNOSIS — C79.51 BONE METASTASES (HCC): ICD-10-CM

## 2022-10-11 DIAGNOSIS — C79.51 PROSTATE CANCER METASTATIC TO BONE (HCC): Primary | ICD-10-CM

## 2022-10-11 DIAGNOSIS — C61 PROSTATE CANCER (HCC): ICD-10-CM

## 2022-10-15 LAB
A-G RATIO,AGRAT: 1.3 RATIO (ref 1.1–2.6)
ABSOLUTE LYMPHOCYTE COUNT, 10803: 1.3 K/UL (ref 1–4.8)
ALBUMIN SERPL-MCNC: 4 G/DL (ref 3.5–5)
ALP SERPL-CCNC: 73 U/L (ref 40–125)
ALT SERPL-CCNC: 23 U/L (ref 5–40)
ANION GAP SERPL CALC-SCNC: 10 MMOL/L (ref 3–15)
AST SERPL W P-5'-P-CCNC: 28 U/L (ref 10–37)
BASOPHILS # BLD: 0 K/UL (ref 0–0.2)
BASOPHILS NFR BLD: 1 % (ref 0–2)
BILIRUB SERPL-MCNC: 0.6 MG/DL (ref 0.2–1.2)
BUN SERPL-MCNC: 28 MG/DL (ref 6–22)
CALCIUM SERPL-MCNC: 9.6 MG/DL (ref 8.4–10.5)
CHLORIDE SERPL-SCNC: 102 MMOL/L (ref 98–110)
CO2 SERPL-SCNC: 29 MMOL/L (ref 20–32)
CREAT SERPL-MCNC: 1.4 MG/DL (ref 0.8–1.6)
EOSINOPHIL # BLD: 0.1 K/UL (ref 0–0.5)
EOSINOPHIL NFR BLD: 2 % (ref 0–6)
ERYTHROCYTE [DISTWIDTH] IN BLOOD BY AUTOMATED COUNT: 13.9 % (ref 10–15.5)
FE % SATURATION,PSAT: 29 % (ref 20–50)
FERRITIN SERPL-MCNC: 466 NG/ML (ref 22–322)
GLOBULIN,GLOB: 3.1 G/DL (ref 2–4)
GLOMERULAR FILTRATION RATE: 49.5 ML/MIN/1.73 SQ.M.
GLUCOSE SERPL-MCNC: 133 MG/DL (ref 70–99)
GRANULOCYTES,GRANS: 55 % (ref 40–75)
HCT VFR BLD AUTO: 36.7 % (ref 37.8–52.2)
HGB BLD-MCNC: 12.2 G/DL (ref 12.6–17.1)
IRON,IRN: 67 MCG/DL (ref 45–160)
LYMPHOCYTES, LYMLT: 33 % (ref 20–45)
MCH RBC QN AUTO: 31 PG (ref 26–34)
MCHC RBC AUTO-ENTMCNC: 33 G/DL (ref 31–36)
MCV RBC AUTO: 93 FL (ref 80–95)
MONOCYTES # BLD: 0.4 K/UL (ref 0.1–1)
MONOCYTES NFR BLD: 10 % (ref 3–12)
NEUTROPHILS # BLD AUTO: 2.2 K/UL (ref 1.8–7.7)
PLATELET # BLD AUTO: 168 K/UL (ref 140–440)
PMV BLD AUTO: 11.4 FL (ref 9–13)
POTASSIUM SERPL-SCNC: 3.9 MMOL/L (ref 3.5–5.5)
PROT SERPL-MCNC: 7.1 G/DL (ref 6.2–8.1)
RBC # BLD AUTO: 3.95 M/UL (ref 3.8–5.8)
SODIUM SERPL-SCNC: 141 MMOL/L (ref 133–145)
TIBC,TIBC: 234 MCG/DL (ref 228–428)
UIBC SERPL-MCNC: 167 MCG/DL (ref 110–370)
WBC # BLD AUTO: 4 K/UL (ref 4–11)

## 2022-10-19 ENCOUNTER — OFFICE VISIT (OUTPATIENT)
Dept: ONCOLOGY | Age: 82
End: 2022-10-19
Payer: MEDICARE

## 2022-10-19 ENCOUNTER — HOSPITAL ENCOUNTER (OUTPATIENT)
Dept: LAB | Age: 82
Discharge: HOME | End: 2022-10-19
Payer: MEDICARE

## 2022-10-19 VITALS
RESPIRATION RATE: 16 BRPM | HEIGHT: 69 IN | OXYGEN SATURATION: 99 % | HEART RATE: 69 BPM | SYSTOLIC BLOOD PRESSURE: 113 MMHG | DIASTOLIC BLOOD PRESSURE: 54 MMHG | WEIGHT: 175 LBS | BODY MASS INDEX: 25.92 KG/M2

## 2022-10-19 DIAGNOSIS — C79.51 PROSTATE CANCER METASTATIC TO BONE (HCC): Primary | ICD-10-CM

## 2022-10-19 DIAGNOSIS — N20.0 BILATERAL RENAL STONES: ICD-10-CM

## 2022-10-19 DIAGNOSIS — D50.8 IRON DEFICIENCY ANEMIA SECONDARY TO INADEQUATE DIETARY IRON INTAKE: ICD-10-CM

## 2022-10-19 DIAGNOSIS — C61 PROSTATE CANCER METASTATIC TO BONE (HCC): ICD-10-CM

## 2022-10-19 DIAGNOSIS — C61 PROSTATE CANCER METASTATIC TO BONE (HCC): Primary | ICD-10-CM

## 2022-10-19 DIAGNOSIS — C79.51 PROSTATE CANCER METASTATIC TO BONE (HCC): ICD-10-CM

## 2022-10-19 DIAGNOSIS — D63.1 ANEMIA, CHRONIC RENAL FAILURE, STAGE 3 (MODERATE) (HCC): ICD-10-CM

## 2022-10-19 DIAGNOSIS — N18.30 ANEMIA, CHRONIC RENAL FAILURE, STAGE 3 (MODERATE) (HCC): ICD-10-CM

## 2022-10-19 LAB — PSA SERPL-MCNC: 0 NG/ML (ref 0–4)

## 2022-10-19 PROCEDURE — 84403 ASSAY OF TOTAL TESTOSTERONE: CPT

## 2022-10-19 PROCEDURE — 1123F ACP DISCUSS/DSCN MKR DOCD: CPT | Performed by: INTERNAL MEDICINE

## 2022-10-19 PROCEDURE — G8432 DEP SCR NOT DOC, RNG: HCPCS | Performed by: INTERNAL MEDICINE

## 2022-10-19 PROCEDURE — 3078F DIAST BP <80 MM HG: CPT | Performed by: INTERNAL MEDICINE

## 2022-10-19 PROCEDURE — 99214 OFFICE O/P EST MOD 30 MIN: CPT | Performed by: INTERNAL MEDICINE

## 2022-10-19 PROCEDURE — 36415 COLL VENOUS BLD VENIPUNCTURE: CPT

## 2022-10-19 PROCEDURE — 3074F SYST BP LT 130 MM HG: CPT | Performed by: INTERNAL MEDICINE

## 2022-10-19 PROCEDURE — G8754 DIAS BP LESS 90: HCPCS | Performed by: INTERNAL MEDICINE

## 2022-10-19 PROCEDURE — G8752 SYS BP LESS 140: HCPCS | Performed by: INTERNAL MEDICINE

## 2022-10-19 PROCEDURE — 1101F PT FALLS ASSESS-DOCD LE1/YR: CPT | Performed by: INTERNAL MEDICINE

## 2022-10-19 PROCEDURE — G0463 HOSPITAL OUTPT CLINIC VISIT: HCPCS | Performed by: INTERNAL MEDICINE

## 2022-10-19 PROCEDURE — G8417 CALC BMI ABV UP PARAM F/U: HCPCS | Performed by: INTERNAL MEDICINE

## 2022-10-19 PROCEDURE — G8427 DOCREV CUR MEDS BY ELIG CLIN: HCPCS | Performed by: INTERNAL MEDICINE

## 2022-10-19 PROCEDURE — 84153 ASSAY OF PSA TOTAL: CPT

## 2022-10-19 PROCEDURE — G8536 NO DOC ELDER MAL SCRN: HCPCS | Performed by: INTERNAL MEDICINE

## 2022-10-19 RX ORDER — BUMETANIDE 1 MG/1
1 TABLET ORAL 2 TIMES DAILY
COMMUNITY
Start: 2022-10-11

## 2022-10-19 RX ORDER — PREDNISONE 5 MG/1
5 TABLET ORAL DAILY
Qty: 90 TABLET | Refills: 3 | Status: SHIPPED | OUTPATIENT
Start: 2022-10-19

## 2022-10-19 RX ORDER — LOSARTAN POTASSIUM 25 MG/1
25 TABLET ORAL DAILY
COMMUNITY
Start: 2022-10-11

## 2022-10-19 RX ORDER — SPIRONOLACTONE 25 MG/1
TABLET ORAL
COMMUNITY
Start: 2022-10-11

## 2022-10-19 RX ORDER — ATORVASTATIN CALCIUM 80 MG/1
80 TABLET, FILM COATED ORAL
COMMUNITY
Start: 2022-10-11

## 2022-10-19 NOTE — PROGRESS NOTES
Hematology/Oncology  Progress Note    Name: Ari Acevedo  : 1940  Date: 10/19/22    PCP: Viky Dhaliwal MD    Mr. Wendie Hodges is a [de-identified] y.o. man who is here for reassessment of his anemia associated with Chronic Kidney Disease and Prostate Cancer     Subjective:   Mr. Wendie Hodges is a 82-year-old man who has history of Prostate Cancer and Anemia associated with Chronic Kidney Disease. Per chart review, he had an elevation in his PSA to 7 ng/mL then was referred for a bone scan on 2019. This test showed intense uptake involving the medial aspect of the right clavicle. Also there was increased uptake involving the lower thoracic spine at the level of T9 and T11. There was some concern that he may have a small pathologic fracture. With his history of prostate cancer and progressive PSA elevation he was suggested on Degarelix at a dose of 240 mg loading dose followed by 80 mg monthly. His daughter stated he declined systemic therapy. He had received homeopathic therapy for prostate cancer in [place] instead. He subsequently had stroke in 2020. He was very poor historian after stroke with mostly verbal response in yes or no. He has been working with speech therapy after stroke. He stated he did receive radiation to his brain mass previously. On 2/3/2021 C1 Degarelix and Xgeva. Today the patient is in the office for follow up of anemia associated with CKD and Prostate Cancer. He is in the office with his his daughter. Today he denies urinary symptoms. Today he  Denies fever, chills, night sweat, unintentional weight loss, skin lumps or bumps, acute bleeding or bruising issues. Denies headache, acute vision change, dizziness, chest pain, worsen shortness of breath, palpitation, productive cough, nausea, vomiting, abdominal pain, altered bowel habits, dysuria, bone pain or back pain, new focal numbness or weakness.  Using a cane for support and mobility       Past medical history, family history, and social history: these were reviewed and remains unchanged. Past Medical History:   Diagnosis Date    Elevated PSA     Hypercholesterolemia     Hypertension     Stroke (cerebrum) ([facility] [address]      No past surgical history on file. Social History     Socioeconomic History    Marital status: SINGLE     Spouse name: Not on file    Number of children: Not on file    Years of education: Not on file    Highest education level: Not on file   Occupational History    Not on file   Tobacco Use    Smoking status: Never    Smokeless tobacco: Never   Substance and Sexual Activity    Alcohol use: No    Drug use: Not on file    Sexual activity: Not on file   Other Topics Concern    Not on file   Social History Narrative    Not on file     Social Determinants of Health     Financial Resource Strain: Not on file   Food Insecurity: Not on file   Transportation Needs: Not on file   Physical Activity: Not on file   Stress: Not on file   Social Connections: Not on file   Intimate Partner Violence: Not on file   Housing Stability: Not on file     Family History   Problem Relation Age of Onset    Diabetes Mother     Hypertension Mother     Heart Disease Mother     Cancer Father     Heart Disease Brother     Diabetes Brother     Heart Disease Brother      Current Outpatient Medications   Medication Sig Dispense Refill    Zytiga 500 mg tab TAKE 2 TABLETS (1,000MG) BY MOUTH ONCE DAILY ON AN  EMPTY STOMACH 1 HOUR BEFORE OR 2 HOURS AFTER A MEAL 60 Tablet 1    dorzolamide-timoloL (COSOPT) 22.3-6.8 mg/mL ophthalmic solution instill 1 drop into both eyes twice a day      losartan (Cozaar) 50 mg tablet Take  by mouth daily. amLODIPine (Norvasc) 10 mg tablet Take  by mouth daily. ferrous sulfate (SLOW FE) 142 mg (45 mg iron) ER tablet Take 1 Tablet by mouth every other day. 45 Tablet 1    predniSONE (DELTASONE) 5 mg tablet Take 1 Tablet by mouth daily.  30 Tablet 3    abiraterone 500 mg tab       tamsulosin (FLOMAX) 0.4 mg capsule take 1 capsule by mouth every evening 90 Cap 0    bumetanide (BUMEX) 2 mg tablet Take 2 mg by mouth daily. 0    CARTIA  mg ER capsule   0    metFORMIN (GLUCOPHAGE) 500 mg tablet Take  by mouth two (2) times daily (with meals). (Patient not taking: Reported on 6/23/2022)      timolol (TIMOPTIC) 0.5 % ophthalmic solution 1 Drop two (2) times a day. ELIQUIS 5 mg tablet   0    atorvastatin (LIPITOR) 40 mg tablet take 1 tablet by mouth at bedtime for cholesterol  0    fenofibrate micronized (LOFIBRA) 67 mg capsule take 1 capsule by mouth once daily (Patient not taking: Reported on 6/23/2022)  0    furosemide (LASIX) 20 mg tablet  (Patient not taking: Reported on 6/23/2022)  0    levETIRAcetam (KEPPRA) 750 mg tablet take 1 tablet by mouth twice a day  0    carvedilol (COREG) 12.5 mg tablet Take  by mouth two (2) times daily (with meals). losartan-hydrochlorothiazide (HYZAAR) 100-25 mg per tablet Take 1 Tab by mouth daily. omeprazole (PRILOSEC) 10 mg capsule Take 10 mg by mouth daily. Review of Systems   Constitutional:  Negative for chills, diaphoresis, fever, malaise/fatigue and weight loss. Respiratory:  Negative for cough, hemoptysis, shortness of breath and wheezing. Cardiovascular:  Negative for chest pain, palpitations and leg swelling. Gastrointestinal:  Negative for abdominal pain, diarrhea, heartburn, nausea and vomiting. Genitourinary:  Negative for dysuria, frequency, hematuria and urgency. Musculoskeletal:  Negative for joint pain and myalgias. Skin:  Negative for itching and rash. Neurological:  Negative for dizziness, seizures, weakness and headaches. Psychiatric/Behavioral:  Negative for depression. The patient does not have insomnia. Objective:     Visit Vitals  BP (!) 113/54   Pulse 69   Resp 16   Ht 5' 9\" (1.753 m)   Wt 79.4 kg (175 lb)   SpO2 99%   BMI 25.84 kg/m²       Physical Exam  Constitutional:       General: He is not in acute distress.   HENT: Head: Normocephalic and atraumatic. Eyes:      Pupils: Pupils are equal, round, and reactive to light. Cardiovascular:      Pulses: Normal pulses. Heart sounds: Normal heart sounds. No murmur heard. Pulmonary:      Effort: Pulmonary effort is normal. No respiratory distress. Breath sounds: Normal breath sounds. Abdominal:      General: Bowel sounds are normal. There is no distension. Palpations: Abdomen is soft. There is no mass. Tenderness: There is no abdominal tenderness. There is no guarding. Musculoskeletal:         General: No swelling or tenderness. Cervical back: Neck supple. No rigidity. Lymphadenopathy:      Cervical: No cervical adenopathy. Skin:     General: Skin is warm. Findings: No rash. Neurological:      Mental Status: He is alert and oriented to person, place, and time. Mental status is at baseline. Cranial Nerves: No cranial nerve deficit. Psychiatric:         Mood and Affect: Mood normal.        Diagnostics:      No results found for this or any previous visit (from the past 96 hour(s)). Imaging:  No results found for this or any previous visit. No results found for this or any previous visit. Results for orders placed in visit on 08/26/21    CT CHEST ABD PELV W CONT    Narrative  EXAM: CT CHEST, ABDOMEN AND PELVIS WITH CONTRAST    CLINICAL INDICATION/HISTORY: Prostate cancer with bone metastases. COMPARISON: Outside hospital CT chest/abdomen/pelvis 9/28/2020    TECHNIQUE:  CT chest, abdomen and pelvis with IV contrast.  All CT scans at this facility are performed using dose optimization technique as  appropriate to the performed examination, to include automated exposure control,  adjustment of the mA and/or kV according to patient's size (including  appropriate matching for site-specific examinations), or use of an iterative  reconstruction technique.       FINDINGS:    Lung/Airways:  6 mm nodule along the right major fissure (4, 30), may be an  intrapulmonary lymph node, stable. Another 4 mm nodule more inferiorly along the  right major fissure (33) may also be an intrapulmonary lymph node, also stable. 4 mm right upper lobe nodule (4, 18), stable. 3 mm right middle lobe nodule  (41), stable. 2 mm right lower lobe nodule (41), not definitely seen on prior. No consolidation or pleural effusion. Base of Neck:  Unremarkable. Mediastinum: 1 cm short axis AP window lymph node. Main pulmonary artery  measures 3.7 cm, suggesting pulmonary arterial hypertension. Heart: Small pericardial effusion, increased from prior. . Extensive coronary  artery calcifications. Mild aortic valve calcification. Liver: Diffuse low-attenuation of the liver likely reflects steatosis. Few  subcentimeter hypodense lesions (for example, measuring 5 mm in the inferior  right hepatic lobe on 3, 23) are too small to characterize, but most likely  cysts or hemangiomas, and stable from prior. 1.6 x 0.9 cm focus of arterial  enhancement in segment 7 (11), stable from prior. Biliary: Unremarkable. Pancreas: Unremarkable. Spleen: Unremarkable. Adrenal glands: Unremarkable. Kidneys: Punctate nonobstructing bilateral renal stones. Mild areas of scarring  bilateral kidneys, right greater than left. 2.5 cm left renal cyst. No  hydronephrosis. Reproductive organs: Evaluation is significantly limited due to streak artifact  from bilateral hyperechoic opacities. Bladder: Limited evaluation, but grossly unremarkable. Bowel: Tiny hiatal hernia. No evidence for bowel obstruction or inflammation. Lymph nodes: No pathologically enlarged lymph nodes. Vasculature: Extensive burden of calcified atherosclerotic disease, including at  the mesenteric and renal artery ostia. Other: No free fluid or free air. Body wall: Mild bilateral gynecomastia. Tiny fat-containing umbilical hernia.   Areas of stranding in the subcutaneous fat of the lower abdominal wall may  reflect contusions or sequela of medication injection. Bilateral hydroceles,  with fluid extending into the bilateral inguinal regions, new on the left, but  stable on the right since 9/28/2020. Bones: Bilateral hip arthroplasties. Moderate T9 compression deformity and mild  T11 compression deformity, both unchanged. Multilevel degenerative disc disease. Subtle sclerosis of the right clavicular head is unchanged from 9/28/2020. Please see nuclear medicine bone scan performed on the same day and dictated  separately. Impression  1. New 2 mm right lower lobe pulmonary nodule, nonspecific. Attention on  follow-up. Other small pulmonary nodules are unchanged from 9/28/2020.    2. Arterially enhancing lesion in the posterior right hepatic lobe measuring 1.6  cm, most likely a flash filling hemangioma, stable from 9/28/2020.  -Few subcentimeter hypodense lesions in the liver are too small to characterize,  but likely cysts, also unchanged. 3. Nonobstructing renal stones bilaterally. 4. Please see nuclear medicine bone scan performed the same day and dictated  separately for complete evaluation. Unchanged subtle sclerosis in the right  clavicular head. 5. Small pericardial effusion is increased from prior. 6. Enlarged main pulmonary artery, unchanged from prior, suggests pulmonary  chill hypertension. 7. Other findings as above. Assessment:     1. Prostate cancer metastatic to bone ([address]    2. Bilateral renal stones    3. Anemia, chronic renal failure, stage 3 (moderate) (HCC)    4. 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        Plan:   # Prostate cancer  # Bone metastases:   -- 12/11/2018 showed that his prostate specific antigen was 7.27 ng/mL. -- Patient was being followed by Dr. Caitlyn Simpson who just retired.  A follow-up PSA and and a bone scan was schedule and the result showed intense uptake involving the medial aspect of the right clavicle. Also there was increased uptake involving the lower thoracic spine at the level of T9 and T11. --The MRI of the thoracic spine shows correlating with abnormal uptake on prior whole body bone scan of 4/12/2019, there are acute/subacute fractures are seen within T9 and T11 vertebral bodies, both of which are almost certainly pathologic given foci of suspicious enhancement. Additional osseous metastatic lesion within left paracentral T2 vertebral body. Multilevel degenerative disc disease, to include ventral thoracic cord impingement at T2-T3 and T6-T7 vertebral body levels. + The MRI of the lumber spine also shows no fracture nor osseous metastatic disease within the lumbar spine or visualized osseous pelvis. Only partially visualized on this exam is the T11 vertebral body pathologic fracture, better evaluated on concurrent thoracic spine MRI. Severe right foraminal stenosis at L3-L4 with impingement of right L3 foraminal nerve root by prominent focal right foraminal disc bulge. Moderate bilateral foraminal stenoses at L5-S1, each with abutment of respective L5 foraminal nerve roots by adjacent foraminal disc. Moderate L3-L4 and mild L4-L5 canal stenoses. -- 4/2019 He was suggested on Degarelix at a dose of 240 mg loading dose followed by 80 mg monthly. He declined systemic chemotherapy. Subsequently he had received homeopathic therapy for prostate cancer in [place] instead. -- He had stroke in March 2020. He was poor historian after stroke with mostly verbal response only in yes or no. He has been working with speech therapy after stroke. -- 7/14/2020 PSA was elevated from 3.7 to 7.5  -- 9/28/2020 CT C/A/P reported no findings to suggest interval progression of metastatic disease.  -- 9/15/2020 Bone Scan reported persistent moderate to intense activity involving the medial aspect of both clavicular heads.  Less intense uptake involving the right anterior fourth rib as well as T9 and T11 vertebra. No new foci of uptake is identified to strongly suggest progression of osseous metastatic disease. -- 11/5/2020 PSA was 7.4  -- He has seen Dr. Isiah Martinez who did not see any current role for radiation as his urinary function is excellent and he has metastatic disease that is asymptomatic. He was agreeable with re-initiation of ADT given his excellent response at pervious time. He continues to decline chemotherapy. -- 2/3/2021 C1 Degarelix and Xgeva at [facility]. -- 4/8/2021 PSA 4.8.  -- 6/22/2021 PSA 4.2  -- 8/17/2021 PSA 6.6   -- 9/2/2021 CT New 2 mm right lower lobe pulmonary nodule, nonspecific. Attention on follow-up. Other small pulmonary nodules are unchanged from 9/28/2020.  + Arterially enhancing lesion in the posterior right hepatic lobe measuring 1.6  cm, most likely a flash filling hemangioma, stable from 9/28/2020.  + Few subcentimeter hypodense lesions in the liver are too small to characterize,  but likely cysts, also unchanged. --9/2/2021 Bone scan reported no definite scintigraphic evidence of osseous metastatic disease. Subtle focal uptake seen at the posterior T11 vertebral body may correspond to a compression deformity at this location seen on reference imaging. Suggests chronic fracture. -- Patient started Abiraterone and Prednisone combo on 10/13/21. He has tolerated therapy without high graded toxicities. Clinical stable.   --01/28/2021 PSA 5.930  --09/22/2021 PSA 3.620  --12/15/2021 PSA 0.400  --03/26/2022 PSA 0.050  --05/17/2022 PSA 0.0  --05/05/2022: CT C/A/P reported no metastatic disease identified in the abdomen or pelvis.  + Interval increase in number of small pulmonary nodules bilaterally which measure less than 6 mm in size. Some of these are centrilobular in location and others demonstrate mild surrounding groundglass density suggesting these could be related to infectious or inflammatory bronchiolitis rather than metastatic disease.  Recommend attention on follow-up imaging.   --05/05/2022: Bone Scan showed no evidence of osseous metastatic disease. --7/26/2022 PSA was 0.022 ng/ml   --09/07/2022 PSA 0.020  -- He has tolerated therapy without high graded toxicities. Plan:  --He will continue [facility]geva injection at [facility] as scheduled. --He will continue Degarelix maintenance dose 80mg every 4 weeks; Abiraterone 1,000mg once daily in combination with Prednisone 5mg once daily. --Continue lab check: CBC, CMP, PSA/Testosterone. -- Repeat CT and Bone scan   --We will see the patient back in clinic in about 8 weeks. Always sooner if required. Degarelix  Day 1: Degarelix 240mg every 4-week cycle, followed by:  Day 1: Degarelix 80mg (administered as 1 subcutaneous 4mL injection). Repeat cycle every 4 weeks. Nonobstructing Renal Stones Bilaterally. --Asymptomatic. --The patient will f/u his Urology for further management. Anemia associated with Chronic Renal Failure, Stage III:  Iron Deficiency Anemia   --The patient has been scheduled to receive Procrit therapy whenever his hemoglobin is below 10g/dL. --10/15/2022: CBC showed WBC 4.0, hemoglobin 12.2g/dL and hematocrit 36.7%. Platelet 715. Ferritin 466. Iron Sat 29%. BUN 28 and Creatinine 1.4  --We will continue to monitor labs. Orders Placed This Encounter    CT CHEST ABD PELV W CONT     Standing Status:   Future     Standing Expiration Date:   11/19/2023     Order Specific Question:   STAT Creatinine as indicated     Answer:   Yes     Order Specific Question: This order utilizes IV contrast.  What additional contrast is needed?      Answer:   Per Radiologist Protocol    NM BONE SCAN [facility] BODY     Standing Status:   Future     Standing Expiration Date:   11/19/2023    PROSTATE SPECIFIC AG     Standing Status:   Future     Standing Expiration Date:   10/20/2023    TESTOSTERONE, FREE & TOTAL     Standing Status:   Future     Standing Expiration Date:   10/20/2023    predniSONE (DELTASONE) 5 mg tablet     Sig: Take 1 Tablet by mouth daily.      Dispense:  90 Tablet     Refill:  3          Melody Caruso MD  10/19/2022      CC: Tobi Thompson MD

## 2022-10-22 LAB
TESTOST FREE SERPL-MCNC: 1.2 PG/ML (ref 6.6–18.1)
TESTOST SERPL-MCNC: <3 NG/DL (ref 264–916)

## 2022-11-03 DIAGNOSIS — C79.51 PROSTATE CANCER METASTATIC TO BONE (HCC): ICD-10-CM

## 2022-11-03 DIAGNOSIS — C79.51 BONE METASTASES (HCC): ICD-10-CM

## 2022-11-03 DIAGNOSIS — C61 PROSTATE CANCER (HCC): ICD-10-CM

## 2022-11-03 DIAGNOSIS — C61 PROSTATE CANCER METASTATIC TO BONE (HCC): ICD-10-CM

## 2022-11-07 DIAGNOSIS — C79.51 PROSTATE CANCER METASTATIC TO BONE (HCC): ICD-10-CM

## 2022-11-07 DIAGNOSIS — C61 PROSTATE CANCER METASTATIC TO BONE (HCC): ICD-10-CM

## 2022-11-10 RX ORDER — ABIRATERONE ACETATE 500 MG/1
TABLET, FILM COATED ORAL
Qty: 60 TABLET | Refills: 1 | Status: SHIPPED | OUTPATIENT
Start: 2022-11-10

## 2022-12-14 ENCOUNTER — OFFICE VISIT (OUTPATIENT)
Dept: ONCOLOGY | Age: 82
End: 2022-12-14
Payer: MEDICARE

## 2022-12-14 VITALS
BODY MASS INDEX: 27.67 KG/M2 | RESPIRATION RATE: 16 BRPM | WEIGHT: 186.8 LBS | DIASTOLIC BLOOD PRESSURE: 67 MMHG | SYSTOLIC BLOOD PRESSURE: 124 MMHG | HEART RATE: 65 BPM | HEIGHT: 69 IN | OXYGEN SATURATION: 100 %

## 2022-12-14 DIAGNOSIS — C79.51 PROSTATE CANCER METASTATIC TO BONE (HCC): Primary | ICD-10-CM

## 2022-12-14 DIAGNOSIS — C79.51 BONE METASTASES (HCC): ICD-10-CM

## 2022-12-14 DIAGNOSIS — C61 PROSTATE CANCER METASTATIC TO BONE (HCC): Primary | ICD-10-CM

## 2022-12-14 DIAGNOSIS — N20.0 BILATERAL RENAL STONES: ICD-10-CM

## 2022-12-14 DIAGNOSIS — Z79.899 ON ANTINEOPLASTIC CHEMOTHERAPY: ICD-10-CM

## 2022-12-14 DIAGNOSIS — D63.1 ANEMIA, CHRONIC RENAL FAILURE, STAGE 3 (MODERATE) (HCC): ICD-10-CM

## 2022-12-14 DIAGNOSIS — N18.30 ANEMIA, CHRONIC RENAL FAILURE, STAGE 3 (MODERATE) (HCC): ICD-10-CM

## 2022-12-14 PROCEDURE — G0463 HOSPITAL OUTPT CLINIC VISIT: HCPCS | Performed by: INTERNAL MEDICINE

## 2022-12-14 NOTE — PROGRESS NOTES
Hematology/Oncology  Progress Note    Name: Teresa Friend  : 1940  Date: 22    PCP: Kimberly Petersen MD    Mr. Fatuma Kauffman is a [de-identified] y.o. man who is here for reassessment of his anemia associated with Chronic Kidney Disease and Prostate Cancer     Subjective:   Mr. Fatuma Kauffman is a 44-year-old man who has history of Prostate Cancer and Anemia associated with Chronic Kidney Disease. Per chart review, he had an elevation in his PSA to 7 ng/mL then was referred for a bone scan on 2019. This test showed intense uptake involving the medial aspect of the right clavicle. Also there was increased uptake involving the lower thoracic spine at the level of T9 and T11. There was some concern that he may have a small pathologic fracture. With his history of prostate cancer and progressive PSA elevation he was suggested on Degarelix at a dose of 240 mg loading dose followed by 80 mg monthly. His daughter stated he declined systemic therapy. He had received homeopathic therapy for prostate cancer in [place] instead. He subsequently had stroke in 2020. He was very poor historian after stroke with mostly verbal response in yes or no. He has been working with speech therapy after stroke. He stated he did receive radiation to his brain mass previously. On 2/3/2021 C1 Degarelix and Xgeva. Today the patient is in the office for follow up of Prostate Cancer and anemia associated with CKD. He is in the office with his his daughter. Today he reported doing well overall. He denies urinary symptoms. He denies fevers, chills, night sweats, unintentional weight loss, skin lumps or bumps, acute bleeding or bruising issues. Denies headaches, acute vision changes, dizziness, chest pains, shortness of breath, palpitations, productive cough, nausea, vomiting, abdominal pain, altered bowel habits, dysuria, bone pain or back pain, new focal numbness or weakness. Using a cane for support and mobility.  He does not have any concerns or complaints to report at this time. Past medical history, family history, and social history: these were reviewed and remain unchanged. Past Medical History:   Diagnosis Date    Elevated PSA     Hypercholesterolemia     Hypertension     Stroke (cerebrum) ([facility] [address]      History reviewed. No pertinent surgical history. Social History     Socioeconomic History    Marital status: SINGLE     Spouse name: Not on file    Number of children: Not on file    Years of education: Not on file    Highest education level: Not on file   Occupational History    Not on file   Tobacco Use    Smoking status: Never    Smokeless tobacco: Never   Substance and Sexual Activity    Alcohol use: No    Drug use: Not on file    Sexual activity: Not on file   Other Topics Concern    Not on file   Social History Narrative    Not on file     Social Determinants of Health     Financial Resource Strain: Not on file   Food Insecurity: Not on file   Transportation Needs: Not on file   Physical Activity: Not on file   Stress: Not on file   Social Connections: Not on file   Intimate Partner Violence: Not on file   Housing Stability: Not on file     Family History   Problem Relation Age of Onset    Diabetes Mother     Hypertension Mother     Heart Disease Mother     Cancer Father     Heart Disease Brother     Diabetes Brother     Heart Disease Brother      Current Outpatient Medications   Medication Sig Dispense Refill    Zytiga 500 mg tab TAKE 2 TABLETS (1,000MG) BY MOUTH ONCE DAILY ON AN  EMPTY STOMACH 1 HOUR BEFORE OR 2 HOURS AFTER A MEAL 60 Tablet 1    spironolactone (ALDACTONE) 25 mg tablet take 1/2 tablet by mouth once daily      atorvastatin (LIPITOR) 80 mg tablet Take 80 mg by mouth nightly. bumetanide (BUMEX) 1 mg tablet Take 1 mg by mouth two (2) times a day. losartan (COZAAR) 25 mg tablet Take 25 mg by mouth daily. predniSONE (DELTASONE) 5 mg tablet Take 1 Tablet by mouth daily.  90 Tablet 3 dorzolamide-timoloL (COSOPT) 22.3-6.8 mg/mL ophthalmic solution instill 1 drop into both eyes twice a day      losartan (COZAAR) 50 mg tablet Take  by mouth daily. (Patient not taking: Reported on 10/19/2022)      amLODIPine (NORVASC) 10 mg tablet Take  by mouth daily. ferrous sulfate (SLOW FE) 142 mg (45 mg iron) ER tablet Take 1 Tablet by mouth every other day. 45 Tablet 1    abiraterone 500 mg tab       tamsulosin (FLOMAX) 0.4 mg capsule take 1 capsule by mouth every evening 90 Cap 0    bumetanide (BUMEX) 2 mg tablet Take 2 mg by mouth daily. (Patient not taking: Reported on 10/19/2022)  0    CARTIA  mg ER capsule   0    metFORMIN (GLUCOPHAGE) 500 mg tablet Take  by mouth two (2) times daily (with meals). (Patient not taking: No sig reported)      timolol (TIMOPTIC) 0.5 % ophthalmic solution 1 Drop two (2) times a day. ELIQUIS 5 mg tablet   0    atorvastatin (LIPITOR) 40 mg tablet take 1 tablet by mouth at bedtime for cholesterol (Patient not taking: Reported on 10/19/2022)  0    fenofibrate micronized (LOFIBRA) 67 mg capsule take 1 capsule by mouth once daily  0    furosemide (LASIX) 20 mg tablet  (Patient not taking: No sig reported)  0    levETIRAcetam (KEPPRA) 750 mg tablet take 1 tablet by mouth twice a day  0    carvedilol (COREG) 12.5 mg tablet Take  by mouth two (2) times daily (with meals). losartan-hydrochlorothiazide (HYZAAR) 100-25 mg per tablet Take 1 Tab by mouth daily. omeprazole (PRILOSEC) 10 mg capsule Take 10 mg by mouth daily. Review of Systems   Constitutional:  Negative for chills, diaphoresis, fever, malaise/fatigue and weight loss. Respiratory:  Negative for cough, hemoptysis, shortness of breath and wheezing. Cardiovascular:  Negative for chest pain, palpitations and leg swelling. Gastrointestinal:  Negative for abdominal pain, diarrhea, heartburn, nausea and vomiting. Genitourinary:  Negative for dysuria, frequency, hematuria and urgency. Musculoskeletal:  Negative for joint pain and myalgias. Skin:  Negative for itching and rash. Neurological:  Negative for dizziness, seizures, weakness and headaches. Psychiatric/Behavioral:  Negative for depression. The patient does not have insomnia. Objective:     Visit Vitals  /67   Pulse 65   Resp 16   Ht 5' 9\" (1.753 m)   Wt 84.7 kg (186 lb 12.8 oz)   SpO2 100%   BMI 27.59 kg/m²     PS :1    Physical Exam  Constitutional:       General: He is not in acute distress. HENT:      Head: Normocephalic and atraumatic. Eyes:      Pupils: Pupils are equal, round, and reactive to light. Cardiovascular:      Pulses: Normal pulses. Heart sounds: Normal heart sounds. No murmur heard. Pulmonary:      Effort: Pulmonary effort is normal. No respiratory distress. Breath sounds: Normal breath sounds. Abdominal:      General: Bowel sounds are normal. There is no distension. Palpations: Abdomen is soft. There is no mass. Tenderness: There is no abdominal tenderness. There is no guarding. Musculoskeletal:         General: No swelling or tenderness. Cervical back: Neck supple. No rigidity. Lymphadenopathy:      Cervical: No cervical adenopathy. Skin:     General: Skin is warm. Findings: No rash. Neurological:      Mental Status: He is alert and oriented to person, place, and time. Mental status is at baseline. Cranial Nerves: No cranial nerve deficit. Psychiatric:         Mood and Affect: Mood normal.        Diagnostics:      No results found for this or any previous visit (from the past 96 hour(s)). Imaging:  No results found for this or any previous visit. No results found for this or any previous visit. Results for orders placed in visit on 08/26/21    CT CHEST ABD PELV W CONT    Narrative  EXAM: CT CHEST, ABDOMEN AND PELVIS WITH CONTRAST    CLINICAL INDICATION/HISTORY: Prostate cancer with bone metastases.     COMPARISON: Outside hospital CT chest/abdomen/pelvis 9/28/2020    TECHNIQUE:  CT chest, abdomen and pelvis with IV contrast.  All CT scans at this facility are performed using dose optimization technique as  appropriate to the performed examination, to include automated exposure control,  adjustment of the mA and/or kV according to patient's size (including  appropriate matching for site-specific examinations), or use of an iterative  reconstruction technique. FINDINGS:    Lung/Airways:  6 mm nodule along the right major fissure (4, 30), may be an  intrapulmonary lymph node, stable. Another 4 mm nodule more inferiorly along the  right major fissure (33) may also be an intrapulmonary lymph node, also stable. 4 mm right upper lobe nodule (4, 18), stable. 3 mm right middle lobe nodule  (41), stable. 2 mm right lower lobe nodule (41), not definitely seen on prior. No consolidation or pleural effusion. Base of Neck:  Unremarkable. Mediastinum: 1 cm short axis AP window lymph node. Main pulmonary artery  measures 3.7 cm, suggesting pulmonary arterial hypertension. Heart: Small pericardial effusion, increased from prior. . Extensive coronary  artery calcifications. Mild aortic valve calcification. Liver: Diffuse low-attenuation of the liver likely reflects steatosis. Few  subcentimeter hypodense lesions (for example, measuring 5 mm in the inferior  right hepatic lobe on 3, 23) are too small to characterize, but most likely  cysts or hemangiomas, and stable from prior. 1.6 x 0.9 cm focus of arterial  enhancement in segment 7 (11), stable from prior. Biliary: Unremarkable. Pancreas: Unremarkable. Spleen: Unremarkable. Adrenal glands: Unremarkable. Kidneys: Punctate nonobstructing bilateral renal stones. Mild areas of scarring  bilateral kidneys, right greater than left. 2.5 cm left renal cyst. No  hydronephrosis.     Reproductive organs: Evaluation is significantly limited due to streak artifact  from bilateral hyperechoic opacities. Bladder: Limited evaluation, but grossly unremarkable. Bowel: Tiny hiatal hernia. No evidence for bowel obstruction or inflammation. Lymph nodes: No pathologically enlarged lymph nodes. Vasculature: Extensive burden of calcified atherosclerotic disease, including at  the mesenteric and renal artery ostia. Other: No free fluid or free air. Body wall: Mild bilateral gynecomastia. Tiny fat-containing umbilical hernia. Areas of stranding in the subcutaneous fat of the lower abdominal wall may  reflect contusions or sequela of medication injection. Bilateral hydroceles,  with fluid extending into the bilateral inguinal regions, new on the left, but  stable on the right since 9/28/2020. Bones: Bilateral hip arthroplasties. Moderate T9 compression deformity and mild  T11 compression deformity, both unchanged. Multilevel degenerative disc disease. Subtle sclerosis of the right clavicular head is unchanged from 9/28/2020. Please see nuclear medicine bone scan performed on the same day and dictated  separately. Impression  1. New 2 mm right lower lobe pulmonary nodule, nonspecific. Attention on  follow-up. Other small pulmonary nodules are unchanged from 9/28/2020.    2. Arterially enhancing lesion in the posterior right hepatic lobe measuring 1.6  cm, most likely a flash filling hemangioma, stable from 9/28/2020.  -Few subcentimeter hypodense lesions in the liver are too small to characterize,  but likely cysts, also unchanged. 3. Nonobstructing renal stones bilaterally. 4. Please see nuclear medicine bone scan performed the same day and dictated  separately for complete evaluation. Unchanged subtle sclerosis in the right  clavicular head. 5. Small pericardial effusion is increased from prior. 6. Enlarged main pulmonary artery, unchanged from prior, suggests pulmonary  chill hypertension. 7. Other findings as above. Diagnosis:     1.  Prostate cancer metastatic to bone ([address]    2. Bone metastases ([address]    3. Anemia, chronic renal failure, stage 3 (moderate) (HCC)    4. Bilateral renal stones    5. On antineoplastic chemotherapy        Assessment and Plan:   # Prostate cancer  # Bone metastases:   -- 12/11/2018 showed that his prostate specific antigen was 7.27 ng/mL. -- Patient was being followed by Dr. Champ Gerardo who just retired. A follow-up PSA and and a bone scan was schedule and the result showed intense uptake involving the medial aspect of the right clavicle. Also there was increased uptake involving the lower thoracic spine at the level of T9 and T11. --The MRI of the thoracic spine shows correlating with abnormal uptake on prior whole body bone scan of 4/12/2019, there are acute/subacute fractures are seen within T9 and T11 vertebral bodies, both of which are almost certainly pathologic given foci of suspicious enhancement. Additional osseous metastatic lesion within left paracentral T2 vertebral body. Multilevel degenerative disc disease, to include ventral thoracic cord impingement at T2-T3 and T6-T7 vertebral body levels. + The MRI of the lumber spine also shows no fracture nor osseous metastatic disease within the lumbar spine or visualized osseous pelvis. Only partially visualized on this exam is the T11 vertebral body pathologic fracture, better evaluated on concurrent thoracic spine MRI. Severe right foraminal stenosis at L3-L4 with impingement of right L3 foraminal nerve root by prominent focal right foraminal disc bulge. Moderate bilateral foraminal stenoses at L5-S1, each with abutment of respective L5 foraminal nerve roots by adjacent foraminal disc. Moderate L3-L4 and mild L4-L5 canal stenoses. -- 4/2019 He was suggested on Degarelix at a dose of 240 mg loading dose followed by 80 mg monthly. He declined systemic chemotherapy. Subsequently he had received homeopathic therapy for prostate cancer in [place] instead.     -- He had stroke in March 2020. He was poor historian after stroke with mostly verbal response only in yes or no. He has been working with speech therapy after stroke. -- 7/14/2020 PSA was elevated from 3.7 to 7.5  -- 9/28/2020 CT C/A/P reported no findings to suggest interval progression of metastatic disease.  -- 9/15/2020 Bone Scan reported persistent moderate to intense activity involving the medial aspect of both clavicular heads. Less intense uptake involving the right anterior fourth rib as well as T9 and T11 vertebra. No new foci of uptake is identified to strongly suggest progression of osseous metastatic disease. -- 11/5/2020 PSA was 7.4  -- He has seen Dr. Rowdy Ambrosio who did not see any current role for radiation as his urinary function is excellent and he has metastatic disease that is asymptomatic. He was agreeable with re-initiation of ADT given his excellent response at pervious time. He continues to decline chemotherapy. -- 2/3/2021 C1 Degarelix and Xgeva at [facility]". -- 4/8/2021 PSA 4.8.  -- 6/22/2021 PSA 4.2  -- 8/17/2021 PSA 6.6   -- 9/2/2021 CT New 2 mm right lower lobe pulmonary nodule, nonspecific. Attention on follow-up. Other small pulmonary nodules are unchanged from 9/28/2020.  + Arterially enhancing lesion in the posterior right hepatic lobe measuring 1.6  cm, most likely a flash filling hemangioma, stable from 9/28/2020.  + Few subcentimeter hypodense lesions in the liver are too small to characterize,  but likely cysts, also unchanged. --9/2/2021 Bone scan reported no definite scintigraphic evidence of osseous metastatic disease. Subtle focal uptake seen at the posterior T11 vertebral body may correspond to a compression deformity at this location seen on reference imaging. Suggests chronic fracture. -- Patient started Abiraterone and Prednisone combo on 10/13/21. He has tolerated therapy without high graded toxicities.  Clinical stable.   --01/28/2021 PSA 5.930  --09/22/2021 PSA 3.620  --12/15/2021 PSA 0.400  --03/26/2022 PSA 0.050  --05/17/2022 PSA 0.0  --05/05/2022: CT C/A/P reported no metastatic disease identified in the abdomen or pelvis.  + Interval increase in number of small pulmonary nodules bilaterally which measure less than 6 mm in size. Some of these are centrilobular in location and others demonstrate mild surrounding groundglass density suggesting these could be related to infectious or inflammatory bronchiolitis rather than metastatic disease. Recommend attention on follow-up imaging.   --05/05/2022: Bone Scan showed no evidence of osseous metastatic disease. --7/26/2022 PSA was 0.022 ng/ml   --09/07/2022 PSA 0.020  --11/02/2022 PSA 0.030  --11/21/2022: CT CAP showed no evidence of osseous metastatic disease.   + Interval resolution of most of the subcentimeter bilateral pulmonary nodules in keeping with previous infectious/inflammatory disease. Stable 4 mm right upper lobe pulmonary nodule for more than 2 years compatible with a benign etiology. Stable benign intramammary lymph nodes within the right major fissure.   + Stable focal hyperenhancement posterior right hepatic lobe for malignant disease, benign.   --11/21/2022: Bone Scan showed No evidence of osteoblastic metastatic disease. -- He has tolerated therapy without high graded toxicities. Plan:  -- He will continue Xgeva injection at [facility] as scheduled. -- He will continue Degarelix maintenance dose 80mg every 4 weeks; Abiraterone 1,000mg once daily in combination with Prednisone 5mg once daily. -- Continue lab check: CBC, CMP, PSA/Testosterone. -- The patient ws agreeable with the plan. -- We will see the patient back in clinic in about 3 months. Always sooner if required. Degarelix  Degarelix 80mg (administered as 1 subcutaneous 4mL injection). Repeat cycle every 4 weeks.     Anemia associated with Chronic Renal Failure, Stage III:  Iron Deficiency Anemia   --The patient has been scheduled to receive Procrit therapy whenever his hemoglobin is below 10g/dL. --11/02/2022: CBC showed WBC 3.8, hemoglobin 12.0g/dL and hematocrit 36.4%. Platelet 587. Ferritin 466. Iron Sat 29%. BUN 28 and Creatinine 1.4  --We will continue to monitor labs. Nonobstructing Renal Stones Bilaterally. --Asymptomatic. --The patient will f/u his Urology for further management.     Orders Placed This Encounter    CBC WITH AUTOMATED DIFF     Standing Status:   Future     Standing Expiration Date:   77/39/1607    METABOLIC PANEL, COMPREHENSIVE     Standing Status:   Future     Standing Expiration Date:   12/15/2023    FERRITIN     Standing Status:   Future     Standing Expiration Date:   12/14/2023    IRON PROFILE     Standing Status:   Future     Standing Expiration Date:   12/15/2023    PROSTATE SPECIFIC AG     Standing Status:   Future     Standing Expiration Date:   12/14/2023          Caryle Keen, MD  12/14/2022      CC: Prudence Dumont MD

## 2023-01-04 ENCOUNTER — TELEPHONE (OUTPATIENT)
Dept: ONCOLOGY | Age: 83
End: 2023-01-04

## 2023-01-04 DIAGNOSIS — C79.51 BONE METASTASES (HCC): ICD-10-CM

## 2023-01-04 DIAGNOSIS — C79.51 PROSTATE CANCER METASTATIC TO BONE (HCC): Primary | ICD-10-CM

## 2023-01-04 DIAGNOSIS — C61 PROSTATE CANCER METASTATIC TO BONE (HCC): Primary | ICD-10-CM

## 2023-01-04 NOTE — TELEPHONE ENCOUNTER
Bryon Lindquist left this voice messaged needing to speak w/ someone from Dr. Laura Gallo team concerning his [address].  Was told yesterday would reach out to office concerning the payment of it from the insurance Call [phone]

## 2023-01-05 DIAGNOSIS — C79.51 BONE METASTASES (HCC): ICD-10-CM

## 2023-01-05 DIAGNOSIS — N18.30 ANEMIA, CHRONIC RENAL FAILURE, STAGE 3 (MODERATE) (HCC): ICD-10-CM

## 2023-01-05 DIAGNOSIS — D63.1 ANEMIA, CHRONIC RENAL FAILURE, STAGE 3 (MODERATE) (HCC): ICD-10-CM

## 2023-01-05 DIAGNOSIS — C79.51 PROSTATE CANCER METASTATIC TO BONE (HCC): Primary | ICD-10-CM

## 2023-01-05 DIAGNOSIS — C61 PROSTATE CANCER METASTATIC TO BONE (HCC): Primary | ICD-10-CM

## 2023-01-05 RX ORDER — ABIRATERONE 500 MG/1
1000 TABLET ORAL DAILY
Qty: 90 TABLET | Refills: 1 | Status: CANCELLED | OUTPATIENT
Start: 2023-01-05

## 2023-01-05 NOTE — TELEPHONE ENCOUNTER
Ms. Norma Elena called inquiring if office has heard from mail order pharmacy concerning medication Zytiga.

## 2023-01-12 NOTE — TELEPHONE ENCOUNTER
Shaun carrera 01/11/2023 spoke w/ pharmacy yesterday concerning prescription Zytiga. Ms. ZamoraWillie Nicolasa was told insurance confirmation hasn't been received. Please contact to discuss.

## 2023-01-13 RX ORDER — ABIRATERONE 500 MG/1
1000 TABLET ORAL DAILY
Qty: 90 TABLET | Refills: 3 | Status: ACTIVE | OUTPATIENT
Start: 2023-01-13

## 2023-03-06 DIAGNOSIS — D50.8 OTHER IRON DEFICIENCY ANEMIAS: ICD-10-CM

## 2023-03-06 DIAGNOSIS — D63.1 ANEMIA IN CHRONIC KIDNEY DISEASE (CODE): ICD-10-CM

## 2023-03-06 DIAGNOSIS — C79.51 SECONDARY MALIGNANT NEOPLASM OF BONE (HCC): ICD-10-CM

## 2023-03-06 DIAGNOSIS — N18.30 STAGE 3 CHRONIC KIDNEY DISEASE, UNSPECIFIED WHETHER STAGE 3A OR 3B CKD (HCC): ICD-10-CM

## 2023-03-06 DIAGNOSIS — C61 MALIGNANT NEOPLASM OF PROSTATE (HCC): Primary | ICD-10-CM

## 2023-03-07 ENCOUNTER — CLINICAL DOCUMENTATION (OUTPATIENT)
Age: 83
End: 2023-03-07

## 2023-03-07 NOTE — PROGRESS NOTES
Pt. Called and requested labs be faxed to [facility] outpatient Lab [phone].  Fax receipt verified

## 2023-03-10 LAB
% FREE PSA: NORMAL
A/G RATIO: 1.5 RATIO (ref 1.1–2.6)
ALBUMIN SERPL-MCNC: 4.1 G/DL (ref 3.5–5)
ALP BLD-CCNC: 67 U/L (ref 40–125)
ALT SERPL-CCNC: 20 U/L (ref 5–40)
ANION GAP SERPL CALCULATED.3IONS-SCNC: 11 MMOL/L (ref 3–15)
AST SERPL-CCNC: 27 U/L (ref 10–37)
BASOPHILS # BLD: 0 % (ref 0–2)
BASOPHILS ABSOLUTE: 0 K/UL (ref 0–0.2)
BILIRUB SERPL-MCNC: 0.5 MG/DL (ref 0.2–1.2)
BUN BLDV-MCNC: 23 MG/DL (ref 6–22)
CALCIUM SERPL-MCNC: 9.8 MG/DL (ref 8.4–10.5)
CHLORIDE BLD-SCNC: 103 MMOL/L (ref 98–110)
CO2: 29 MMOL/L (ref 20–32)
CREAT SERPL-MCNC: 1.5 MG/DL (ref 0.8–1.6)
EOSINOPHIL # BLD: 2 % (ref 0–6)
EOSINOPHILS ABSOLUTE: 0.1 K/UL (ref 0–0.5)
FERRITIN: 416 NG/ML (ref 22–322)
GLOBULIN: 2.8 G/DL (ref 2–4)
GLOMERULAR FILTRATION RATE: 45.4 ML/MIN/1.73 SQ.M.
GLUCOSE: 117 MG/DL (ref 70–99)
HCT VFR BLD CALC: 35 % (ref 37.8–52.2)
HEMOGLOBIN: 11.5 G/DL (ref 12.6–17.1)
IRON % SATURATION: 22 % (ref 20–50)
IRON: 56 MCG/DL (ref 45–160)
LYMPHOCYTES # BLD: 27 % (ref 20–45)
LYMPHOCYTES ABSOLUTE: 1.3 K/UL (ref 1–4.8)
MCH RBC QN AUTO: 31 PG (ref 26–34)
MCHC RBC AUTO-ENTMCNC: 33 G/DL (ref 31–36)
MCV RBC AUTO: 93 FL (ref 80–95)
MONOCYTES ABSOLUTE: 0.6 K/UL (ref 0.1–1)
MONOCYTES: 13 % (ref 3–12)
NEUTROPHILS ABSOLUTE: 2.8 K/UL (ref 1.8–7.7)
NEUTROPHILS: 58 % (ref 40–75)
PDW BLD-RTO: 14 % (ref 10–15.5)
PLATELET # BLD: 150 K/UL (ref 140–440)
PMV BLD AUTO: 12.3 FL (ref 9–13)
POTASSIUM SERPL-SCNC: 4.2 MMOL/L (ref 3.5–5.5)
PROSTATE SPECIFIC ANTIGEN FREE: <0.018 NG/ML
PSA, TOTAL: 0.02 NG/ML
RBC: 3.76 M/UL (ref 3.8–5.8)
SODIUM BLD-SCNC: 143 MMOL/L (ref 133–145)
TESTOSTERONE FREE PERCENT: 0.82 % (ref 1.5–4.2)
TESTOSTERONE, FREE DIRECT: <.02 NG/DL (ref 5–21)
TESTOSTERONE: <3 NG/DL (ref 264–916)
TOTAL IRON BINDING CAPACITY: 253 MCG/DL (ref 228–428)
TOTAL PROTEIN: 6.9 G/DL (ref 6.2–8.1)
UIBC: 197 MCG/DL (ref 110–370)
WBC: 4.8 K/UL (ref 4–11)

## 2023-03-15 ENCOUNTER — OFFICE VISIT (OUTPATIENT)
Age: 83
End: 2023-03-15
Payer: MEDICARE

## 2023-03-15 VITALS
RESPIRATION RATE: 20 BRPM | OXYGEN SATURATION: 97 % | SYSTOLIC BLOOD PRESSURE: 129 MMHG | DIASTOLIC BLOOD PRESSURE: 60 MMHG | WEIGHT: 181 LBS | HEART RATE: 68 BPM | BODY MASS INDEX: 26.81 KG/M2 | HEIGHT: 69 IN

## 2023-03-15 DIAGNOSIS — Z79.899 OTHER LONG TERM (CURRENT) DRUG THERAPY: ICD-10-CM

## 2023-03-15 DIAGNOSIS — D63.1 ANEMIA IN CHRONIC KIDNEY DISEASE (CODE): ICD-10-CM

## 2023-03-15 DIAGNOSIS — C79.51 SECONDARY MALIGNANT NEOPLASM OF BONE (HCC): ICD-10-CM

## 2023-03-15 DIAGNOSIS — Z79.899 ON ANTINEOPLASTIC CHEMOTHERAPY: ICD-10-CM

## 2023-03-15 DIAGNOSIS — C61 MALIGNANT NEOPLASM OF PROSTATE (HCC): Primary | ICD-10-CM

## 2023-03-15 DIAGNOSIS — D50.8 OTHER IRON DEFICIENCY ANEMIAS: ICD-10-CM

## 2023-03-15 PROCEDURE — 3074F SYST BP LT 130 MM HG: CPT | Performed by: INTERNAL MEDICINE

## 2023-03-15 PROCEDURE — 1036F TOBACCO NON-USER: CPT | Performed by: INTERNAL MEDICINE

## 2023-03-15 PROCEDURE — 3078F DIAST BP <80 MM HG: CPT | Performed by: INTERNAL MEDICINE

## 2023-03-15 PROCEDURE — G8484 FLU IMMUNIZE NO ADMIN: HCPCS | Performed by: INTERNAL MEDICINE

## 2023-03-15 PROCEDURE — G8417 CALC BMI ABV UP PARAM F/U: HCPCS | Performed by: INTERNAL MEDICINE

## 2023-03-15 PROCEDURE — 99214 OFFICE O/P EST MOD 30 MIN: CPT | Performed by: INTERNAL MEDICINE

## 2023-03-15 PROCEDURE — 1123F ACP DISCUSS/DSCN MKR DOCD: CPT | Performed by: INTERNAL MEDICINE

## 2023-03-15 PROCEDURE — G8427 DOCREV CUR MEDS BY ELIG CLIN: HCPCS | Performed by: INTERNAL MEDICINE

## 2023-03-15 RX ORDER — AMIODARONE HYDROCHLORIDE 200 MG/1
200 TABLET ORAL DAILY
COMMUNITY

## 2023-03-15 NOTE — PROGRESS NOTES
(administered as 1 subcutaneous 4mL injection). Repeat cycle every 4 weeks. Anemia associated with Chronic Renal Failure, Stage III:  Iron Deficiency Anemia   --The patient has been scheduled to receive Procrit therapy whenever his hemoglobin is below 10g/dL. --11/02/2022: CBC showed WBC 3.8, hemoglobin 12.0g/dL and hematocrit 36.4%. Platelet 785. Ferritin 466. Iron Sat 29%. BUN 28 and Creatinine 1.4.  -- 3/8/2023 H/H 11.5/35, WBC 4.8. PLTs 150,000  --We will continue to monitor labs. Nonobstructing Renal Stones Bilaterally. --Asymptomatic. --The patient will f/u his Urology for further management. Orders Placed This Encounter    CT CHEST ABDOMEN PELVIS WO CONTRAST Additional Contrast? None     Standing Status:   Future     Standing Expiration Date:   3/15/2024     Order Specific Question:   Additional Contrast?     Answer:   None    NM BONE SCAN WHOLE BODY     Standing Status:   Future     Standing Expiration Date:   3/15/2024    amiodarone (CORDARONE) 200 MG tablet     Sig: Take 200 mg by mouth daily        The patient has a reminder for a \"due or due soon\" health maintenance. I have asked the patient to contact primary care provider for follow-up on the health maintenance. All of patient's questions answered to their apparent satisfaction. They verbally show understanding and agreement with aforementioned plan. Svetlana Madera MD  3/15/23            Above mentioned total time spent for this encounter with more than 50% of the time spent in face-to-face counseling, reviewing previous medical charts, discussing on diagnosis and management plan going forward, and co-ordination of care.     CC: Maykel Black MD

## 2023-06-07 ENCOUNTER — HOSPITAL ENCOUNTER (OUTPATIENT)
Facility: HOSPITAL | Age: 83
Discharge: HOME | End: 2023-06-10
Payer: MEDICARE

## 2023-06-07 LAB
25(OH)D3 SERPL-MCNC: 54.5 NG/ML (ref 30–100)
ALBUMIN SERPL-MCNC: 3.5 G/DL (ref 3.4–5)
ALBUMIN SERPL-MCNC: 3.5 G/DL (ref 3.4–5)
ALBUMIN/GLOB SERPL: 1.2 (ref 0.8–1.7)
ALP SERPL-CCNC: 58 U/L (ref 45–117)
ALT SERPL-CCNC: 26 U/L (ref 16–61)
ANION GAP SERPL CALC-SCNC: 4 MMOL/L (ref 3–18)
ANION GAP SERPL CALC-SCNC: 5 MMOL/L (ref 3–18)
AST SERPL-CCNC: 29 U/L (ref 10–38)
BASOPHILS # BLD: 0 K/UL (ref 0–0.1)
BASOPHILS NFR BLD: 0 % (ref 0–2)
BILIRUB SERPL-MCNC: 0.4 MG/DL (ref 0.2–1)
BUN SERPL-MCNC: 36 MG/DL (ref 7–18)
BUN SERPL-MCNC: 37 MG/DL (ref 7–18)
BUN/CREAT SERPL: 22 (ref 12–20)
BUN/CREAT SERPL: 22 (ref 12–20)
CALCIUM SERPL-MCNC: 8.6 MG/DL (ref 8.5–10.1)
CALCIUM SERPL-MCNC: 8.8 MG/DL (ref 8.5–10.1)
CALCIUM SERPL-MCNC: 8.9 MG/DL (ref 8.5–10.1)
CHLORIDE SERPL-SCNC: 106 MMOL/L (ref 100–111)
CHLORIDE SERPL-SCNC: 106 MMOL/L (ref 100–111)
CO2 SERPL-SCNC: 29 MMOL/L (ref 21–32)
CO2 SERPL-SCNC: 29 MMOL/L (ref 21–32)
CREAT SERPL-MCNC: 1.64 MG/DL (ref 0.6–1.3)
CREAT SERPL-MCNC: 1.71 MG/DL (ref 0.6–1.3)
CREAT UR-MCNC: 117 MG/DL (ref 30–125)
DIFFERENTIAL METHOD BLD: ABNORMAL
EOSINOPHIL # BLD: 0.1 K/UL (ref 0–0.4)
EOSINOPHIL NFR BLD: 2 % (ref 0–5)
ERYTHROCYTE [DISTWIDTH] IN BLOOD BY AUTOMATED COUNT: 14.3 % (ref 11.6–14.5)
ERYTHROCYTE [DISTWIDTH] IN BLOOD BY AUTOMATED COUNT: 14.4 % (ref 11.6–14.5)
FERRITIN SERPL-MCNC: 354 NG/ML (ref 8–388)
GLOBULIN SER CALC-MCNC: 3 G/DL (ref 2–4)
GLUCOSE SERPL-MCNC: 124 MG/DL (ref 74–99)
GLUCOSE SERPL-MCNC: 126 MG/DL (ref 74–99)
HCT VFR BLD AUTO: 33.9 % (ref 36–48)
HCT VFR BLD AUTO: 34 % (ref 36–48)
HGB BLD-MCNC: 11.1 G/DL (ref 13–16)
HGB BLD-MCNC: 11.2 G/DL (ref 13–16)
IMM GRANULOCYTES # BLD AUTO: 0 K/UL (ref 0–0.04)
IMM GRANULOCYTES NFR BLD AUTO: 1 % (ref 0–0.5)
IRON SATN MFR SERPL: 23 % (ref 20–50)
IRON SERPL-MCNC: 54 UG/DL (ref 50–175)
LYMPHOCYTES # BLD: 1.1 K/UL (ref 0.9–3.6)
LYMPHOCYTES NFR BLD: 22 % (ref 21–52)
MCH RBC QN AUTO: 30.8 PG (ref 24–34)
MCH RBC QN AUTO: 31 PG (ref 24–34)
MCHC RBC AUTO-ENTMCNC: 32.7 G/DL (ref 31–37)
MCHC RBC AUTO-ENTMCNC: 32.9 G/DL (ref 31–37)
MCV RBC AUTO: 94.2 FL (ref 78–100)
MCV RBC AUTO: 94.2 FL (ref 78–100)
MONOCYTES # BLD: 0.6 K/UL (ref 0.05–1.2)
MONOCYTES NFR BLD: 13 % (ref 3–10)
NEUTS SEG # BLD: 3 K/UL (ref 1.8–8)
NEUTS SEG NFR BLD: 63 % (ref 40–73)
NRBC # BLD: 0 K/UL (ref 0–0.01)
NRBC # BLD: 0 K/UL (ref 0–0.01)
NRBC BLD-RTO: 0 PER 100 WBC
NRBC BLD-RTO: 0 PER 100 WBC
PHOSPHATE SERPL-MCNC: 2.7 MG/DL (ref 2.5–4.9)
PLATELET # BLD AUTO: 139 K/UL (ref 135–420)
PLATELET # BLD AUTO: 146 K/UL (ref 135–420)
PMV BLD AUTO: 12.5 FL (ref 9.2–11.8)
PMV BLD AUTO: 12.6 FL (ref 9.2–11.8)
POTASSIUM SERPL-SCNC: 3.9 MMOL/L (ref 3.5–5.5)
POTASSIUM SERPL-SCNC: 4 MMOL/L (ref 3.5–5.5)
PROT SERPL-MCNC: 6.5 G/DL (ref 6.4–8.2)
PROT UR-MCNC: 33 MG/DL
PROT/CREAT UR-RTO: 0.3
PTH-INTACT SERPL-MCNC: 137.7 PG/ML (ref 18.4–88)
RBC # BLD AUTO: 3.6 M/UL (ref 4.35–5.65)
RBC # BLD AUTO: 3.61 M/UL (ref 4.35–5.65)
SODIUM SERPL-SCNC: 139 MMOL/L (ref 136–145)
SODIUM SERPL-SCNC: 140 MMOL/L (ref 136–145)
TIBC SERPL-MCNC: 237 UG/DL (ref 250–450)
WBC # BLD AUTO: 4.6 K/UL (ref 4.6–13.2)
WBC # BLD AUTO: 4.8 K/UL (ref 4.6–13.2)

## 2023-06-07 PROCEDURE — 82728 ASSAY OF FERRITIN: CPT

## 2023-06-07 PROCEDURE — 84156 ASSAY OF PROTEIN URINE: CPT

## 2023-06-07 PROCEDURE — 83970 ASSAY OF PARATHORMONE: CPT

## 2023-06-07 PROCEDURE — 85025 COMPLETE CBC W/AUTO DIFF WBC: CPT

## 2023-06-07 PROCEDURE — 82306 VITAMIN D 25 HYDROXY: CPT

## 2023-06-07 PROCEDURE — 83550 IRON BINDING TEST: CPT

## 2023-06-07 PROCEDURE — 80053 COMPREHEN METABOLIC PANEL: CPT

## 2023-06-07 PROCEDURE — 84153 ASSAY OF PSA TOTAL: CPT

## 2023-06-07 PROCEDURE — 83540 ASSAY OF IRON: CPT

## 2023-06-07 PROCEDURE — 84403 ASSAY OF TOTAL TESTOSTERONE: CPT

## 2023-06-07 PROCEDURE — 80069 RENAL FUNCTION PANEL: CPT

## 2023-06-07 PROCEDURE — 84402 ASSAY OF FREE TESTOSTERONE: CPT

## 2023-06-07 PROCEDURE — 82570 ASSAY OF URINE CREATININE: CPT

## 2023-06-07 PROCEDURE — 36415 COLL VENOUS BLD VENIPUNCTURE: CPT

## 2023-06-07 PROCEDURE — 85027 COMPLETE CBC AUTOMATED: CPT

## 2023-06-07 PROCEDURE — 84154 ASSAY OF PSA FREE: CPT

## 2023-06-08 ENCOUNTER — TELEPHONE (OUTPATIENT)
Age: 83
End: 2023-06-08

## 2023-06-08 LAB
PSA FREE MFR SERPL: >20 %
PSA FREE SERPL-MCNC: 0.02 NG/ML
PSA SERPL-MCNC: <0.1 NG/ML (ref 0–4)
TESTOST FREE SERPL-MCNC: 1.8 PG/ML (ref 6.6–18.1)
TESTOST SERPL-MCNC: <3 NG/DL (ref 264–916)

## 2023-06-08 NOTE — TELEPHONE ENCOUNTER
Left message on voicemail to have patients daughter call our office, she left a message on voicemail to contact her concerning her fathers appointment

## 2023-06-20 ENCOUNTER — HOSPITAL ENCOUNTER (OUTPATIENT)
Facility: HOSPITAL | Age: 83
Discharge: HOME | End: 2023-06-23
Attending: INTERNAL MEDICINE
Payer: MEDICARE

## 2023-06-20 DIAGNOSIS — D50.8 OTHER IRON DEFICIENCY ANEMIAS: ICD-10-CM

## 2023-06-20 DIAGNOSIS — D63.1 ANEMIA IN CHRONIC KIDNEY DISEASE (CODE): ICD-10-CM

## 2023-06-20 DIAGNOSIS — C61 MALIGNANT NEOPLASM OF PROSTATE (HCC): ICD-10-CM

## 2023-06-20 DIAGNOSIS — C79.51 SECONDARY MALIGNANT NEOPLASM OF BONE (HCC): ICD-10-CM

## 2023-06-20 DIAGNOSIS — Z79.899 OTHER LONG TERM (CURRENT) DRUG THERAPY: ICD-10-CM

## 2023-06-20 PROCEDURE — 74176 CT ABD & PELVIS W/O CONTRAST: CPT

## 2023-06-20 PROCEDURE — A9503 TC99M MEDRONATE: HCPCS | Performed by: INTERNAL MEDICINE

## 2023-06-20 PROCEDURE — 3430000000 HC RX DIAGNOSTIC RADIOPHARMACEUTICAL: Performed by: INTERNAL MEDICINE

## 2023-06-20 PROCEDURE — 78306 BONE IMAGING WHOLE BODY: CPT | Performed by: INTERNAL MEDICINE

## 2023-06-20 RX ORDER — TC 99M MEDRONATE 20 MG/10ML
27.5 INJECTION, POWDER, LYOPHILIZED, FOR SOLUTION INTRAVENOUS
Status: COMPLETED | OUTPATIENT
Start: 2023-06-20 | End: 2023-06-20

## 2023-06-20 RX ADMIN — TC 99M MEDRONATE 27.5 MILLICURIE: 20 INJECTION, POWDER, LYOPHILIZED, FOR SOLUTION INTRAVENOUS at 09:11

## 2023-06-28 ENCOUNTER — TELEPHONE (OUTPATIENT)
Age: 83
End: 2023-06-28

## 2023-07-06 ENCOUNTER — TELEPHONE (OUTPATIENT)
Age: 83
End: 2023-07-06

## 2023-07-06 PROBLEM — R77.8 ELEVATED TROPONIN: Status: ACTIVE | Noted: 2023-07-06

## 2023-07-06 NOTE — TELEPHONE ENCOUNTER
[facility] contacted office to report patients BP is 115/37 and 106/26. Per Sam Galeas NP patient is get his treatment held today. He has been advised to go the ED for further evaluation and contact his pcp.

## 2023-07-11 ENCOUNTER — OFFICE VISIT (OUTPATIENT)
Age: 83
End: 2023-07-11
Payer: MEDICARE

## 2023-07-11 VITALS
RESPIRATION RATE: 18 BRPM | HEART RATE: 72 BPM | OXYGEN SATURATION: 98 % | HEIGHT: 69 IN | BODY MASS INDEX: 27.25 KG/M2 | DIASTOLIC BLOOD PRESSURE: 52 MMHG | WEIGHT: 184 LBS | SYSTOLIC BLOOD PRESSURE: 160 MMHG

## 2023-07-11 DIAGNOSIS — C61 MALIGNANT NEOPLASM OF PROSTATE (HCC): Primary | ICD-10-CM

## 2023-07-11 DIAGNOSIS — Z79.899 ON ANTINEOPLASTIC CHEMOTHERAPY: ICD-10-CM

## 2023-07-11 DIAGNOSIS — D63.1 ANEMIA IN CHRONIC KIDNEY DISEASE (CODE): ICD-10-CM

## 2023-07-11 DIAGNOSIS — C79.51 SECONDARY MALIGNANT NEOPLASM OF BONE (HCC): ICD-10-CM

## 2023-07-11 PROCEDURE — G8427 DOCREV CUR MEDS BY ELIG CLIN: HCPCS | Performed by: INTERNAL MEDICINE

## 2023-07-11 PROCEDURE — G8417 CALC BMI ABV UP PARAM F/U: HCPCS | Performed by: INTERNAL MEDICINE

## 2023-07-11 PROCEDURE — 1111F DSCHRG MED/CURRENT MED MERGE: CPT | Performed by: INTERNAL MEDICINE

## 2023-07-11 PROCEDURE — 1036F TOBACCO NON-USER: CPT | Performed by: INTERNAL MEDICINE

## 2023-07-11 PROCEDURE — 99214 OFFICE O/P EST MOD 30 MIN: CPT | Performed by: INTERNAL MEDICINE

## 2023-07-11 PROCEDURE — 1123F ACP DISCUSS/DSCN MKR DOCD: CPT | Performed by: INTERNAL MEDICINE

## 2023-07-11 PROCEDURE — 3074F SYST BP LT 130 MM HG: CPT | Performed by: INTERNAL MEDICINE

## 2023-07-11 PROCEDURE — 3078F DIAST BP <80 MM HG: CPT | Performed by: INTERNAL MEDICINE

## 2023-07-11 NOTE — PROGRESS NOTES
reported no metastatic disease identified in the abdomen or pelvis.  + Interval increase in number of small pulmonary nodules bilaterally which measure less than 6 mm in size. Some of these are centrilobular in location and others demonstrate mild surrounding groundglass density suggesting these could be related to infectious or inflammatory bronchiolitis rather than metastatic disease. Recommend attention on follow-up imaging.   --05/05/2022: Bone Scan showed no evidence of osseous metastatic disease. --7/26/2022 PSA was 0.022 ng/ml   --09/07/2022 PSA 0.020  --11/02/2022 PSA 0.030  --11/21/2022: CT CAP showed no evidence of osseous metastatic disease.   + Interval resolution of most of the subcentimeter bilateral pulmonary nodules in keeping with previous infectious/inflammatory disease. Stable 4 mm right upper lobe pulmonary nodule for more than 2 years compatible with a benign etiology. Stable benign intramammary lymph nodes within the right major fissure.   + Stable focal hyperenhancement posterior right hepatic lobe for malignant disease, benign.   --11/21/2022: Bone Scan showed no evidence of osteoblastic metastatic disease.  -- 3/8/2023 PSA < 0.018  -- 6/7/2023 PA < 0.1  -- 6/23/2023 Bone scan reported no definite scintigraphic evidence of osseous metastatic disease.  -- 6/20/2023 CT CAP:  IMPRESSION:  Chest:  -Right upper lobe 4 mm nodule, and multiple right lower lobe perifissural 4 mm  nodules, not significant change since prior.  -No convincing evidence of metastatic disease. No acute findings.  -Extensive coronary calcifications. Mild cardiomegaly and prominent pulmonary  artery trunk may reflect pulmonary hypertension.  -See additional details above. Abdomen/pelvis:  -No convincing evidence of metastatic disease. -- Asymptomatic clinically. He has tolerated therapy without high graded toxicities.     Plan:  -- He will continue Degarelix maintenance dose 80mg every 4 weeks; Abiraterone 1,000mg once

## 2023-07-25 RX ORDER — ABIRATERONE 500 MG/1
TABLET ORAL
Qty: 60 TABLET | Refills: 0 | Status: SHIPPED | OUTPATIENT
Start: 2023-07-25

## 2023-08-05 PROBLEM — R77.8 ELEVATED TROPONIN: Status: RESOLVED | Noted: 2023-07-06 | Resolved: 2023-08-05
